# Patient Record
Sex: MALE | Race: WHITE | NOT HISPANIC OR LATINO | Employment: OTHER | URBAN - METROPOLITAN AREA
[De-identification: names, ages, dates, MRNs, and addresses within clinical notes are randomized per-mention and may not be internally consistent; named-entity substitution may affect disease eponyms.]

---

## 2017-02-19 ENCOUNTER — COMMUNICATION - HEALTHEAST (OUTPATIENT)
Dept: FAMILY MEDICINE | Facility: CLINIC | Age: 77
End: 2017-02-19

## 2017-02-19 DIAGNOSIS — I10 HYPERTENSION: ICD-10-CM

## 2017-03-30 ENCOUNTER — COMMUNICATION - HEALTHEAST (OUTPATIENT)
Dept: FAMILY MEDICINE | Facility: CLINIC | Age: 77
End: 2017-03-30

## 2017-03-30 ENCOUNTER — OFFICE VISIT - HEALTHEAST (OUTPATIENT)
Dept: FAMILY MEDICINE | Facility: CLINIC | Age: 77
End: 2017-03-30

## 2017-03-30 DIAGNOSIS — I48.0 PAROXYSMAL ATRIAL FIBRILLATION (H): ICD-10-CM

## 2017-03-30 DIAGNOSIS — I10 ESSENTIAL HYPERTENSION: ICD-10-CM

## 2017-03-30 DIAGNOSIS — E78.00 PURE HYPERCHOLESTEROLEMIA: ICD-10-CM

## 2017-03-30 DIAGNOSIS — E66.3 OVERWEIGHT (BMI 25.0-29.9): ICD-10-CM

## 2017-03-30 LAB
CHOLEST SERPL-MCNC: 237 MG/DL
FASTING STATUS PATIENT QL REPORTED: YES
HDLC SERPL-MCNC: 57 MG/DL
LDLC SERPL CALC-MCNC: 159 MG/DL
TRIGL SERPL-MCNC: 106 MG/DL

## 2018-01-18 ENCOUNTER — OFFICE VISIT - HEALTHEAST (OUTPATIENT)
Dept: FAMILY MEDICINE | Facility: CLINIC | Age: 78
End: 2018-01-18

## 2018-01-18 DIAGNOSIS — E66.3 OVERWEIGHT (BMI 25.0-29.9): ICD-10-CM

## 2018-01-18 DIAGNOSIS — D49.2 ATYPICAL SQUAMOPROLIFERATIVE SKIN LESION: ICD-10-CM

## 2018-01-18 DIAGNOSIS — L71.9 ACNE ROSACEA: ICD-10-CM

## 2018-01-18 DIAGNOSIS — I10 ESSENTIAL HYPERTENSION: ICD-10-CM

## 2018-01-18 DIAGNOSIS — I48.0 PAROXYSMAL ATRIAL FIBRILLATION (H): ICD-10-CM

## 2018-01-18 DIAGNOSIS — E78.00 PURE HYPERCHOLESTEROLEMIA: ICD-10-CM

## 2018-01-29 ENCOUNTER — RECORDS - HEALTHEAST (OUTPATIENT)
Dept: ADMINISTRATIVE | Facility: OTHER | Age: 78
End: 2018-01-29

## 2018-03-19 ENCOUNTER — COMMUNICATION - HEALTHEAST (OUTPATIENT)
Dept: FAMILY MEDICINE | Facility: CLINIC | Age: 78
End: 2018-03-19

## 2018-03-19 DIAGNOSIS — I10 ESSENTIAL HYPERTENSION: ICD-10-CM

## 2018-03-29 ENCOUNTER — OFFICE VISIT - HEALTHEAST (OUTPATIENT)
Dept: FAMILY MEDICINE | Facility: CLINIC | Age: 78
End: 2018-03-29

## 2018-03-29 DIAGNOSIS — L03.031 PARONYCHIA OF GREAT TOE, RIGHT: ICD-10-CM

## 2018-03-29 DIAGNOSIS — D49.2 ATYPICAL SQUAMOPROLIFERATIVE SKIN LESION: ICD-10-CM

## 2018-04-19 ENCOUNTER — OFFICE VISIT - HEALTHEAST (OUTPATIENT)
Dept: FAMILY MEDICINE | Facility: CLINIC | Age: 78
End: 2018-04-19

## 2018-04-19 DIAGNOSIS — I48.0 PAROXYSMAL ATRIAL FIBRILLATION (H): ICD-10-CM

## 2018-04-19 DIAGNOSIS — E66.3 OVERWEIGHT (BMI 25.0-29.9): ICD-10-CM

## 2018-04-19 DIAGNOSIS — I10 ESSENTIAL HYPERTENSION: ICD-10-CM

## 2018-04-19 DIAGNOSIS — E78.00 PURE HYPERCHOLESTEROLEMIA: ICD-10-CM

## 2018-04-19 DIAGNOSIS — Z00.01 ENCOUNTER FOR GENERAL ADULT MEDICAL EXAMINATION WITH ABNORMAL FINDINGS: ICD-10-CM

## 2018-04-19 LAB
ANION GAP SERPL CALCULATED.3IONS-SCNC: 10 MMOL/L (ref 5–18)
BUN SERPL-MCNC: 20 MG/DL (ref 8–28)
CALCIUM SERPL-MCNC: 9.6 MG/DL (ref 8.5–10.5)
CHLORIDE BLD-SCNC: 102 MMOL/L (ref 98–107)
CHOLEST SERPL-MCNC: 223 MG/DL
CO2 SERPL-SCNC: 29 MMOL/L (ref 22–31)
CREAT SERPL-MCNC: 1.12 MG/DL (ref 0.7–1.3)
FASTING STATUS PATIENT QL REPORTED: YES
GFR SERPL CREATININE-BSD FRML MDRD: >60 ML/MIN/1.73M2
GLUCOSE BLD-MCNC: 100 MG/DL (ref 70–125)
HDLC SERPL-MCNC: 56 MG/DL
LDLC SERPL CALC-MCNC: 146 MG/DL
POTASSIUM BLD-SCNC: 4.4 MMOL/L (ref 3.5–5)
SODIUM SERPL-SCNC: 141 MMOL/L (ref 136–145)
TRIGL SERPL-MCNC: 104 MG/DL

## 2018-04-19 ASSESSMENT — MIFFLIN-ST. JEOR: SCORE: 1772.93

## 2018-04-22 ENCOUNTER — COMMUNICATION - HEALTHEAST (OUTPATIENT)
Dept: FAMILY MEDICINE | Facility: CLINIC | Age: 78
End: 2018-04-22

## 2018-05-01 ENCOUNTER — OFFICE VISIT - HEALTHEAST (OUTPATIENT)
Dept: FAMILY MEDICINE | Facility: CLINIC | Age: 78
End: 2018-05-01

## 2018-05-01 DIAGNOSIS — R50.9 FEVER: ICD-10-CM

## 2018-05-01 DIAGNOSIS — R05.9 COUGH: ICD-10-CM

## 2018-05-01 DIAGNOSIS — R53.83 FATIGUE: ICD-10-CM

## 2018-05-01 DIAGNOSIS — J01.90 ACUTE SINUSITIS: ICD-10-CM

## 2018-05-01 LAB
ERYTHROCYTE [DISTWIDTH] IN BLOOD BY AUTOMATED COUNT: 11.5 % (ref 11–14.5)
FLUAV AG SPEC QL IA: NORMAL
FLUBV AG SPEC QL IA: NORMAL
HCT VFR BLD AUTO: 43.7 % (ref 40–54)
HGB BLD-MCNC: 14.9 G/DL (ref 14–18)
MCH RBC QN AUTO: 31.4 PG (ref 27–34)
MCHC RBC AUTO-ENTMCNC: 34.1 G/DL (ref 32–36)
MCV RBC AUTO: 92 FL (ref 80–100)
PLATELET # BLD AUTO: 214 THOU/UL (ref 140–440)
PMV BLD AUTO: 7.7 FL (ref 7–10)
RBC # BLD AUTO: 4.76 MILL/UL (ref 4.4–6.2)
WBC: 6.6 THOU/UL (ref 4–11)

## 2018-05-01 ASSESSMENT — MIFFLIN-ST. JEOR: SCORE: 1772.69

## 2018-10-25 ENCOUNTER — COMMUNICATION - HEALTHEAST (OUTPATIENT)
Dept: FAMILY MEDICINE | Facility: CLINIC | Age: 78
End: 2018-10-25

## 2018-10-25 ENCOUNTER — OFFICE VISIT - HEALTHEAST (OUTPATIENT)
Dept: FAMILY MEDICINE | Facility: CLINIC | Age: 78
End: 2018-10-25

## 2018-10-25 DIAGNOSIS — L71.9 ACNE ROSACEA: ICD-10-CM

## 2018-10-25 DIAGNOSIS — E78.00 PURE HYPERCHOLESTEROLEMIA: ICD-10-CM

## 2018-10-25 DIAGNOSIS — I48.0 PAROXYSMAL ATRIAL FIBRILLATION (H): ICD-10-CM

## 2018-10-25 DIAGNOSIS — I10 ESSENTIAL HYPERTENSION: ICD-10-CM

## 2018-10-25 DIAGNOSIS — E66.3 OVERWEIGHT (BMI 25.0-29.9): ICD-10-CM

## 2018-10-25 LAB
ANION GAP SERPL CALCULATED.3IONS-SCNC: 10 MMOL/L (ref 5–18)
BUN SERPL-MCNC: 19 MG/DL (ref 8–28)
CALCIUM SERPL-MCNC: 9.6 MG/DL (ref 8.5–10.5)
CHLORIDE BLD-SCNC: 105 MMOL/L (ref 98–107)
CO2 SERPL-SCNC: 28 MMOL/L (ref 22–31)
CREAT SERPL-MCNC: 1.18 MG/DL (ref 0.7–1.3)
GFR SERPL CREATININE-BSD FRML MDRD: 60 ML/MIN/1.73M2
GLUCOSE BLD-MCNC: 93 MG/DL (ref 70–125)
POTASSIUM BLD-SCNC: 4 MMOL/L (ref 3.5–5)
SODIUM SERPL-SCNC: 143 MMOL/L (ref 136–145)

## 2018-10-29 ENCOUNTER — COMMUNICATION - HEALTHEAST (OUTPATIENT)
Dept: FAMILY MEDICINE | Facility: CLINIC | Age: 78
End: 2018-10-29

## 2018-10-29 DIAGNOSIS — L71.9 ACNE ROSACEA: ICD-10-CM

## 2018-10-31 ENCOUNTER — OFFICE VISIT - HEALTHEAST (OUTPATIENT)
Dept: FAMILY MEDICINE | Facility: CLINIC | Age: 78
End: 2018-10-31

## 2018-10-31 DIAGNOSIS — R04.0 EPISTAXIS: ICD-10-CM

## 2018-10-31 DIAGNOSIS — L71.9 ACNE ROSACEA: ICD-10-CM

## 2018-10-31 DIAGNOSIS — E78.00 PURE HYPERCHOLESTEROLEMIA: ICD-10-CM

## 2018-10-31 DIAGNOSIS — I10 ESSENTIAL HYPERTENSION: ICD-10-CM

## 2018-12-10 ENCOUNTER — COMMUNICATION - HEALTHEAST (OUTPATIENT)
Dept: FAMILY MEDICINE | Facility: CLINIC | Age: 78
End: 2018-12-10

## 2018-12-10 DIAGNOSIS — I10 ESSENTIAL HYPERTENSION: ICD-10-CM

## 2019-01-07 ENCOUNTER — OFFICE VISIT - HEALTHEAST (OUTPATIENT)
Dept: FAMILY MEDICINE | Facility: CLINIC | Age: 79
End: 2019-01-07

## 2019-01-07 DIAGNOSIS — R04.0 EPISTAXIS: ICD-10-CM

## 2019-01-24 ENCOUNTER — OFFICE VISIT - HEALTHEAST (OUTPATIENT)
Dept: OTOLARYNGOLOGY | Facility: CLINIC | Age: 79
End: 2019-01-24

## 2019-01-24 DIAGNOSIS — R04.0 EPISTAXIS: ICD-10-CM

## 2019-02-06 ENCOUNTER — OFFICE VISIT - HEALTHEAST (OUTPATIENT)
Dept: OTOLARYNGOLOGY | Facility: CLINIC | Age: 79
End: 2019-02-06

## 2019-02-06 DIAGNOSIS — R04.0 EPISTAXIS: ICD-10-CM

## 2019-05-30 ENCOUNTER — OFFICE VISIT - HEALTHEAST (OUTPATIENT)
Dept: FAMILY MEDICINE | Facility: CLINIC | Age: 79
End: 2019-05-30

## 2019-05-30 DIAGNOSIS — T16.2XXA FOREIGN BODY OF LEFT EAR, INITIAL ENCOUNTER: ICD-10-CM

## 2019-10-29 ENCOUNTER — COMMUNICATION - HEALTHEAST (OUTPATIENT)
Dept: FAMILY MEDICINE | Facility: CLINIC | Age: 79
End: 2019-10-29

## 2019-12-10 ENCOUNTER — COMMUNICATION - HEALTHEAST (OUTPATIENT)
Dept: FAMILY MEDICINE | Facility: CLINIC | Age: 79
End: 2019-12-10

## 2019-12-10 ENCOUNTER — AMBULATORY - HEALTHEAST (OUTPATIENT)
Dept: FAMILY MEDICINE | Facility: CLINIC | Age: 79
End: 2019-12-10

## 2019-12-10 ENCOUNTER — OFFICE VISIT - HEALTHEAST (OUTPATIENT)
Dept: FAMILY MEDICINE | Facility: CLINIC | Age: 79
End: 2019-12-10

## 2019-12-10 DIAGNOSIS — N45.1 EPIDIDYMITIS: ICD-10-CM

## 2019-12-10 DIAGNOSIS — I10 ESSENTIAL HYPERTENSION: ICD-10-CM

## 2019-12-10 LAB
ALBUMIN UR-MCNC: NEGATIVE MG/DL
APPEARANCE UR: CLEAR
BILIRUB UR QL STRIP: NEGATIVE
COLOR UR AUTO: YELLOW
GLUCOSE UR STRIP-MCNC: NEGATIVE MG/DL
HGB UR QL STRIP: NEGATIVE
KETONES UR STRIP-MCNC: NEGATIVE MG/DL
LEUKOCYTE ESTERASE UR QL STRIP: NEGATIVE
NITRATE UR QL: NEGATIVE
PH UR STRIP: 5.5 [PH] (ref 5–8)
SP GR UR STRIP: 1.02 (ref 1–1.03)
UROBILINOGEN UR STRIP-ACNC: NORMAL

## 2019-12-10 ASSESSMENT — MIFFLIN-ST. JEOR: SCORE: 1772.24

## 2019-12-24 ENCOUNTER — OFFICE VISIT - HEALTHEAST (OUTPATIENT)
Dept: FAMILY MEDICINE | Facility: CLINIC | Age: 79
End: 2019-12-24

## 2019-12-24 DIAGNOSIS — R30.0 DYSURIA: ICD-10-CM

## 2019-12-24 DIAGNOSIS — N43.3 HYDROCELE IN ADULT: ICD-10-CM

## 2019-12-24 DIAGNOSIS — K40.90 HERNIA OF SCROTUM: ICD-10-CM

## 2019-12-24 DIAGNOSIS — K40.91 UNILATERAL RECURRENT INGUINAL HERNIA WITHOUT OBSTRUCTION OR GANGRENE: ICD-10-CM

## 2019-12-26 ENCOUNTER — OFFICE VISIT - HEALTHEAST (OUTPATIENT)
Dept: FAMILY MEDICINE | Facility: CLINIC | Age: 79
End: 2019-12-26

## 2019-12-26 DIAGNOSIS — I48.0 PAROXYSMAL ATRIAL FIBRILLATION (H): ICD-10-CM

## 2019-12-26 DIAGNOSIS — I10 ESSENTIAL HYPERTENSION: ICD-10-CM

## 2019-12-26 DIAGNOSIS — N43.3 BILATERAL HYDROCELE: ICD-10-CM

## 2019-12-26 DIAGNOSIS — N45.1 EPIDIDYMITIS: ICD-10-CM

## 2019-12-26 DIAGNOSIS — R30.0 DYSURIA: ICD-10-CM

## 2019-12-26 LAB
ANION GAP SERPL CALCULATED.3IONS-SCNC: 11 MMOL/L (ref 5–18)
BUN SERPL-MCNC: 22 MG/DL (ref 8–28)
CALCIUM SERPL-MCNC: 9.8 MG/DL (ref 8.5–10.5)
CHLORIDE BLD-SCNC: 103 MMOL/L (ref 98–107)
CO2 SERPL-SCNC: 27 MMOL/L (ref 22–31)
CREAT SERPL-MCNC: 1.15 MG/DL (ref 0.7–1.3)
ERYTHROCYTE [DISTWIDTH] IN BLOOD BY AUTOMATED COUNT: 11.8 % (ref 11–14.5)
GFR SERPL CREATININE-BSD FRML MDRD: >60 ML/MIN/1.73M2
GLUCOSE BLD-MCNC: 96 MG/DL (ref 70–125)
HCT VFR BLD AUTO: 43.8 % (ref 40–54)
HGB BLD-MCNC: 15.4 G/DL (ref 14–18)
MCH RBC QN AUTO: 32.2 PG (ref 27–34)
MCHC RBC AUTO-ENTMCNC: 35.2 G/DL (ref 32–36)
MCV RBC AUTO: 91 FL (ref 80–100)
PLATELET # BLD AUTO: 258 THOU/UL (ref 140–440)
PMV BLD AUTO: 7.3 FL (ref 7–10)
POTASSIUM BLD-SCNC: 4.2 MMOL/L (ref 3.5–5)
RBC # BLD AUTO: 4.79 MILL/UL (ref 4.4–6.2)
SODIUM SERPL-SCNC: 141 MMOL/L (ref 136–145)
WBC: 5.6 THOU/UL (ref 4–11)

## 2019-12-27 ENCOUNTER — COMMUNICATION - HEALTHEAST (OUTPATIENT)
Dept: FAMILY MEDICINE | Facility: CLINIC | Age: 79
End: 2019-12-27

## 2020-01-13 ENCOUNTER — RECORDS - HEALTHEAST (OUTPATIENT)
Dept: ADMINISTRATIVE | Facility: OTHER | Age: 80
End: 2020-01-13

## 2020-03-02 ENCOUNTER — COMMUNICATION - HEALTHEAST (OUTPATIENT)
Dept: FAMILY MEDICINE | Facility: CLINIC | Age: 80
End: 2020-03-02

## 2020-03-02 DIAGNOSIS — I10 ESSENTIAL HYPERTENSION: ICD-10-CM

## 2020-04-02 ENCOUNTER — RECORDS - HEALTHEAST (OUTPATIENT)
Dept: ADMINISTRATIVE | Facility: OTHER | Age: 80
End: 2020-04-02

## 2020-04-13 ENCOUNTER — RECORDS - HEALTHEAST (OUTPATIENT)
Dept: ADMINISTRATIVE | Facility: OTHER | Age: 80
End: 2020-04-13

## 2020-04-15 ENCOUNTER — OFFICE VISIT - HEALTHEAST (OUTPATIENT)
Dept: FAMILY MEDICINE | Facility: CLINIC | Age: 80
End: 2020-04-15

## 2020-04-15 DIAGNOSIS — J34.89 RHINORRHEA: ICD-10-CM

## 2020-04-15 DIAGNOSIS — R21 RASH: ICD-10-CM

## 2020-04-23 ENCOUNTER — OFFICE VISIT - HEALTHEAST (OUTPATIENT)
Dept: FAMILY MEDICINE | Facility: CLINIC | Age: 80
End: 2020-04-23

## 2020-04-23 DIAGNOSIS — J30.2 SEASONAL ALLERGIC RHINITIS, UNSPECIFIED TRIGGER: ICD-10-CM

## 2020-04-23 DIAGNOSIS — L27.0 ALLERGIC DRUG RASH: ICD-10-CM

## 2020-04-23 RX ORDER — CETIRIZINE HYDROCHLORIDE 10 MG/1
10 TABLET ORAL DAILY
Qty: 30 TABLET | Refills: 2 | Status: SHIPPED | OUTPATIENT
Start: 2020-04-23

## 2020-04-29 ENCOUNTER — OFFICE VISIT - HEALTHEAST (OUTPATIENT)
Dept: FAMILY MEDICINE | Facility: CLINIC | Age: 80
End: 2020-04-29

## 2020-04-29 DIAGNOSIS — L30.9 DERMATITIS: ICD-10-CM

## 2020-05-19 ENCOUNTER — RECORDS - HEALTHEAST (OUTPATIENT)
Dept: ADMINISTRATIVE | Facility: OTHER | Age: 80
End: 2020-05-19

## 2020-10-30 ENCOUNTER — MEDICAL CORRESPONDENCE (OUTPATIENT)
Dept: HEALTH INFORMATION MANAGEMENT | Facility: CLINIC | Age: 80
End: 2020-10-30

## 2020-12-08 ENCOUNTER — COMMUNICATION - HEALTHEAST (OUTPATIENT)
Dept: FAMILY MEDICINE | Facility: CLINIC | Age: 80
End: 2020-12-08

## 2020-12-08 ENCOUNTER — OFFICE VISIT - HEALTHEAST (OUTPATIENT)
Dept: FAMILY MEDICINE | Facility: CLINIC | Age: 80
End: 2020-12-08

## 2020-12-08 DIAGNOSIS — R97.20 ELEVATED PROSTATE SPECIFIC ANTIGEN (PSA): ICD-10-CM

## 2020-12-08 DIAGNOSIS — E78.00 PURE HYPERCHOLESTEROLEMIA: ICD-10-CM

## 2020-12-08 DIAGNOSIS — I48.0 PAROXYSMAL ATRIAL FIBRILLATION (H): ICD-10-CM

## 2020-12-08 DIAGNOSIS — R21 RASH: ICD-10-CM

## 2020-12-08 DIAGNOSIS — I10 ESSENTIAL HYPERTENSION: ICD-10-CM

## 2020-12-08 LAB
ANION GAP SERPL CALCULATED.3IONS-SCNC: 9 MMOL/L (ref 5–18)
BUN SERPL-MCNC: 25 MG/DL (ref 8–28)
CALCIUM SERPL-MCNC: 9.3 MG/DL (ref 8.5–10.5)
CHLORIDE BLD-SCNC: 104 MMOL/L (ref 98–107)
CHOLEST SERPL-MCNC: 228 MG/DL
CO2 SERPL-SCNC: 28 MMOL/L (ref 22–31)
CREAT SERPL-MCNC: 1.17 MG/DL (ref 0.7–1.3)
FASTING STATUS PATIENT QL REPORTED: YES
GFR SERPL CREATININE-BSD FRML MDRD: 60 ML/MIN/1.73M2
GLUCOSE BLD-MCNC: 106 MG/DL (ref 70–125)
HDLC SERPL-MCNC: 53 MG/DL
LDLC SERPL CALC-MCNC: 152 MG/DL
POTASSIUM BLD-SCNC: 4.4 MMOL/L (ref 3.5–5)
SODIUM SERPL-SCNC: 141 MMOL/L (ref 136–145)
TRIGL SERPL-MCNC: 114 MG/DL

## 2020-12-08 RX ORDER — BETAMETHASONE DIPROPIONATE 0.5 MG/G
OINTMENT, AUGMENTED TOPICAL
Qty: 45 G | Refills: 0 | Status: SHIPPED | OUTPATIENT
Start: 2020-12-08 | End: 2023-01-17

## 2020-12-08 RX ORDER — LISINOPRIL AND HYDROCHLOROTHIAZIDE 20; 25 MG/1; MG/1
1 TABLET ORAL DAILY
Qty: 90 TABLET | Refills: 3 | Status: SHIPPED | OUTPATIENT
Start: 2020-12-08 | End: 2021-12-08

## 2020-12-08 RX ORDER — METOPROLOL SUCCINATE 50 MG/1
50 TABLET, EXTENDED RELEASE ORAL DAILY
Qty: 90 TABLET | Refills: 3 | Status: SHIPPED | OUTPATIENT
Start: 2020-12-08 | End: 2021-12-08

## 2021-05-25 ENCOUNTER — AMBULATORY - HEALTHEAST (OUTPATIENT)
Dept: NURSING | Facility: CLINIC | Age: 81
End: 2021-05-25

## 2021-05-29 NOTE — PROGRESS NOTES
Chief Complaint   Patient presents with     other     part of hearing aid stuck in left ear       HPI    Patient is here for having a part of his hearing aid stuck in left ear today. No pain, bleeding.    ROS: Pertinent ROS noted in HPI.     No Known Allergies    Patient Active Problem List   Diagnosis     Hypercholesterolemia     Paroxysmal Atrial Fibrillation     Rosacea     Trigger Finger (Acquired)     Hypertension     Lichen Planus       No family history on file.    Social History     Socioeconomic History     Marital status:      Spouse name: Not on file     Number of children: Not on file     Years of education: Not on file     Highest education level: Not on file   Occupational History     Not on file   Social Needs     Financial resource strain: Not on file     Food insecurity:     Worry: Not on file     Inability: Not on file     Transportation needs:     Medical: Not on file     Non-medical: Not on file   Tobacco Use     Smoking status: Former Smoker     Smokeless tobacco: Never Used   Substance and Sexual Activity     Alcohol use: No     Drug use: No     Sexual activity: Not on file   Lifestyle     Physical activity:     Days per week: Not on file     Minutes per session: Not on file     Stress: Not on file   Relationships     Social connections:     Talks on phone: Not on file     Gets together: Not on file     Attends [employer] service: Not on file     Active member of club or organization: Not on file     Attends meetings of clubs or organizations: Not on file     Relationship status: Not on file     Intimate partner violence:     Fear of current or ex partner: Not on file     Emotionally abused: Not on file     Physically abused: Not on file     Forced sexual activity: Not on file   Other Topics Concern     Not on file   Social History Narrative     Not on file         Objective:      Vitals:    05/30/19 1243 05/30/19 1247   BP: 154/79 144/80   Patient Site: Right Arm Right Arm   Patient  Position: Sitting Sitting   Cuff Size: Adult Large Adult Large   Pulse: 75    Resp: 16    Temp: 98.3  F (36.8  C)    TempSrc: Oral    SpO2: 97%    Weight: 212 lb (96.2 kg)        Gen:NAD  Ears: R TM and canal normal. Exam showed a rubber foreign body stuck in left ear canal that was removed successfully with an alligator instrument. Post-procedure exam showed normal L TM and canal.    Assessment:    Foreign body in left ear - removed without event.    Plan:    F/u prn.

## 2021-05-30 VITALS — WEIGHT: 222 LBS | BODY MASS INDEX: 28.89 KG/M2

## 2021-05-31 VITALS — BODY MASS INDEX: 29.54 KG/M2 | WEIGHT: 227 LBS

## 2021-06-01 VITALS — WEIGHT: 222 LBS | BODY MASS INDEX: 29.42 KG/M2 | HEIGHT: 73 IN

## 2021-06-01 VITALS — BODY MASS INDEX: 27.81 KG/M2 | HEIGHT: 74 IN | WEIGHT: 216.7 LBS

## 2021-06-01 VITALS — BODY MASS INDEX: 29.54 KG/M2 | WEIGHT: 227 LBS

## 2021-06-02 VITALS — BODY MASS INDEX: 28.45 KG/M2 | WEIGHT: 221.6 LBS

## 2021-06-02 VITALS — WEIGHT: 220 LBS | BODY MASS INDEX: 28.25 KG/M2

## 2021-06-02 VITALS — WEIGHT: 223 LBS | BODY MASS INDEX: 28.63 KG/M2

## 2021-06-02 NOTE — TELEPHONE ENCOUNTER
"Who is calling:  SARA Vasquez from [facility], she is an NP and did a peripheal artery disease screening on this patient. The patient has \"moderate results\" he is not on a statin that she knows of. He's not having any symptoms either. \"I just want you to be aware.\" Blood pressure was 150/84 the patient said he takes it at home and is always between 1/30 and 140, and Christina said she had checked it twice and got same reading of 150/84.  Reason for Call:  See above   Date of last appointment with primary care: N/A  Okay to leave a detailed message: Yes       "

## 2021-06-03 VITALS — BODY MASS INDEX: 27.22 KG/M2 | WEIGHT: 212 LBS

## 2021-06-04 VITALS
BODY MASS INDEX: 27.48 KG/M2 | SYSTOLIC BLOOD PRESSURE: 130 MMHG | WEIGHT: 214 LBS | DIASTOLIC BLOOD PRESSURE: 80 MMHG | OXYGEN SATURATION: 98 % | HEART RATE: 78 BPM

## 2021-06-04 VITALS
RESPIRATION RATE: 16 BRPM | BODY MASS INDEX: 27.1 KG/M2 | SYSTOLIC BLOOD PRESSURE: 169 MMHG | DIASTOLIC BLOOD PRESSURE: 82 MMHG | TEMPERATURE: 98.2 F | OXYGEN SATURATION: 97 % | HEART RATE: 70 BPM | WEIGHT: 211.1 LBS

## 2021-06-04 VITALS
DIASTOLIC BLOOD PRESSURE: 80 MMHG | OXYGEN SATURATION: 96 % | RESPIRATION RATE: 16 BRPM | TEMPERATURE: 98.2 F | HEART RATE: 88 BPM | SYSTOLIC BLOOD PRESSURE: 148 MMHG

## 2021-06-04 VITALS
OXYGEN SATURATION: 98 % | BODY MASS INDEX: 27.8 KG/M2 | WEIGHT: 216.6 LBS | HEART RATE: 81 BPM | SYSTOLIC BLOOD PRESSURE: 140 MMHG | HEIGHT: 74 IN | DIASTOLIC BLOOD PRESSURE: 80 MMHG

## 2021-06-04 VITALS
DIASTOLIC BLOOD PRESSURE: 75 MMHG | HEART RATE: 80 BPM | BODY MASS INDEX: 27.45 KG/M2 | SYSTOLIC BLOOD PRESSURE: 157 MMHG | RESPIRATION RATE: 12 BRPM | OXYGEN SATURATION: 96 % | WEIGHT: 213.8 LBS | TEMPERATURE: 98.3 F

## 2021-06-04 NOTE — PROGRESS NOTES
"Assessment/Plan:    1. Epididymitis  Pain most suspicious of epididymitis based on history and exam today.  Will treat empirically with levofloxacin 500 mg once daily for 10 days.  We discussed obtaining a testicular ultrasound as well however patient prefers to start with antibiotics initially.  Will consider ultrasound with any persisting or worsening symptoms.  Will check urinalysis today to rule out UTI as well.  - Urinalysis-UC if Indicated  - levoFLOXacin (LEVAQUIN) 500 MG tablet; Take 1 tablet (500 mg total) by mouth daily for 10 days.  Dispense: 10 tablet; Refill: 0      Subjective:    Reddy Allen is a 79-year-old male seen today for edition of right testicular pain.  Patient developed symptoms about 3 weeks ago.  Initially, describes a sensation of twinge after voiding.  Did not have pain with voiding but right after.  Pain is localized to the right testicle.  Over the last couple of days pain has gotten worse.  He now has discomfort with sitting.  Less pain with walking or when in an upright position.  He denies any injury to the testicle that he is aware of.  Low chance for STD.  Has not felt any lumps or bumps.  He describes discomfort as a dull \"toothache\" in his testicle.  He has had some minor loose stools off and on but this is not new for him.  Otherwise normal bowel movements.  Denies any fevers, chills, myalgias or other systemic symptoms.  No urinary symptoms such as hematuria, dysuria, frequency or urgency. No history of symptoms similar to this.  Does recall being told that he has hernia several years ago.  Cannot recall the location however.  Review of systems is as stated in HPI, and the remainder of the 10 system review is otherwise unremarkable.    Past Medical History, Family History, and Social History reviewed.    History reviewed. No pertinent surgical history.     No family history on file.     History reviewed. No pertinent past medical history.     Social History     Tobacco Use " "    Smoking status: Former Smoker     Smokeless tobacco: Never Used   Substance Use Topics     Alcohol use: No     Drug use: No        Current Outpatient Medications   Medication Sig Dispense Refill     lisinopril-hydrochlorothiazide (PRINZIDE,ZESTORETIC) 20-25 mg per tablet Take 1 tablet by mouth daily. 90 tablet 3     metoprolol succinate (TOPROL-XL) 50 MG 24 hr tablet Take 1 tablet (50 mg total) by mouth daily. 90 tablet 3     MULTIVITS-MIN/HRB  (URINOZINC PROSTATE FORMULA ORAL) Take by mouth.       aspirin 81 MG EC tablet Take 1 tablet (81 mg total) by mouth daily. 150 tablet 2     levoFLOXacin (LEVAQUIN) 500 MG tablet Take 1 tablet (500 mg total) by mouth daily for 10 days. 10 tablet 0     No current facility-administered medications for this visit.           Objective:    Vitals:    12/10/19 1019   BP: 144/80   Patient Site: Right Arm   Patient Position: Sitting   Cuff Size: Adult Regular   Pulse: 81   SpO2: 98%   Weight: 216 lb 9.6 oz (98.2 kg)   Height: 6' 2\" (1.88 m)      Body mass index is 27.81 kg/m .      General Appearance:    Alert, cooperative, no distress, appears stated age.     Heart:    Regular rate and rhythm, S1 and S2 normal, no murmur, rub   or gallop   Abdomen:    Back:     Soft, non-tender, bowel sounds active all four quadrants,     no masses, no organomegaly.    No CVA tenderness   Genitalia:    Normal male without lesion, discharge. No tenderness on palpation.  No obvious inguinal hernia noted.     Skin:   Skin color, texture, turgor normal, no rashes or lesions         This note has been dictated using voice recognition software. Any grammatical or context distortions are unintentional and inherent to the use of this software.     "

## 2021-06-04 NOTE — TELEPHONE ENCOUNTER
Pt. Came in stated that his was afraid to take his antibiotic that he filled because the side effects worry him.  I asked if he wanted a different one filled and he was still hesitant to request that.  He asked if Melissa could call him quick to discuss what he should do.      Thanks.

## 2021-06-04 NOTE — PATIENT INSTRUCTIONS - HE
[facility] Office  [address] [place] [id number]      at 1:30 pm

## 2021-06-04 NOTE — TELEPHONE ENCOUNTER
----- Message from Melany Lange CMA sent at 12/10/2019 11:14 AM CST -----    ----- Message -----  From: Melissa Valente CNP  Sent: 12/10/2019  11:13 AM CST  To: Melissa Valente Care Team Pool    Your urine came back normal which is reassuring. I recommend taking antibiotics as prescribed and monitoring for any worsening symptoms.  If you continue to have discomfort, please let us know and I will place referral for scrotal ultrasound.

## 2021-06-04 NOTE — PROGRESS NOTES
"Assessment/Plan:    1. Dysuria  Dysuria noted.  Ongoing x5 weeks.  Urinalysis negative.  Recent completion of antibiotic Bactrim without change.  Referred to urology per patient request.  CBC obtained as well as base metabolic panel.  - Ambulatory referral to Urology  - [facility](CBC w/o Differential)    2. Bilateral hydrocele  Bilateral hydrocele noted.  Doubtful correlation with current dysuria, postvoid.  - Ambulatory referral to Urology    3. Epididymitis  Described history of epididymitis possibly however did complete antibiotic without benefit.  Patient to be evaluated through [place] [facility] for further recommendation.    4. Essential hypertension  Hypertension stable.  Continues lisinopril hydrochlorothiazide 20/25 daily plus metoprolol succinate 50 mg daily.  - Basic Metabolic Panel    5. Paroxysmal atrial fibrillation (H)  No evidence for recurrent atrial fibrillation.          Subjective:    Reddy Allen is seen today for follow-up evaluation.  Describes pain in right groin over past 5 weeks.  Not bad when he is up and active.  Notices it more when he is lying down.  Post void discomfort more like a \"ache\".  Was seen through this office December 10, 2019 with normal urinalysis.  Consideration for epididymitis.  Was initially prescribed levofloxacin however was worried about medication based on side effect potential therefore switched to Bactrim which he completed.  No improvement.  Seen through walk-in care in [place] December 24, 2019.  Ultrasound without evidence for significant pathology however does have bilateral hydroceles present.  Urinalysis was negative.  History of hypertension noted and continues lisinopril hydrochlorothiazide as well as metoprolol succinate.    Remarried \"[place]\" x 25 years   1 son -   1 daughter -   5 grandchildren  1 great-grandchild   Work: Retired x 9 years from [employer] ()   Surgeries: 1/17/12 ayla schrader at [facility] (Dr. Wolf) after admit with " pancreatitis, hepatitis, etc. (postop a. fib described without reoccurrence)  Hospitalizations: none   No smoke (quit 25 years ago after 2 ppd x 25 years)   No EtOH (no h/o problems)   Mom -  88 (CVA)   Dad -  77 (heart valve)   8 siblings - (3 ) - accidental broken neck after diving into a rock, lung CA (asbestos exposure), and cerebral hemorrhage   Pt is not interested in any vaccines-he had a reaction to the flu vaccine once and is not interested in any shots at all.-Griselda Segura, CMA 09   Going to [place], [place]  - Mar 2011   PATIENT NEEDS PNEUMOVAX VACCINATION    History reviewed. No pertinent surgical history.     No family history on file.     History reviewed. No pertinent past medical history.     Social History     Tobacco Use     Smoking status: Former Smoker     Smokeless tobacco: Never Used   Substance Use Topics     Alcohol use: No     Drug use: No        Current Outpatient Medications   Medication Sig Dispense Refill     lisinopril-hydrochlorothiazide (PRINZIDE,ZESTORETIC) 20-25 mg per tablet TAKE 1 TABLET BY MOUTH ONCE DAILY 90 tablet 0     metoprolol succinate (TOPROL-XL) 50 MG 24 hr tablet Take 1 tablet (50 mg total) by mouth daily. 90 tablet 0     MULTIVITS-MIN/HRB  (URINOZINC PROSTATE FORMULA ORAL) Take by mouth.       UNABLE TO FIND Med Name:temeric - twice a day - to help lower BS       No current facility-administered medications for this visit.           Objective:    Vitals:    19 1040   BP: 130/80   Pulse: 78   SpO2: 98%   Weight: 214 lb (97.1 kg)      Body mass index is 27.48 kg/m .    Alert.  No apparent distress.  Chest clear.  Cardiac exam regular.  Recent genitalia exam normal without significant asymmetry, inguinal hernia etc.      This note has been dictated using voice recognition software and as a result may contain minor grammatical errors and unintended word substitutions.

## 2021-06-04 NOTE — PROGRESS NOTES
Chief Complaint   Patient presents with     Testicle Pain     achy to sit and urinate       HPI:  Reddy Allen is a 79 y.o. male who presents today with pain in his right testicle.  Patient states urinating and sitting can make it worse.  Changing positions otherwise is not affected.  He does have a known right inguinal hernia by his primary care provider.  Denies any testicular or scrotal swelling, discharge or lesions.  The symptoms have been going on for about a month.  He was given antibiotics 2 weeks ago by his primary care but had no improvement of symptoms.    Problem List:  Hypercholesterolemia  Paroxysmal Atrial Fibrillation  Rosacea  Trigger Finger (Acquired)  Tonsillitis  Hypertension  Acute Sinusitis  Lichen Planus      No past medical history on file.    Current Outpatient Medications on File Prior to Visit   Medication Sig Dispense Refill     lisinopril-hydrochlorothiazide (PRINZIDE,ZESTORETIC) 20-25 mg per tablet TAKE 1 TABLET BY MOUTH ONCE DAILY 90 tablet 0     metoprolol succinate (TOPROL-XL) 50 MG 24 hr tablet Take 1 tablet (50 mg total) by mouth daily. 90 tablet 0     MULTIVITS-MIN/HRB  (URINOZINC PROSTATE FORMULA ORAL) Take by mouth.       No current facility-administered medications on file prior to visit.         Social History     Tobacco Use     Smoking status: Former Smoker     Smokeless tobacco: Never Used   Substance Use Topics     Alcohol use: No       ROS:  As stated in HPI    Vitals:    12/24/19 1108 12/24/19 1111   BP: 166/81 169/82   Patient Site: Right Arm Right Arm   Patient Position: Sitting Sitting   Cuff Size: Adult Large Adult Large   Pulse: 70    Resp: 16    Temp: 98.2  F (36.8  C)    TempSrc: Oral    SpO2: 97%    Weight: 211 lb 1.6 oz (95.8 kg)        Physical Exam:  General: Alert, No apparent distress, Cooperative  SKIN: dry, warm, no rash or lesions seen in areas of exposed skin  HENT: HEAD: ATNC,   EYES: Conjunctiva clear, EOMI  : no visible deformities, lesions  or swelling seen of external genitalia. R Testicle non tender, epidiymis nontender, prominent palpable spermatic cord but nontender, inguinal Hernia R.  NUERO: AOx3, normal mentation  PSYCH: normal mood and affect    Results for orders placed or performed in visit on 12/24/19   Urinalysis-UC if Indicated   Result Value Ref Range    Color, UA Yellow Colorless, Yellow, Straw, Light Yellow    Clarity, UA Clear Clear    Glucose, UA Negative Negative    Bilirubin, UA Negative Negative    Ketones, UA Negative Negative    Specific Gravity, UA 1.025 1.005 - 1.030    Blood, UA Negative Negative    pH, UA 5.5 5.0 - 8.0    Protein, UA Negative Negative mg/dL    Urobilinogen, UA 0.2 E.U./dL 0.2 E.U./dL, 1.0 E.U./dL    Nitrite, UA Negative Negative    Leukocytes, UA Negative Negative     EXAM DATE:         12/24/2019     EXAM: US ECHO SCROTUM, US SCROTUM DUPLEX DOPPLER  LOCATION[facility]  DATE/TIME: 12/24/2019 1:45 PM     INDICATION: EVALUATE FOR HERNIA. Scrotal pain.  COMPARISON: None.  TECHNIQUE: Ultrasound of scrotum with color flow and spectral Doppler with  waveform analysis performed.     FINDINGS:     RIGHT: Right testicle measures 3.8 x 2.7 x 2.7 cm. The testicle is homogeneous  with a few simple cysts measuring up to 0.2 cm. No suspicious testicular mass.  Normal vascular flow in the testicle. Normal epididymis. A partially calcified  0.6 cm scrotal shobha. A moderate hydrocele. A well-defined 1.1 x 0.9 x 0.7 cm  cyst along the inferior margin of the right hydrocele. No varicocele.     LEFT: Left testicle measures 3.1 x 2.4 x 2.5 cm. The testicle is normal. Dilated  rete testes with simple cysts in the testicle measuring up to 0.5 cm. No cystic  suspicious testicular mass. Normal vascular flow in the testicle. A simple 1.0  cm cyst in the epididymal head. A small hydrocele. A varicocele.     Other: No hernia in the scrotum with or without Valsalva.     IMPRESSION:  1.  No hernia within the  scrotum.  2.  Bilateral hydroceles, right greater than left.  3.  Dilated rete testes on the left.  4.  A 0.6 cm scrotal shobha on the right.  5.  Left varicocele.        Assessment and Plan:  1. Dysuria  Urinalysis-UC if Indicated   2. Hernia of scrotum  US Hernia Evaluation      Patient and I discussed his results above and he will follow-up with both a urologist and then a general surgeon.  Unclear whether or not the hydrocele over the hernia is causing his symptoms.  My suspicion is that is the hydrocele and so he should see the urologist first.  He may need one or both of these issues surgically treated.  We discussed signs and symptoms that would warrant immediate medical evaluation.  Patient can consider ibuprofen or Tylenol for the symptoms and pain.    Tej Guajardo PA-C

## 2021-06-04 NOTE — TELEPHONE ENCOUNTER
Refill Given    Refill given per Policy, patient informed they are overdue for Labs   OV 10/31/18    Yasmin Medina, [facility] Triage/Med Refill 12/11/2019    Requested Prescriptions   Pending Prescriptions Disp Refills     lisinopril-hydrochlorothiazide (PRINZIDE,ZESTORETIC) 20-25 mg per tablet [Pharmacy Med Name: LISINOPRIL/HCTZ 20-25MG TAB] 90 tablet 3     Sig: TAKE 1 TABLET BY MOUTH ONCE DAILY       Diuretics/Combination Diuretics Refill Protocol  Failed - 12/10/2019 12:24 PM        Failed - Serum Potassium in past 12 months      No results found for: LN-POTASSIUM          Failed - Serum Sodium in past 12 months      No results found for: LN-SODIUM          Failed - Serum Creatinine in past 12 months      Creatinine   Date Value Ref Range Status   10/25/2018 1.18 0.70 - 1.30 mg/dL Final             Passed - Visit with PCP or prescribing provider visit in past 12 months     Last office visit with prescriber/PCP: 10/31/2018 Héctor Nicholson MD OR same dept: 12/10/2019 Melissa Valente CNP OR same specialty: 12/10/2019 Melissa Valente CNP  Last physical: 4/19/2018 Last MTM visit: Visit date not found   Next visit within 3 mo: Visit date not found  Next physical within 3 mo: Visit date not found  Prescriber OR PCP: Héctor Nicholson MD  Last diagnosis associated with med order: 1. Essential hypertension  - lisinopril-hydrochlorothiazide (PRINZIDE,ZESTORETIC) 20-25 mg per tablet [Pharmacy Med Name: LISINOPRIL/HCTZ 20-25MG TAB]; TAKE 1 TABLET BY MOUTH ONCE DAILY  Dispense: 90 tablet; Refill: 3  - metoprolol succinate (TOPROL-XL) 50 MG 24 hr tablet [Pharmacy Med Name: METOPROLOL ER 50MG  TAB]; TAKE 1 TABLET BY MOUTH ONCE DAILY  Dispense: 90 tablet; Refill: 3    If protocol passes may refill for 12 months if within 3 months of last provider visit (or a total of 15 months).             Passed - Blood pressure on file in past 12 months     BP Readings from Last 1 Encounters:   12/10/19 140/80              metoprolol succinate (TOPROL-XL) 50 MG 24 hr tablet [Pharmacy Med Name: METOPROLOL ER 50MG  TAB] 90 tablet 3     Sig: TAKE 1 TABLET BY MOUTH ONCE DAILY       Beta-Blockers Refill Protocol Passed - 12/10/2019 12:24 PM        Passed - PCP or prescribing provider visit in past 12 months or next 3 months     Last office visit with prescriber/PCP: 10/31/2018 Héctor Nicholson MD OR same dept: 12/10/2019 Melissa Valente CNP OR same specialty: 12/10/2019 Melissa Valente CNP  Last physical: 4/19/2018 Last MTM visit: Visit date not found   Next visit within 3 mo: Visit date not found  Next physical within 3 mo: Visit date not found  Prescriber OR PCP: Héctor Nicholson MD  Last diagnosis associated with med order: 1. Essential hypertension  - lisinopril-hydrochlorothiazide (PRINZIDE,ZESTORETIC) 20-25 mg per tablet [Pharmacy Med Name: LISINOPRIL/HCTZ 20-25MG TAB]; TAKE 1 TABLET BY MOUTH ONCE DAILY  Dispense: 90 tablet; Refill: 3  - metoprolol succinate (TOPROL-XL) 50 MG 24 hr tablet [Pharmacy Med Name: METOPROLOL ER 50MG  TAB]; TAKE 1 TABLET BY MOUTH ONCE DAILY  Dispense: 90 tablet; Refill: 3    If protocol passes may refill for 12 months if within 3 months of last provider visit (or a total of 15 months).             Passed - Blood pressure filed in past 12 months     BP Readings from Last 1 Encounters:   12/10/19 140/80

## 2021-06-05 VITALS
WEIGHT: 226 LBS | BODY MASS INDEX: 29.02 KG/M2 | OXYGEN SATURATION: 94 % | DIASTOLIC BLOOD PRESSURE: 82 MMHG | TEMPERATURE: 98 F | HEART RATE: 83 BPM | SYSTOLIC BLOOD PRESSURE: 122 MMHG

## 2021-06-06 NOTE — TELEPHONE ENCOUNTER
Refill Approved    Rx renewed per Medication Renewal Policy. Medication was last renewed on 12/11/19.    Salome Carroll, [facility] Triage/Med Refill 3/3/2020     Requested Prescriptions   Pending Prescriptions Disp Refills     metoprolol succinate (TOPROL-XL) 50 MG 24 hr tablet [Pharmacy Med Name: Metoprolol Succinate ER 50 MG Oral Tablet Extended Release 24 Hour] 90 tablet 0     Sig: Take 1 tablet by mouth once daily       Beta-Blockers Refill Protocol Passed - 3/2/2020 12:57 PM        Passed - PCP or prescribing provider visit in past 12 months or next 3 months     Last office visit with prescriber/PCP: 12/26/2019 Héctor Porter MD OR same dept: 12/26/2019 Héctor Porter MD OR same specialty: 12/26/2019 Héctor Porter MD  Last physical: 4/19/2018 Last MTM visit: Visit date not found   Next visit within 3 mo: Visit date not found  Next physical within 3 mo: Visit date not found  Prescriber OR PCP: Héctor Porter MD  Last diagnosis associated with med order: 1. Essential hypertension  - metoprolol succinate (TOPROL-XL) 50 MG 24 hr tablet [Pharmacy Med Name: Metoprolol Succinate ER 50 MG Oral Tablet Extended Release 24 Hour]; Take 1 tablet by mouth once daily  Dispense: 90 tablet; Refill: 0  - lisinopriL-hydrochlorothiazide (PRINZIDE,ZESTORETIC) 20-25 mg per tablet [Pharmacy Med Name: Lisinopril-hydroCHLOROthiazide 20-25 MG Oral Tablet]; TAKE 1 TABLET BY MOUTH ONCE DAILY. **SCHEDULE A FASTING OFFICE VISIT WITH DR. PORTER BEFORE FURTHER REFILLS**  Dispense: 90 tablet; Refill: 0    If protocol passes may refill for 12 months if within 3 months of last provider visit (or a total of 15 months).             Passed - Blood pressure filed in past 12 months     BP Readings from Last 1 Encounters:   12/26/19 130/80             lisinopriL-hydrochlorothiazide (PRINZIDE,ZESTORETIC) 20-25 mg per tablet [Pharmacy Med Name: Lisinopril-hydroCHLOROthiazide 20-25 MG Oral Tablet] 90 tablet 0     Sig: TAKE 1 TABLET BY  MOUTH ONCE DAILY. **SCHEDULE A FASTING OFFICE VISIT WITH DR. PORTER BEFORE FURTHER REFILLS**       Diuretics/Combination Diuretics Refill Protocol  Passed - 3/2/2020 12:57 PM        Passed - Visit with PCP or prescribing provider visit in past 12 months     Last office visit with prescriber/PCP: 12/26/2019 Héctor Porter MD OR same dept: 12/26/2019 Héctor Porter MD OR same specialty: 12/26/2019 Héctor Porter MD  Last physical: 4/19/2018 Last MTM visit: Visit date not found   Next visit within 3 mo: Visit date not found  Next physical within 3 mo: Visit date not found  Prescriber OR PCP: Héctor Porter MD  Last diagnosis associated with med order: 1. Essential hypertension  - metoprolol succinate (TOPROL-XL) 50 MG 24 hr tablet [Pharmacy Med Name: Metoprolol Succinate ER 50 MG Oral Tablet Extended Release 24 Hour]; Take 1 tablet by mouth once daily  Dispense: 90 tablet; Refill: 0  - lisinopriL-hydrochlorothiazide (PRINZIDE,ZESTORETIC) 20-25 mg per tablet [Pharmacy Med Name: Lisinopril-hydroCHLOROthiazide 20-25 MG Oral Tablet]; TAKE 1 TABLET BY MOUTH ONCE DAILY. **SCHEDULE A FASTING OFFICE VISIT WITH DR. PORTER BEFORE FURTHER REFILLS**  Dispense: 90 tablet; Refill: 0    If protocol passes may refill for 12 months if within 3 months of last provider visit (or a total of 15 months).             Passed - Serum Potassium in past 12 months      Lab Results   Component Value Date    Potassium 4.2 12/26/2019             Passed - Serum Sodium in past 12 months      Lab Results   Component Value Date    Sodium 141 12/26/2019             Passed - Blood pressure on file in past 12 months     BP Readings from Last 1 Encounters:   12/26/19 130/80             Passed - Serum Creatinine in past 12 months      Creatinine   Date Value Ref Range Status   12/26/2019 1.15 0.70 - 1.30 mg/dL Final

## 2021-06-07 NOTE — PROGRESS NOTES
"Reddy Allen is a 79 y.o. male who is being evaluated via a billable telephone visit.      The patient has been notified of following:     \"This telephone visit will be conducted via a call between you and your physician/provider. We have found that certain health care needs can be provided without the need for a physical exam.  This service lets us provide the care you need with a short phone conversation.  If a prescription is necessary we can send it directly to your pharmacy.  If lab work is needed we can place an order for that and you can then stop by our lab to have the test done at a later time.    Telephone visits are billed at different rates depending on your insurance coverage. During this emergency period, for some insurers they may be billed the same as an in-person visit.  Please reach out to your insurance provider with any questions.    If during the course of the call the physician/provider feels a telephone visit is not appropriate, you will not be charged for this service.\"    Patient has given verbal consent to a Telephone visit? Yes    Patient would like to receive their AVS by AVS Preference: Mail a copy.    Assessment/Plan:    1. Rash  2. Rhinorrhea  Discussed unclear if rash is related to medication or viral illness.  Recommend discontinuing use of medication at this time.  Based on description, discussed patient could use hydrocortisone cream on the rash.  Discussed patient could also consider trying Allegra, Claritin or Benadryl; as well as Flonase nasal spray.  Patient states he will try these things and let us know if symptoms worsen or fail to respond.      Phone call duration:  5 minutes    Follow up: as needed    Khushi Cm MD  [facility]    Subjective:    Patient ID: Reddy Allen is a 79 y.o. male had telephone visit today for rash    Rash   -Patient reports over the past few days he has developed a runny nose and cold symptoms  -he reports taking coricidin " to help with the rhinorrhea and he thinks he had a reaction to this  -he reports developing a rash on his anterior chest, mildly itchy but not painful, approximately 4 inch patch, no drainage or bleeding, pink in color  -He reports never having a similar rash in the past, he reports using the medication many years ago and did not have a reaction at that time  -He has not tried any other medication  -Normal breathing and swallowing, no shortness of breath  -No fever  -No other medications tried      Exam:  General: Answering questions appropriately, linear thought process, does not sound short of breath, no cough heard    Patient Active Problem List   Diagnosis     Hypercholesterolemia     Paroxysmal Atrial Fibrillation     Rosacea     Trigger Finger (Acquired)     Hypertension     Lichen Planus     No past medical history on file.  No past surgical history on file.  Current Outpatient Medications on File Prior to Visit   Medication Sig Dispense Refill     levoFLOXacin (LEVAQUIN) 250 MG tablet Take 250 mg by mouth daily.       lisinopriL-hydrochlorothiazide (PRINZIDE,ZESTORETIC) 20-25 mg per tablet TAKE 1 TABLET BY MOUTH ONCE DAILY. **SCHEDULE A FASTING OFFICE VISIT WITH DR. PORTER BEFORE FURTHER REFILLS** 90 tablet 2     metoprolol succinate (TOPROL-XL) 50 MG 24 hr tablet Take 1 tablet by mouth once daily 90 tablet 2     MULTIVITS-MIN/HRB  (URINOZINC PROSTATE FORMULA ORAL) Take by mouth.       UNABLE TO FIND Med Name:temeric - twice a day - to help lower BS       No current facility-administered medications on file prior to visit.      No Known Allergies  Social History     Socioeconomic History     Marital status:      Spouse name: Not on file     Number of children: Not on file     Years of education: Not on file     Highest education level: Not on file   Occupational History     Not on file   Social Needs     Financial resource strain: Not on file     Food insecurity     Worry: Not on file      Inability: Not on file     Transportation needs     Medical: Not on file     Non-medical: Not on file   Tobacco Use     Smoking status: Former Smoker     Smokeless tobacco: Never Used   Substance and Sexual Activity     Alcohol use: No     Drug use: No     Sexual activity: Not on file   Lifestyle     Physical activity     Days per week: Not on file     Minutes per session: Not on file     Stress: Not on file   Relationships     Social connections     Talks on phone: Not on file     Gets together: Not on file     Attends Baptism service: Not on file     Active member of club or organization: Not on file     Attends meetings of clubs or organizations: Not on file     Relationship status: Not on file     Intimate partner violence     Fear of current or ex partner: Not on file     Emotionally abused: Not on file     Physically abused: Not on file     Forced sexual activity: Not on file   Other Topics Concern     Not on file   Social History Narrative     Not on file     No family history on file.  Review of systems is as stated in HPI, and the remainder of system review is otherwise negative.

## 2021-06-09 NOTE — PROGRESS NOTES
"Subjective:    Reddy Allen is seen today for follow-up evaluation.  Needs refill on blood pressure medication.  Continues lisinopril hydrochlorothiazide 20/25 1 tablet daily metoprolol succinate 50 mg daily with history of hypertension.  Prior paroxysmal atrial fibrillation described after surgery without recurrence.  No palpitations.  No chest pain.  Continues to work out on a treadmill regularly.  Quit smoking over 25 years ago.  Continued to run up until a year ago.  Feels as good as is felt in a long time.  States brother had aneurysm repair in abdomen.  Patient has not had AAA surveillance previously and declines at this time.  No abdominal complaints.  Declines all immunizations.  Declines colonoscopy.  States has completed advanced directives in the past.  Has high cholesterol elevation previously noted.  Has lost about 4 pounds apparently through improved dietary intake and increase exercise.    Remarried \"[place]\" x 25 years   1 son -   1 daughter -   5 grandchildren  1 great-grandchild   Work: Retired x 9 years from [employer] ()   Surgeries: 12 lap choly at [facility] (Dr. Wolf) after admit with pancreatitis, hepatitis, etc.   Hospitalizations: none   No smoke (quit 25 years ago after 2 ppd x 25 years)   No EtOH (no h/o problems)   Mom -  88 (CVA)   Dad -  77 (heart valve)   8 siblings - (3 ) - accidental broken neck after diving into a rock, lung CA (asbestos exposure), and cerebral hemorrhage   Pt is not interested in any vaccines-he had a reaction to the flu vaccine once and is not interested in any shots at all.-Griselda Segura, CMA 09   Going to [place], [place]  - Mar 2011   PATIENT NEEDS PNEUMOVAX VACCINATION     History reviewed. No pertinent surgical history.     History reviewed. No pertinent family history.     History reviewed. No pertinent past medical history.     Social History   Substance Use Topics     Smoking status: Former " Smoker     Smokeless tobacco: Never Used     Alcohol use No        Current Outpatient Prescriptions   Medication Sig Dispense Refill     lisinopril-hydrochlorothiazide (PRINZIDE,ZESTORETIC) 20-25 mg per tablet TAKE ONE TABLET BY MOUTH ONCE DAILY 90 tablet 3     metoprolol succinate (TOPROL-XL) 50 MG 24 hr tablet TAKE ONE TABLET (50 MG TOTAL) BY MOUTH ONCE DAILY. 90 tablet 3     MULTIVITS-MIN/HRB  (URINOZINC PROSTATE FORMULA ORAL) Take by mouth.       No current facility-administered medications for this visit.           Objective:    Vitals:    03/30/17 0850   BP: 126/80   Pulse: 80   SpO2: 96%   Weight: 222 lb (100.7 kg)      Body mass index is 28.89 kg/(m^2).    Alert.  No apparent distress.  Chest does appear clear currently without expiratory wheeze or inspiratory crackles.  Symmetric expansion.  Cardiac exam regular without significant cardiac murmur.  No palpitations.  Abdomen benign.  No palpable AAA however patient has prominent abdomen which makes palpation difficult.  Extremities warm and dry.  Demonstrates 1 small area of ecchymosis right lateral elbow from recent trauma otherwise no significant bruising or bleeding concerns identified.      Assessment:    1. Essential hypertension  Basic Metabolic Panel    lisinopril-hydrochlorothiazide (PRINZIDE,ZESTORETIC) 20-25 mg per tablet    metoprolol succinate (TOPROL-XL) 50 MG 24 hr tablet   2. Hypercholesterolemia  Lipid Cascade   3. Paroxysmal atrial fibrillation  Basic Metabolic Panel    Thyroid Stimulating Hormone (TSH)   4. Overweight (BMI 25.0-29.9)           Plan:    We will check basic metabolic panel today while fasting for both diabetes screen and medication monitoring.  Did provide refills on blood pressure medication as noted.  Repeat lipid cascade and recommend statin if  or greater however patient hesitant for onset new medication.  TSH with history of paroxysmal atrial fibrillation, resolved as well as suppressed TSH previously noted.   Patient declines abdominal ultrasound for AAA screening with 50-pack-year smoking history, hypertension, etc.  Weight goal less than 210 pounds initially, less than 200 pounds ideally.  Patient elects annual follow-up at this time.

## 2021-06-13 NOTE — PROGRESS NOTES
Assessment/Plan:    1. Essential hypertension  Hypertension improved control blood pressure 122/82 on recheck.  Refill on lisinopril hydrochlorothiazide 20/25 daily metoprolol succinate 50 mg daily.  Med monitoring completed with basic metabolic panel today while fasting.  - lisinopriL-hydrochlorothiazide (PRINZIDE,ZESTORETIC) 20-25 mg per tablet; Take 1 tablet by mouth daily.  Dispense: 90 tablet; Refill: 3  - metoprolol succinate (TOPROL-XL) 50 MG 24 hr tablet; Take 1 tablet (50 mg total) by mouth daily.  Dispense: 90 tablet; Refill: 3  - Basic Metabolic Panel    2. Rash  Rash associated with prior antibiotic use however likely coincidental.  Betamethasone 0.05% ointment sparingly topically to affected areas of extensor aspect of bilateral elbows on as-needed basis.  - betamethasone, augmented, (DIPROLENE) 0.05 % ointment; apply sparingly topically to affected areas twice daily as needed  Dispense: 45 g; Refill: 0    3. Paroxysmal atrial fibrillation (H)  History of paroxysmal atrial fibrillation without recurrent concerns.    4. Elevated prostate specific antigen (PSA)  History of PSA elevation felt secondary to infection and does follow-up with Dr. Pennington with [facility] in January 2021.  Prior PSA 18.16.    5. Hypercholesterolemia  Check lipid cascade today while fasting.  Declines statin therapy.  - Lipid [place]    Patient declines immunizations due to prior reaction with influenza vaccine historically.    The following high BMI interventions were performed this visit: encouragement to exercise, weight monitoring, weight loss from baseline weight and lifestyle education regarding diet .  Ensure ongoing efforts to achieve weight goal < 220 pounds initially, < 210 pounds ideally.         Subjective:    Reddymichelle Allen is seen today for follow-up assessment.  Underlying hypertension.  Last office visit December 26, 2019.  Hypertension.  Lisinopril hydrochlorothiazide 20/25 daily plus metoprolol succinate  "50 mg daily for hypertension management.  Tolerating well.  Needs refills.  History of paroxysmal atrial fibrillation previously postoperatively without recurrent concerns since episode in 2012.  Recent prostatitis described the PSA elevation 18.16 followed by Dr. Pennington and was treated with antibiotic which apparently caused a rash centrally located on his chest lasting a couple months then areas of involvement involving extensor aspect of elbows.  Has tried Zyrtec and Benadryl without benefit.  Area does tend to itch and has increased redness.  Comprehensive review of systems as above otherwise all negative.    Remarried \"[place]\" x 25 years   1 son -   1 daughter -   5 grandchildren  1 great-grandchild   Work: Retired x 9 years from [employer] ()   Surgeries: 12 lap choly at [facility] (Dr. Wolf) after admit with pancreatitis, hepatitis, etc. (postop a. fib described without reoccurrence)  Hospitalizations: none   No smoke (quit 25 years ago after 2 ppd x 25 years)   No EtOH (no h/o problems)   Mom -  88 (CVA)   Dad -  77 (heart valve)   8 siblings - (3 ) - accidental broken neck after diving into a rock, lung CA (asbestos exposure), and cerebral hemorrhage     History reviewed. No pertinent surgical history.     History reviewed. No pertinent family history.     History reviewed. No pertinent past medical history.     Social History     Tobacco Use     Smoking status: Former Smoker     Smokeless tobacco: Never Used   Substance Use Topics     Alcohol use: No     Drug use: No        Current Outpatient Medications   Medication Sig Dispense Refill     cetirizine (ZYRTEC) 10 MG tablet Take 1 tablet (10 mg total) by mouth daily. 30 tablet 2     fluticasone propionate (FLONASE ALLERGY RELIEF) 50 mcg/actuation nasal spray 1 spray into each nostril daily. 16 g 0     lisinopriL-hydrochlorothiazide (PRINZIDE,ZESTORETIC) 20-25 mg per tablet Take 1 tablet by mouth daily. " 90 tablet 3     metoprolol succinate (TOPROL-XL) 50 MG 24 hr tablet Take 1 tablet (50 mg total) by mouth daily. 90 tablet 3     MULTIVITS-MIN/HRB  (URINOZINC PROSTATE FORMULA ORAL) Take by mouth.       UNABLE TO FIND Med Name:temeric - twice a day - to help lower BS       betamethasone, augmented, (DIPROLENE) 0.05 % ointment apply sparingly topically to affected areas twice daily as needed 45 g 0     No current facility-administered medications for this visit.           Objective:    Vitals:    12/08/20 0740 12/08/20 0851   BP: 160/80 122/82   Pulse: 83    Temp: 98  F (36.7  C)    SpO2: 94%    Weight: (!) 226 lb (102.5 kg)       Body mass index is 29.02 kg/m .    Alert.  No apparent distress.  Cardiac exam regular.  Chest clear.  Mild erythematous appearance extensor aspect bilateral elbows.  No significant rash identified anterior trunk or chest.  Blood pressure recheck 122/82.      This note has been dictated using voice recognition software and as a result may contain minor grammatical errors and unintended word substitutions.

## 2021-06-15 ENCOUNTER — AMBULATORY - HEALTHEAST (OUTPATIENT)
Dept: NURSING | Facility: CLINIC | Age: 81
End: 2021-06-15

## 2021-06-15 NOTE — PROGRESS NOTES
"Assessment:    1. Atypical squamoproliferative skin lesion  Ambulatory referral to Dermatology    triamcinolone (KENALOG) 0.1 % cream   2. Acne rosacea     3. Essential hypertension     4. Paroxysmal atrial fibrillation     5. Hypercholesterolemia     6. Overweight (BMI 25.0-29.9)           Plan:    Discussed atypical squamous proliferative skin lesion involving right lateral nose and cheek.  Did provide triamcinolone 0.1% cream to be applied sparingly topically twice daily ×14 days as needed question component of seborrheic keratosis versus other.  Referred to dermatology with noted acne rosacea as well.  Continues hypertension and atrial fibrillation management.  No evidence for recurrence with appearance of normal sinus rhythm on exam demonstrated.  Weight goal remains less than 210 pounds initially, less than 200 pounds ideally.  Annual wellness visit within next 3 months and will complete fasting labs at that time with history of hypercholesterolemia etc.        Subjective:    Reddy Allen is seen today for skin lesions on right side of bridge of nose and right cheek.  Symptoms over past 6-8 weeks.  Come and go.  No ulceration.  Denies personal history of skin cancer or family history significant concerns.  Does have evidence for acne rosacea.  Underlying history of hypertension.  Paroxysmal atrial fibrillation however remains in apparent sinus rhythm and patient denies any knowledge of palpitations, presyncopal symptoms etc.  Has had mild cholesterol elevation, diet controlled.  Has gained 5 pounds since prior visit March 30, 2017 and have discussed weight goal previously of less than 210 pounds initially, less than 200 pounds ideally.    Remarried \"[place]\" x 25 years   1 son -   1 daughter -   5 grandchildren  1 great-grandchild   Work: Retired x 9 years from [employer] ()   Surgeries: 1/17/12 ayla schrader at [facility] (Dr. Wolf) after admit with pancreatitis, hepatitis, etc. "   Hospitalizations: none   No smoke (quit 25 years ago after 2 ppd x 25 years)   No EtOH (no h/o problems)   Mom -  88 (CVA)   Dad -  77 (heart valve)   8 siblings - (3 ) - accidental broken neck after diving into a rock, lung CA (asbestos exposure), and cerebral hemorrhage   Pt is not interested in any vaccines-he had a reaction to the flu vaccine once and is not interested in any shots at all.-Griselda Segura, CMA 09   Going to [place], [place]  - Mar 2011   PATIENT NEEDS PNEUMOVAX VACCINATION     No past surgical history on file.     No family history on file.     No past medical history on file.     Social History   Substance Use Topics     Smoking status: Former Smoker     Smokeless tobacco: Never Used     Alcohol use No        Current Outpatient Prescriptions   Medication Sig Dispense Refill     lisinopril-hydrochlorothiazide (PRINZIDE,ZESTORETIC) 20-25 mg per tablet TAKE ONE TABLET BY MOUTH ONCE DAILY 90 tablet 3     metoprolol succinate (TOPROL-XL) 50 MG 24 hr tablet TAKE ONE TABLET (50 MG TOTAL) BY MOUTH ONCE DAILY. 90 tablet 3     MULTIVITS-MIN/HRB  (URINOZINC PROSTATE FORMULA ORAL) Take by mouth.       triamcinolone (KENALOG) 0.1 % cream apply topically to affected areas twice daily as needed 30 g 0     No current facility-administered medications for this visit.           Objective:    Vitals:    18 1023 18 1110   BP: 140/70 134/74   Pulse: 80    Weight: (!) 227 lb (103 kg)       Body mass index is 29.54 kg/(m^2).    Alert.  Hard of hearing.  Cooperative otherwise.  Blood pressure 134/74 on recheck.  Skin exam showing evidence for seborrheic keratoses on bridge of nose and right cheek versus atypical squamous proliferative lesions.  No rhinophyma.  Telangiectasias involving cheek and bridge of nose also present consistent with rosacea.  Cardiac exam regular.  Chest clear.  Extremities warm and dry.

## 2021-06-17 NOTE — PROGRESS NOTES
"Assessment/Plan:    1. Acute sinusitis  Reviewed current concerns consistent with acute sinusitis with postnasal drip.  Z-Mark was prescribed.  CBC completed which was normal today.  - azithromycin (ZITHROMAX) 250 MG tablet; Take 2 tabs on day one, and then 1 tab on days 2-5.  Dispense: 6 tablet; Refill: 0    2. Fever  Fever, resolved.  Influenza testing negative.  CBC normal.  - Influenza A/B Rapid Test  - HM2(CBC w/o Differential)    3. Fatigue  Fatigue likely secondary to recent illness.  Anticipate self-limited.  Notify persistent concerns or worsening with described 6 pound weight lysis associated with recent illness.  - HM2(CBC w/o Differential)    4. Cough  Cough described as productive secondary to postnasal drainage otherwise does not feel that it is into his chest.  No shortness of breath.  No hemoptysis.  Notify persistent concerns.            Subjective:    Reddy Allen is seen today for recent illness.  Symptoms were past 8-9 days.  Started with sore throat.  Phlegm production.  Cough and sneezing.  Has lost 6 pounds with decreased appetite.  No abdominal distention.  Some loose stools at times otherwise no constipation.  No fever.  No recent travel.  Has had pneumonia in the past and does not feel that this is pneumonia.  Denies shortness of breath.  No chest discomfort.  Remainder of home medications continue including lisinopril hydrochlorothiazide / using 1 tab daily metoprolol succinate 50 mg daily for hypertension management and underlying history of paroxysmal atrial fibrillation.    Remarried \"[place]\" x 25 years   1 son -   1 daughter -   5 grandchildren  1 great-grandchild   Work: Retired x 9 years from [employer] ()   Surgeries: 12 ayla schrader at [facility] (Dr. Wolf) after admit with pancreatitis, hepatitis, etc.   Hospitalizations: none   No smoke (quit 25 years ago after 2 ppd x 25 years)   No EtOH (no h/o problems)   Mom -  88 (CVA)   Dad -  77 " "(heart valve)   8 siblings - (3 ) - accidental broken neck after diving into a rock, lung CA (asbestos exposure), and cerebral hemorrhage   Pt is not interested in any vaccines-he had a reaction to the flu vaccine once and is not interested in any shots at all.-Griselda Segura, CMA 09   Going to [place], [place]  - Mar 2011   PATIENT NEEDS PNEUMOVAX VACCINATION     History reviewed. No pertinent surgical history.     History reviewed. No pertinent family history.     History reviewed. No pertinent past medical history.     Social History   Substance Use Topics     Smoking status: Former Smoker     Smokeless tobacco: Never Used     Alcohol use No        Current Outpatient Prescriptions   Medication Sig Dispense Refill     aspirin 81 MG EC tablet Take 1 tablet (81 mg total) by mouth daily. 150 tablet 2     lisinopril-hydrochlorothiazide (PRINZIDE,ZESTORETIC) 20-25 mg per tablet TAKE ONE TABLET BY MOUTH ONCE DAILY 90 tablet 2     metoprolol succinate (TOPROL-XL) 50 MG 24 hr tablet TAKE ONE TABLET BY MOUTH ONCE DAILY 90 tablet 2     MULTIVITS-MIN/HRB  (URINOZINC PROSTATE FORMULA ORAL) Take by mouth.       triamcinolone (KENALOG) 0.1 % cream apply topically to affected areas twice daily as needed 30 g 0     azithromycin (ZITHROMAX) 250 MG tablet Take 2 tabs on day one, and then 1 tab on days 2-5. 6 tablet 0     No current facility-administered medications for this visit.           Objective:    Vitals:    18 1059   BP: 124/62   Patient Site: Left Arm   Patient Position: Sitting   Cuff Size: Adult Regular   Pulse: 80   Temp: 98.9  F (37.2  C)   Weight: 216 lb 11.2 oz (98.3 kg)   Height: 6' 2\" (1.88 m)      Body mass index is 27.82 kg/(m^2).    Alert.  No apparent distress.  Nontoxic.  No significant cough.  No respiratory distress.  Pulse rate is regular without cardiac ectopy or significant murmur.  Chest appears relatively clear throughout the lung fields without inspiratory crackle or " expiratory wheeze.  HEENT exam with nasal congestion noted scant postnasal drainage and oropharyngeal exam otherwise moist mucous membranes.  Neck supple.      This note has been dictated using voice recognition software and as a result may contain minor grammatical errors and unintended word substitutions.

## 2021-06-17 NOTE — PROGRESS NOTES
"Assessment/Plan:    1. Paronychia of great toe, right  Paronychia right great toe.  Triamcinolone 0.1% cream twice daily over next 10-14 days at the proximal nail margin.  Nail somewhat dystrophic in nature and would consider avulsion procedure if persistent concerns.  - triamcinolone (KENALOG) 0.1 % cream; apply topically to affected areas twice daily as needed  Dispense: 30 g; Refill: 0    2. Atypical squamoproliferative skin lesion  Patient had been prescribed triamcinolone cream for atypical squamous proliferative skin lesion 2018 and will reassess at scheduled follow-up 2018.          Subjective:    Reddy Allen is seen today for right great toe redness.  More proximal area.  Some skin irritation.  No drainage.  No trauma.  Does bother him when he is walking on a treadmill.  Has not had nail avulsion procedure in the past however does have a very curved nail in general.    Remarried \"[place]\" x 25 years   1 son -   1 daughter -   5 grandchildren  1 great-grandchild   Work: Retired x 9 years from [email or web address] ()   Surgeries: 12 ayla schrader at [facility] (Dr. Wolf) after admit with pancreatitis, hepatitis, etc.   Hospitalizations: none   No smoke (quit 25 years ago after 2 ppd x 25 years)   No EtOH (no h/o problems)   Mom -  88 (CVA)   Dad -  77 (heart valve)   8 siblings - (3 ) - accidental broken neck after diving into a rock, lung CA (asbestos exposure), and cerebral hemorrhage   Pt is not interested in any vaccines-he had a reaction to the flu vaccine once and is not interested in any shots at all.-Griselda Segura, CMA 09   Going to [place], [place]  - Mar 2011   PATIENT NEEDS PNEUMOVAX VACCINATION     History reviewed. No pertinent surgical history.     History reviewed. No pertinent family history.     History reviewed. No pertinent past medical history.     Social History   Substance Use Topics     Smoking status: Former Smoker     " Smokeless tobacco: Never Used     Alcohol use No        Current Outpatient Prescriptions   Medication Sig Dispense Refill     lisinopril-hydrochlorothiazide (PRINZIDE,ZESTORETIC) 20-25 mg per tablet TAKE ONE TABLET BY MOUTH ONCE DAILY 90 tablet 2     metoprolol succinate (TOPROL-XL) 50 MG 24 hr tablet TAKE ONE TABLET BY MOUTH ONCE DAILY 90 tablet 2     MULTIVITS-MIN/HRB  (URINOZINC PROSTATE FORMULA ORAL) Take by mouth.       triamcinolone (KENALOG) 0.1 % cream apply topically to affected areas twice daily as needed 30 g 0     No current facility-administered medications for this visit.           Objective:    Vitals:    03/29/18 1054 03/29/18 1121   BP: 144/80 146/78   Pulse: 80    Weight: (!) 227 lb (103 kg)       Body mass index is 29.54 kg/(m^2).    Alert.  No apparent distress.  Right great toe with mild paronychia proximally without drainage.  No purulence.  Dystrophic nail changes with significant narrowing of nail distally.      This note has been dictated using voice recognition software and as a result may contain minor grammatical errors and unintended word substitutions.

## 2021-06-17 NOTE — PROGRESS NOTES
Assessment and Plan:       1. Encounter for general adult medical examination with abnormal findings  Annual wellness visit completed.  Patient declines immunizations, colonoscopy etc.  Annual wellness visits to continue.  Risks associated with suboptimal diet.  Declines completion of advance directives.  Utilizes bilateral hearing aids secondary to hearing loss.  Fasting glucose for diabetes screen.    2. Overweight (BMI 25.0-29.9)  Therapeutic lifestyle changes reviewed for weight goal less than 210 pounds initially, less than 200 pounds ideally.    3. Essential hypertension  Hypertension at goal.  Continue lisinopril hydrochlorothiazide 20/25 using 1 tab daily metoprolol succinate 50 mg daily.  Basic metabolic panel for med monitoring.  - Basic Metabolic Panel    4. Hypercholesterolemia  Repeat lipid cascade regarding history of hypercholesterolemia, diet-controlled.  Lifestyle modifications as noted above for weight goal less than 210 pounds initially, less than 200 pounds ideally.  - Lipid Cascade    5. Paroxysmal atrial fibrillation  History of paroxysmal atrial fibrillation status post cholecystectomy surgery apparently.  No recurrence.  No palpitations.  Did instruct patient to use aspirin 81 mg daily which he will initiate.  - aspirin 81 MG EC tablet; Take 1 tablet (81 mg total) by mouth daily.  Dispense: 150 tablet; Refill: 2    6. Bilateral hearing loss  Bilateral eustachian tube dysfunction likely contributing to current concerns.  Can try fluticasone nasal spray 2 sprays per nostril daily ×30 days to determine if eustachian tube dysfunction improvement.  Continue bilateral hearing aid utilization and follow-up for audiology recheck otherwise no evidence for ceruminosis, etc.    7. Right inguinal hernia, reducible        The patient's current medical problems were reviewed.    I have had an Advance Directives discussion with the patient.  The following high BMI interventions were performed this visit:  "encouragement to exercise, weight monitoring, weight loss from baseline weight and lifestyle education regarding diet.  Ensure ongoing efforts to achieve weight goal < 210 pounds initially, < 200 pounds ideally.     The following health maintenance schedule was reviewed with the patient and provided in printed form in the after visit summary:   Health Maintenance   Topic Date Due     INFLUENZA VACCINE RULE BASED (1) 08/01/2017     FALL RISK ASSESSMENT  03/29/2019     ADVANCE DIRECTIVES DISCUSSED WITH PATIENT  03/30/2022     TD 18+ HE  03/30/2027     COLONOSCOPY  03/30/2027     PNEUMOCOCCAL POLYSACCHARIDE VACCINE AGE 65 AND OVER  Addressed     PNEUMOCOCCAL CONJUGATE VACCINE FOR ADULTS (PCV13 OR PREVNAR)  Addressed     ZOSTER VACCINE  Addressed        Subjective:     Chief Complaint: Reddy Allen is an 77 y.o. male here for an Annual Wellness visit.     HPI: Patient seen today for annual wellness visit.  In general doing well.  States that he feels better than he has in years.  Exercising both cardiovascular and resistance training.  Continues lisinopril hydrochlorothiazide 20/25 1 tablet daily plus metoprolol succinate 50 mg daily for hypertension.  Has had cholesterol elevation which is diet controlled.  Bilateral hearing loss using hearing aids.  Seems to be worse over past 4-5 months better with jaw movement.  Wondering about cerumen impaction.  Long-standing right inguinal hernia without current symptoms and declines intervention.  Paroxysmal atrial fibrillation apparently following prior cholecystectomy surgery without recurrence.  Patient resistant to immunizations, colonoscopy, advanced directive completion etc.    Review of Systems:  Please see above.  The rest of the review of systems are negative for all systems.    Remarried \"[place]\" x 25 years   1 son -   1 daughter -   5 grandchildren  1 great-grandchild   Work: Retired x 9 years from  ()   Surgeries: 1/17/12 ayla schrader at [facility] (Dr. Wolf) after admit with pancreatitis, hepatitis, etc.   Hospitalizations: none   No smoke (quit 25 years ago after 2 ppd x 25 years)   No EtOH (no h/o problems)   Mom -  88 (CVA)   Dad -  77 (heart valve)   8 siblings - (3 ) - accidental broken neck after diving into a rock, lung CA (asbestos exposure), and cerebral hemorrhage   Pt is not interested in any vaccines-he had a reaction to the flu vaccine once and is not interested in any shots at all.-Griselda Segura, CMA 09   Going to [place], [place]  - Mar 2011   PATIENT NEEDS PNEUMOVAX VACCINATION       Patient Care Team:  Héctor Nicholson MD as PCP - General     Patient Active Problem List   Diagnosis     Hypercholesterolemia     Paroxysmal Atrial Fibrillation     Rosacea     Trigger Finger (Acquired)     Hypertension     Lichen Planus     History reviewed. No pertinent past medical history.   History reviewed. No pertinent surgical history.   History reviewed. No pertinent family history.   Social History     Social History     Marital status:      Spouse name: N/A     Number of children: N/A     Years of education: N/A     Occupational History     Not on file.     Social History Main Topics     Smoking status: Former Smoker     Smokeless tobacco: Never Used     Alcohol use No     Drug use: No     Sexual activity: Not on file     Other Topics Concern     Not on file     Social History Narrative      Current Outpatient Prescriptions   Medication Sig Dispense Refill     lisinopril-hydrochlorothiazide (PRINZIDE,ZESTORETIC) 20-25 mg per tablet TAKE ONE TABLET BY MOUTH ONCE DAILY 90 tablet 2     metoprolol succinate (TOPROL-XL) 50 MG 24 hr tablet TAKE ONE TABLET BY MOUTH ONCE DAILY 90 tablet 2     MULTIVITS-MIN/HRB  (URINOZINC PROSTATE FORMULA ORAL) Take by mouth.       triamcinolone (KENALOG) 0.1 % cream apply topically to affected areas twice daily as needed 30 g 0     aspirin 81 MG EC tablet Take 1 tablet  "(81 mg total) by mouth daily. 150 tablet 2     No current facility-administered medications for this visit.       Objective:   Vital Signs:   Visit Vitals     /78     Pulse 79     Ht 6' 0.5\" (1.842 m)     Wt 222 lb (100.7 kg)     SpO2 95%     BMI 29.69 kg/m2        VisionScreening:  No exam data present     PHYSICAL EXAM    General Appearance:    Alert, cooperative, no distress, appears stated age.  Overweight.   Head:    Normocephalic, without obvious abnormality, atraumatic   Eyes:    PERRL, conjunctiva/corneas clear, EOM's intact, fundi     benign, both eyes.  Glasses.        Ears:    Normal TM's and external ear canals, both ears.  Bilateral hearing aids utilized.   Nose:   Nares normal, septum midline, mucosa normal, no drainage    or sinus tenderness   Throat:   Lips, mucosa, and tongue normal; teeth and gums normal   Neck:   Supple, symmetrical, trachea midline, no adenopathy;        thyroid:  No enlargement/tenderness/nodules; no carotid    bruit or JVD   Back:     Symmetric, no curvature, ROM normal, no CVA tenderness   Lungs:     Clear to auscultation bilaterally, respirations unlabored   Chest wall:    No tenderness or deformity   Heart:    Regular rate and rhythm, S1 and S2 normal, no murmur, rub   or gallop   Abdomen:     Soft, non-tender, bowel sounds active all four quadrants,     no masses, no organomegaly.     Genitalia:    Normal male without lesion, discharge or tenderness.  Right inguinal hernia noted, moderate, reducible.     Rectal:    patient declines due to recent \"diarrhea\"   Extremities:   Extremities normal, atraumatic, no cyanosis or edema   Pulses:   2+ and symmetric all extremities   Skin:   Skin color, texture, turgor normal, no rashes or lesions   Lymph nodes:   Cervical, supraclavicular, and axillary nodes normal   Neurologic:   CNII-XII intact. Normal strength, sensation and reflexes       throughout        Assessment Results 4/19/2018   Activities of Daily Living No help " needed   Instrumental Activities of Daily Living No help needed   Get Up and Go Score Less than 12 seconds   Mini Cog Total Score 4   Some recent data might be hidden     A Mini-Cog score of 0-2 suggests the possibility of dementia, score of 3-5 suggests no dementia    Identified Health Risks:     The patient was counseled and encouraged to consider modifying their diet and eating habits. He was provided with information on recommended healthy diet options.  The patient was provided with written information regarding signs of hearing loss.  Information regarding advance directives (living [place]), including where he can download the appropriate form, was provided to the patient via the AVS.

## 2021-06-18 NOTE — PATIENT INSTRUCTIONS - HE
Patient Instructions by Ravinder Napier PA-C at 4/23/2020 12:00 PM     Author: Ravinder Napier PA-C Service: -- Author Type: Physician Assistant    Filed: 4/23/2020 12:34 PM Encounter Date: 4/23/2020 Status: Addendum    : Ravinder Napier PA-C (Physician Assistant)    Related Notes: Original Note by Ravinder Napier PA-C (Physician Assistant) filed at 4/23/2020 12:33 PM       Discontinue use of the Levaquin.  You have contacted your urologist office and they did not recommend an alternative therapy for your original treatment.  Discontinue use of the Benadryl and the Coricidin as you are most likely getting too much antihistamine.  Instead, you will use Zyrtec (cetirizine) 10 mg once daily for the antihistamine effect.  Discontinue if you get dry mouth dizziness fatigue or urinary retention as these all may be bad signs or complications of taking the Zyrtec.  Use of the nasal steroid to help with runny nose and congestion.  Use the crossover technique with using the right hand to the left nostril and left and 2 right nostril to avoid bloody noses.  You may use your mask but be aware that temperature and pressure may make your itching and rash worse.  Avoid extremes of temperature hot or cold air or vigorous exercise.  Follow-up if you are not getting good resolution of the rash or if new symptoms or concerns arise.      Patient Education     Reaction to Medicine (Other Type)  You are having a reaction to a medicine you have taken. This may not be the same as an allergic reaction. It is an undesired, unfavorable reaction, or a side effect of a medicine. This can cause a variety of symptoms, including:    Dizziness or headache    Rash    Flushing or hot sensation    Nausea, vomiting, or stomach pain    Diarrhea or constipation    Trouble breathing    High or low blood pressure  A reaction can be an upset stomach from something like aspirin or ibuprofen, feeling faint after taking a blood pressure medicine, feeling  anxious, and many other things. Symptoms of a medicine reaction can range from very mild to very severe.  In most cases, the reaction goes away within 1 to 12 hours. But it will probably occur again if you take this same medicine. Your healthcare provider will advise you whether to change how much, when, or how often you take this medicine. He or she may also advise you to discontinue this medicine or switch to another one.   Home care    Another medicine may be recommended to reduce your symptoms until the medicines effect wears off. Follow your healthcare providers advice.    When the medicines effect has worn off, there should be no further problem as long as you don't take the same medicine again.     Ask your healthcare provider if you should also avoid similar medicines. Write down the information so you will remember it.    Make certain the medicine reaction is documented in your medical record.  Follow-up care  Follow up with your healthcare provider, or as advised if your symptoms are not better within 24 hours.  When to seek medical advice  Call your healthcare provider right away if any of these occur.    New symptoms that concern you    Worsening of your current symptoms, including rash or facial swelling    Symptoms that are not relieved by the treatment advised    Fever of 100.4 F (38 C) or higher, or as advised by your healthcare provider  Call 911  Call 911 if any of these occur:    Trouble breathing or swallowing, or wheezing    Hoarse voice or trouble speaking    Confusion    Extreme drowsiness or trouble awakening    Fainting or loss of consciousness    Rapid heart rate or slow heart rate    Very low or very high blood pressure    Vomiting blood, or large amounts of blood in stool    Seizure  Date Last Reviewed: 4/1/2017 2000-2017 [employer]. [address] [place] [address], [place] [address]. All rights reserved. This information is not intended as a substitute for professional medical  care. Always follow your healthcare professional's instructions.           Patient Education     Medicine Reaction: Allergic  You are having an allergic reaction to a medicine you have taken. This causes an itchy rash and sometimes swelling of various parts of the body. It may also cause trouble swallowing or breathing. The rash may take a few hours or up to 2 weeks to go away. In the future, remember to tell your healthcare provider about your allergy to this medicine so that medicines of this type won't be used again.  Any medicine can cause an allergic reaction. But the most allergic reactions are caused by:    Penicillin and related medicines    Aspirin    Ibuprofen    Seizure medicines  Vaccines may also trigger allergies. People whose parents or siblings have allergies are at a higher risk of developing a medicine allergy. Allergy testing may sometimes be needed to figure out the cause.  Symptoms may occur within minutes, hours, or even weeks after exposure to the medicine. It can be a mild or severe reaction, or potentially life threatening. Most of us think of allergic reactions when we have a rash or itchy skin. Symptoms can include:    Rash, hives, redness, welts, blisters    Itching, burning, stinging, pain    Dry, flaky, cracking, scaly skin    Belly (abdominal) cramps or nausea or stomach pain    Fever.  Sometimes fever is the only symptom of a drug reaction. In older adults, the risk of fever increases with the number of medicines the person takes.  More severe symptoms include:    Swelling of the face or lips, or drooling    Trouble swallowing, feeling like your throat is closing    Trouble breathing, wheezing    Hoarse voice or trouble speaking    Severe nausea or vomiting or diarrhea      Feeling faint or lightheaded, rapid heart rate  Home care    The goal of treatment is to help relieve the symptoms, and get you feeling better. Mild to medium medicine reactions usually respond quickly to  antihistamines and steroids. The rash will usually fade over several days. But it can sometimes last a couple of weeks. Over the next couple of days, there may be times when it is gets a little worse, and then better again. Here are some things to do:    Throw the medicine away and dont take it again. The next reaction may be much worse.    Add this medicine reaction to your electronic medical record.    When getting a new medicine, always tell the healthcare provider that you are allergic to this medicine. Make certain the provider writes it down in your medical record.    Avoid tight clothing and anything that heats up your skin (hot showers or baths, direct sunlight). Heat will make itching worse.    An ice pack will relieve local areas of intense itching and redness. To make an ice pack, put ice cubes in a plastic bag that seals at the top. Wrap the bag in a clean, thin towel or cloth. Dont put ice directly on the skin.    To help prevent an infection, don't scratch the affected area. Scratching may worsen the reaction. It can damage your skin and lead to an infection. Always check the affected site for signs of an infection.    Your provider may give you a prescription antihistamine.    If you are not given a prescription antihistamine, oral diphenhydramine is an over-the-counter antihistamine available at pharmacies and grocery stores. This may be used to reduce itching if large areas of the skin are involved. This antihistamine may make you sleepy, so be careful using it in the daytime or when going to school, working, or driving. Note: Dont use diphenhydramine if you have glaucoma or if you are a man with trouble urinating due to an enlarged prostate. There are other antihistamines that cause less drowsiness and are a good choice for daytime use. Ask your pharmacist for suggestions.    Don't use diphenhydramine cream on your skin. It can cause a further skin reaction for some people.    Contact your  healthcare provider and ask what can be used on the affected area to help decrease the itching.  Follow-up care  Follow up with your healthcare provider, or as advised if your symptoms do not continue to improve or they get worse.  Call 911  Call 911 if any of these occur:    Shortness of breath    Cool, moist, pale skin    Swelling in the face, eyelids, mouth, tongue, or lips    Drooling    Trouble breathing or swallowing, wheezing    New or worsening swelling in the mouth, throat, or tongue    Hoarse voice or trouble speaking     Fainting or loss of consciousness    Rapid heart rate    Feeling of dizziness or weakness or a sudden drop in blood pressure    Feeling of doom    Feeling lightheaded    Severe nausea, vomiting, or diarrhea  When to seek medical advice  Call your healthcare provider right away if any of these occur:    Continuing or recurring symptoms    Nausea, abdominal cramps or stomach pain    Spreading areas of itching, redness or swelling    Signs of infection:  ? Spreading redness  ? Increased pain or swelling  ? Fever of 100.4 F (38 C) or above lasting for 24 to 48 hours, or as directed by your provider  ? Fluid or colored drainage from the affected area  Date Last Reviewed: 3/1/2017    5597-8193 [employer]. [address] [place] [address]. All rights reserved. This information is not intended as a substitute for professional medical care. Always follow your healthcare professional's instructions.           Patient Education     Allergy Medicines: Over-the-Counter    You can buy many allergy medicines over the counter (OTC) without a prescription. It's important to remember:    You may use the OTC medicine alone or with prescription medicine.    Always use all medicines exactly as instructed.    If you take prescription medicines, check with your pharmacist about possible medicine interactions before taking OTC medicines.    If you have any questions about your allergy  medicine, ask your healthcare provider or your pharmacist.  There are many OTC allergy medicines including antihistamines, decongestants, and steroid nasal sprays. And there are combination products. For example, a pill with both an antihistamine and a decongestant. You may also be advised to take more than one medicine. For example, a steroid nasal spray and an antihistamine pill.  Antihistamines  Antihistamines block the response to histamine. Histamine is the substance released by your body that causes many allergy symptoms. Antihistamines help reduce sneezing, itching, and runny noses.   These medicines:    Are available over the counter as pills or liquids    May cause you to be sleepy    Should be taken as directed    Don't work well at easing nasal congestion  Decongestants  Decongestants reduce swelling of the nasal passages. They ease pressure and pain in your sinuses.  Things to know about these medicines:    Decongestants are available as pills, liquids, and nasal sprays.    Decongestant nasal spray should not be used for more than a few days. Overuse can actually make your symptoms worse by causing a rebound effect. This can make some people feel that they have a cold that just won't go away.    Decongestant pills are now kept behind the counter. You don't need a prescription for these medicines. But you must ask the pharmacist for them.    Decongestant pills should not be used by people with high blood pressure. If you take high blood pressure medicines, ask your healthcare provider how to treat congestion.  Steroid nasal sprays  Steroid nasal sprays are used for nasal allergy symptoms. They help to reduce nasal congestion and swelling, runny noses, and sneezing.  Steroid nasal sprays:    Shouldn't be used without your healthcare provider's advice    Can cause side effects, such as nasal bleeding    Should be sprayed into the nose correctly    May take up to 2 weeks to be fully effective. Daily use of  these medicines often leads to the best results. Follow the instructions for how long to use the medicine.  Always read and follow the instructions on the label. If you have any questions about these medicines, ask your healthcare provider or pharmacist.  Date Last Reviewed: 9/1/2016 2000-2019 [employer]. [address] [place] [address]. All rights reserved. This information is not intended as a substitute for professional medical care. Always follow your healthcare professional's instructions.

## 2021-06-20 NOTE — LETTER
Letter by Héctor Nicholson MD at      Author: Héctor Nicholson MD Service: -- Author Type: --    Filed:  Encounter Date: 12/27/2019 Status: Signed         Reddy Allen  [address] [place] [address]             December 27, 2019         Dear Mr. Allen,    Below are the results from your recent visit:    Resulted Orders   Basic Metabolic Panel   Result Value Ref Range    Sodium 141 136 - 145 mmol/L    Potassium 4.2 3.5 - 5.0 mmol/L    Chloride 103 98 - 107 mmol/L    CO2 27 22 - 31 mmol/L    Anion Gap, Calculation 11 5 - 18 mmol/L    Glucose 96 70 - 125 mg/dL    Calcium 9.8 8.5 - 10.5 mg/dL    BUN 22 8 - 28 mg/dL    Creatinine 1.15 0.70 - 1.30 mg/dL    GFR MDRD Af Amer >60 >60 mL/min/1.73m2    GFR MDRD Non Af Amer >60 >60 mL/min/1.73m2    Narrative    Fasting Glucose reference range is 70-99 mg/dL per  American Diabetes Association (ADA) guidelines.   HM2(CBC w/o Differential)   Result Value Ref Range    WBC 5.6 4.0 - 11.0 thou/uL    RBC 4.79 4.40 - 6.20 mill/uL    Hemoglobin 15.4 14.0 - 18.0 g/dL    Hematocrit 43.8 40.0 - 54.0 %    MCV 91 80 - 100 fL    MCH 32.2 27.0 - 34.0 pg    MCHC 35.2 32.0 - 36.0 g/dL    RDW 11.8 11.0 - 14.5 %    Platelets 258 140 - 440 thou/uL    MPV 7.3 7.0 - 10.0 fL       Your kidney function tests (i.e. Basic Metabolic Panel) were normal.     Your complete blood count results were normal.  No evidence for anemia, etc.     Please call with questions or contact us using [employer].    Sincerely,        Electronically signed by Héctor Nicholson MD

## 2021-06-21 NOTE — PROGRESS NOTES
Assessment/Plan:    1. Essential hypertension  Hypertension, stable.  Blood pressure 126/72 on recheck.  Continues lisinopril hydrochlorothiazide 20/25 using 1 tab daily metoprolol succinate 50 mg daily.  Repeat basic metabolic panel for medication monitoring.  Nonfasting.  - Basic Metabolic Panel    2. Hypercholesterolemia  Has had mild cholesterol elevation historically.  Dietary and exercise modifications reviewed for weight goal less than 210 pounds ideally.    3. Paroxysmal atrial fibrillation (H)  History of postoperative paroxysmal atrial fibrillation January 2012 without recurrence.  Continues aspirin 81 mg daily.    4. Overweight (BMI 25.0-29.9)  Overweight status as noted above.  Weight goal less than 210 pounds ideally.    5. Acne rosacea  MetroGel 1% topically twice daily to affected areas.  Reassess at annual wellness visit 6 months, sooner with concerns.  Has been seen by dermatologist in the past however patient declines follow-up at this time.  - metroNIDAZOLE (METROGEL) 1 % gel; apply topically to affected areas twice daily  Dispense: 60 g; Refill: 2    The following high BMI interventions were performed this visit: encouragement to exercise, weight monitoring, weight loss from baseline weight and lifestyle education regarding diet.  Ensure ongoing efforts to achieve weight goal < 210 pounds ideally.         Subjective:    Reddy Allen is seen today for follow-up evaluation.  Underlying hypertension.  Not checking blood pressures on a consistent basis.  Nonfasting today.  Has had mild cholesterol elevation in the past however describes it as improved.  Postoperative atrial fibrillation x1 back in January 2012 without recurrence.  No presyncopal symptoms.  No palpitations.  No chest pain.  Tolerating lisinopril hydrochlorothiazide 20/25 using 1 tablet daily and metoprolol succinate 50 mg daily without dizziness with position change etc.  Scaliness on bridge of nose.  Has tried triamcinolone cream  "without benefit.  Has seen dermatologist in the past with known history of acne rosacea however patient states \"this is gone\".  Comprehensive review of systems as above otherwise all negative.    Remarried \"[place]\" x 25 years   1 son -   1 daughter -   5 grandchildren  1 great-grandchild   Work: Retired x 9 years from [employer] ()   Surgeries: 12 lap macrina at [facility] (Dr. Wolf) after admit with pancreatitis, hepatitis, etc.   Hospitalizations: none   No smoke (quit 25 years ago after 2 ppd x 25 years)   No EtOH (no h/o problems)   Mom -  88 (CVA)   Dad -  77 (heart valve)   8 siblings - (3 ) - accidental broken neck after diving into a rock, lung CA (asbestos exposure), and cerebral hemorrhage   Pt is not interested in any vaccines-he had a reaction to the flu vaccine once and is not interested in any shots at all.-Griselda Segura, CMA 09   Going to [place], [place]  - Mar 2011   PATIENT NEEDS PNEUMOVAX VACCINATION     History reviewed. No pertinent surgical history.     History reviewed. No pertinent family history.     History reviewed. No pertinent past medical history.     Social History   Substance Use Topics     Smoking status: Former Smoker     Smokeless tobacco: Never Used     Alcohol use No        Current Outpatient Prescriptions   Medication Sig Dispense Refill     aspirin 81 MG EC tablet Take 1 tablet (81 mg total) by mouth daily. 150 tablet 2     lisinopril-hydrochlorothiazide (PRINZIDE,ZESTORETIC) 20-25 mg per tablet TAKE ONE TABLET BY MOUTH ONCE DAILY 90 tablet 2     metoprolol succinate (TOPROL-XL) 50 MG 24 hr tablet TAKE ONE TABLET BY MOUTH ONCE DAILY 90 tablet 2     MULTIVITS-MIN/HRB  (URINOZINC PROSTATE FORMULA ORAL) Take by mouth.       triamcinolone (KENALOG) 0.1 % cream apply topically to affected areas twice daily as needed 30 g 0     metroNIDAZOLE (METROGEL) 1 % gel apply topically to affected areas twice daily 60 g 2     No " current facility-administered medications for this visit.           Objective:    Vitals:    10/25/18 0752 10/25/18 0819   BP: 138/80 126/72   Pulse: 86    SpO2: 93%    Weight: (!) 223 lb (101.2 kg)       Body mass index is 28.63 kg/(m^2).    Alert.  No apparent distress.  Chest clear.  Cardiac exam regular without cardiac ectopy or murmur.  Extremities warm and dry.  Acne rosacea present with telangiectasias involving cheeks and nose as well as some mild rhinophyma findings.      This note has been dictated using voice recognition software and as a result may contain minor grammatical errors and unintended word substitutions.

## 2021-06-21 NOTE — PROGRESS NOTES
"Assessment/Plan:    1. Epistaxis  Epistaxis associated with left nasal septal anterior source.  Silver nitrate cautery completed, tolerated well.  Patient continues aspirin 81 mg daily.  Will consider holding off on medicine times 2 weeks if recurrent epistaxis noted.  Humidifier, petroleum jelly, nasal saline sprays etc. reviewed.    2. Essential hypertension  Hypertension fair control blood pressure 142/68 on recheck and continues lisinopril hydrochlorothiazide 20/25 using 1 tab daily and metoprolol succinate 50 mg daily.    3. Acne rosacea  Medication including MetroGel 0.75% over $110 for a month supply.  Patient will try hydrocortisone 1% twice daily sparingly to affected area of right nasal bridge for question of seborrheic dermatitis.  Underlying acne rosacea mild.    4. Hypercholesterolemia  Diet-controlled cholesterol elevation, mild.        Subjective:    Reddy Allen is seen today for evaluation of left nasal bleeding.  2 episodes in the last week.  Sunday as well as Tuesday lasting a couple minutes then getting better with pressure.  No bleeding this morning.  No right-sided concerns.  No trauma.  Does have hypertension.  Typically blood pressures under 140 systolic.  Metoprolol succinate 50 mg daily and lisinopril hydrochlorothiazide 20/25 using 1 tab daily.  Did have episode of paroxysmal atrial fibrillation January 2012 without recurrence.  Continues aspirin 81 mg daily.  Cholesterol elevation, mild.  Declines flu shot etc.  No lightheadedness.  No fatigue.  Otherwise feels well.  Comprehensive review of systems as above otherwise all negative.    Remarried \"[place]\" x 25 years   1 son -   1 daughter -   5 grandchildren  1 great-grandchild   Work: Retired x 9 years from [employer] ()   Surgeries: 1/17/12 ayla schrader at [facility] (Dr. Wolf) after admit with pancreatitis, hepatitis, etc. (postop a. fib described without reoccurrence)  Hospitalizations: none   No smoke (quit 25 years ago " after 2 ppd x 25 years)   No EtOH (no h/o problems)   Mom -  88 (CVA)   Dad -  77 (heart valve)   8 siblings - (3 ) - accidental broken neck after diving into a rock, lung CA (asbestos exposure), and cerebral hemorrhage   Pt is not interested in any vaccines-he had a reaction to the flu vaccine once and is not interested in any shots at all.-Griselda Segura, CMA 09   Going to [place], [place]  - Mar 2011   PATIENT NEEDS PNEUMOVAX VACCINATION    History reviewed. No pertinent surgical history.     History reviewed. No pertinent family history.     History reviewed. No pertinent past medical history.     Social History   Substance Use Topics     Smoking status: Former Smoker     Smokeless tobacco: Never Used     Alcohol use No        Current Outpatient Prescriptions   Medication Sig Dispense Refill     aspirin 81 MG EC tablet Take 1 tablet (81 mg total) by mouth daily. 150 tablet 2     lisinopril-hydrochlorothiazide (PRINZIDE,ZESTORETIC) 20-25 mg per tablet TAKE ONE TABLET BY MOUTH ONCE DAILY 90 tablet 2     metoprolol succinate (TOPROL-XL) 50 MG 24 hr tablet TAKE ONE TABLET BY MOUTH ONCE DAILY 90 tablet 2     MULTIVITS-MIN/HRB  (URINOZINC PROSTATE FORMULA ORAL) Take by mouth.       metroNIDAZOLE (METROGEL) 0.75 % gel apply topically to affected areas twice daily 45 g 5     triamcinolone (KENALOG) 0.1 % cream apply topically to affected areas twice daily as needed 30 g 0     No current facility-administered medications for this visit.           Objective:    Vitals:    10/31/18 1047   BP: 150/78   Pulse: 94   SpO2: 97%   Weight: 220 lb (99.8 kg)      Body mass index is 28.25 kg/(m^2).    Alert.  No apparent distress.  Left nasopharynx with noted nasal septal irritation at site of recent bleeding.  Silver nitrate chemical cautery performed tolerated well otherwise no further left or right sided findings that are atypical.  Oropharynx moist mucous membranes without postnasal  drainage.  Cardiac exam regular without cardiac ectopy or murmur.      This note has been dictated using voice recognition software and as a result may contain minor grammatical errors and unintended word substitutions.

## 2021-06-21 NOTE — LETTER
Letter by Héctor Nicholson MD at      Author: Héctor Nicholson MD Service: -- Author Type: --    Filed:  Encounter Date: 12/8/2020 Status: (Other)         Reddy Allen  [address] [place] [address]             December 8, 2020         Dear Mr. Allen,    Below are the results from your recent visit:    Resulted Orders   Lipid Cascade   Result Value Ref Range    Cholesterol 228 (H) <=199 mg/dL    Triglycerides 114 <=149 mg/dL    HDL Cholesterol 53 >=40 mg/dL    LDL Calculated 152 (H) <=129 mg/dL    Patient Fasting > 8hrs? Yes    Basic Metabolic Panel   Result Value Ref Range    Sodium 141 136 - 145 mmol/L    Potassium 4.4 3.5 - 5.0 mmol/L    Chloride 104 98 - 107 mmol/L    CO2 28 22 - 31 mmol/L    Anion Gap, Calculation 9 5 - 18 mmol/L    Glucose 106 70 - 125 mg/dL    Calcium 9.3 8.5 - 10.5 mg/dL    BUN 25 8 - 28 mg/dL    Creatinine 1.17 0.70 - 1.30 mg/dL    GFR MDRD Af Amer >60 >60 mL/min/1.73m2    GFR MDRD Non Af Amer 60 (L) >60 mL/min/1.73m2    Narrative    Fasting Glucose reference range is 70-99 mg/dL per  American Diabetes Association (ADA) guidelines.       Your cholesterol results remain elevated.  Ensure regular exercise, healthy diet, and weight loss modifications in order to further improve.  Weight goal < 220 pounds initially, < 210 pounds ideally.  We will plan to recheck your labs while fasting in the next 6-12 months to ensure desired improvement.      Your kidney function remains mildly reduced.  It appears stable.  Ensure adequate hydration.  We will continue to follow closely.      Please call with questions or contact us using [employer]    Sincerely,        Electronically signed by Héctor Nicholson MD

## 2021-06-22 NOTE — PROGRESS NOTES
Assessment:     1. Epistaxis  Ambulatory referral to ENT       Plan:     1. Epistaxis  No bleeding is noticed here at the time of examination; we did have a discussion about how to stop nosebleeds in the home setting but based on the examination and chronicity and recurrence of his left septal lesions I feel he needs probable definitive cauterization with appropriate topical anesthesia and visualization patient agrees  - Ambulatory referral to ENT      Subjective:   Patient has had a nosebleed for the last day or 2 which is intermittent since he made the appointment as is not uncommon nosebleed is stopped we have examined him here blood pressure is normal there is a scab over the left mid septum which I am reluctant to disturb with silver nitrate at this point in time.  We did discuss methodology of stopping nosebleeds in the home setting but based on chronicity recurrence of these nosebleeds I feel he may need definitive cauterization under magnification and appropriate anesthesia.  Patient was told to try to avoid sneezing straining but we realize this is difficult.  All questions that were asked were [id number] minute visit    Review of Systems: A complete 14 point review of systems was obtained and is negative or as stated in the history of present illness.    No past medical history on file.  No family history on file.  No past surgical history on file.  Social History     Tobacco Use     Smoking status: Former Smoker     Smokeless tobacco: Never Used   Substance Use Topics     Alcohol use: No     Drug use: No         Objective:   /70   Pulse 96   Wt 221 lb 9.6 oz (100.5 kg)   BMI 28.45 kg/m      General Appearance:  Normal  Head:  Normal  Ears: Normal  Eyes:  Normal  Nose: Using the external nares is a port I was able to visualize the septum which shows a scab on the midportion with no bleeding at present; patient has rather dry thin nasal mucosa; patient seems to have on and off chronic nosebleeds  could use definitive cauterization with local anesthetic to prevent recurrence of this problem.  Patient was advised how to stop nosebleeds with old-fashioned) liver type and gauze for 15-minute interval while sitting.  If this fails patient then needs to be seen.  Medications were reviewed today referral to ear nose and throat was placed today  Throat:  Normal  Neck:  Normal  Back:  Normal  Chest/Breast:Normal  Lungs:  Normal  Heart:  Normal  Abdomen:  Normal  Musculoskeletal:  Normal  Lymphatic:  Normal  Skin/Hair/Nails:  Normal  Neurologic:  Normal  Extremities:  Normal  Genitourinary: Normal  Pulses:  Normal           This note has been dictated using voice recognition software. Any grammatical or context distortions are unintentional and inherent to the the software.

## 2021-06-23 NOTE — PROGRESS NOTES
HPI: This patient is a 79yo M who presents to clinic for evaluation of epistaxis. States he has had a few bleeds from the left side over the last few weeks. They start and stop spontaneously, and none of them have required an ER visit or packing. Not currently using any nasal saline or other nasal preparations. Is putting vaseline up there and has been cauterized in the past.    Past medical history, surgical history, social history, family history, medications, and allergies have been reviewed with the patient and are documented above.    Review of Systems: a 10-system review was performed. Pertinent positives are noted in the HPI and on a separate scanned document in the chart.    PHYSICAL EXAMINATION:  GEN: no acute distress, normocephalic  EYES: extraocular movements are intact, pupils are equal and round. Sclera clear.   NOSE: anterior nares are patent. There are no masses or lesions. The septum is non-obstructing, but with a spur to the left with a dry exposed vessel on the left that was cauterized today.  NEURO: CN VII and XII symmetric. alert and oriented. No spontaneous nystagmus. Gait is normal.  PULM: breathing comfortably on room air, normal chest expansion with respiration  CARDS: no cyanosis or clubbing, normal carotid pulses    MEDICAL DECISION-MAKING: This patient is a 79yo M with epistaxis from the left anterior septum. The area in question was cauterized today in clinic without difficulty. Advised on nasal saline use. RTC PRN.

## 2021-06-23 NOTE — PROGRESS NOTES
HPI: This patient is a 77yo M who presents back to clinic for evaluation of epistaxis. States he has had a few bleeds from the left side over the last few weeks. They start and stop spontaneously, and none of them have required an ER visit or packing. He is using nasal saline since his last visit. He has bled 3 times since his last visit.     Past medical history, surgical history, social history, family history, medications, and allergies have been reviewed with the patient and are documented above.     Review of Systems: a 10-system review was performed. Pertinent positives are noted in the HPI and on a separate scanned document in the chart.     PHYSICAL EXAMINATION:  GEN: no acute distress, normocephalic  EYES: extraocular movements are intact, pupils are equal and round. Sclera clear.   NOSE: anterior nares are patent. There are no masses or lesions. The septum is non-obstructing, but with a spur to the left with a dry exposed vessel on the left, smaller than at previous visit, that was cauterized today.  NEURO: CN VII and XII symmetric. alert and oriented. No spontaneous nystagmus. Gait is normal.  PULM: breathing comfortably on room air, normal chest expansion with respiration  CARDS: no cyanosis or clubbing, normal carotid pulses     MEDICAL DECISION-MAKING: This patient is a 77yo M with continued epistaxis from the left anterior septum. The area in question was cauterized today in clinic without difficulty. Advised on continued nasal saline use. RTC PRN.

## 2021-06-29 NOTE — PROGRESS NOTES
"Progress Notes by Ravinder Napier PA-C at 4/23/2020 12:00 PM     Author: Ravinder Napier PA-C Service: -- Author Type: Physician Assistant    Filed: 4/23/2020  2:38 PM Encounter Date: 4/23/2020 Status: Signed    : Ravinder Napier PA-C (Physician Assistant)       Subjective:      Patient ID: Reddy Allen is a 79 y.o. male.    Chief Complaint:    HPI     Reddy Allen is a 79 y.o. male who presents today complaining of a rash which may have been caused to the medication that he was taking.  Patient was under the care of his urologist and was placed on a 30-day supply of Levaquin for his prostate.  She completed about half of that course and then developed a rash.  He contacted the urology office and they told him to discontinue the medication and take Benadryl.  Patient has discontinued medication has been off of it roughly 1 week, with last dose on April 16.  Patient has been using Benadryl, 1 tablet (25 mg) 3 times per day.  He has not suffered any anticholinergic effects and has not complained of urinary hesitancy decreased stream or urinary frequency with urinary retention.    However, patient has complained of a pruritic stinging red rash on the face and the neck and on the proximal portion of the anterior chest wall.  He states that this is made worse with change in temperature taking a shower.  He has been using a over-the-counter hydrocortisone \"spray\" with some relief of his itching burning and red rash.     Patient has denied any difficulty with deglutition and is handling his own secretions or difficulty with breathing no difficulty with eating.    Patient is also been using over-the-counter Coricidin for a runny nose.    No past medical history on file.    No past surgical history on file.    No family history on file.    Social History     Tobacco Use   ? Smoking status: Former Smoker   ? Smokeless tobacco: Never Used   Substance Use Topics   ? Alcohol use: No   ? Drug use: No       Review of " Systems  As above in HPI, otherwise balance of Review of Systems are negative.    Objective:     /75   Pulse 80   Temp 98.3  F (36.8  C) (Oral)   Resp 12   Wt 213 lb 12.8 oz (97 kg)   SpO2 96%   BMI 27.45 kg/m      Physical Exam  General: Patient is resting comfortably no acute distress is afebrile  HEENT: Head is normocephalic atraumatic   eyes are PERRL EOMI sclera anicteric   TMs are clear bilaterally  Throat is clear, oral pharyngeal airway is patent.  No swelling.  No cervical lymphadenopathy present  LUNGS: Clear to auscultation bilaterally  HEART: Regular rate and rhythm  Skin: With rash on the anterior chest wall and on the neck.  The face is not to be involved with the exception of where the facemask that he is wearing around the strings on the posterior aspect of the neck and on the cheeks as well as underneath the nose.     The visit lasted a total of 25 minutes that I spent face to face with the patient out of a 35 minute office visit. Over 50% of the time was spent counseling, coordinating care, reviewing pathophysiology and treatment options and educating the patient about the management plan.      Assessment:     Procedures    The primary encounter diagnosis was Allergic drug rash. A diagnosis of Seasonal allergic rhinitis, unspecified trigger was also pertinent to this visit.    Plan:     1. Allergic drug rash  cetirizine (ZYRTEC) 10 MG tablet   2. Seasonal allergic rhinitis, unspecified trigger  fluticasone propionate (FLONASE ALLERGY RELIEF) 50 mcg/actuation nasal spray       Had a conversation with patient stating that he should discontinue the Benadryl and the Coricidin as this is most likely going to cause him too much trouble with too much antihistamine and anticholinergic effects.  Patient does not have problems with urinary retention at this time.  The urology did not recommend he have an alternative antibiotic for his prostate concern.  I did redirect him back to the urology  office to have recommendation if he should have a new antibiotic.  Otherwise, discontinuation of the Levaquin as he already has done.  Note of his allergy was made in the chart.  We will supplant the Benadryl and the Coricidin with Zyrtec.  Risks and benefits of the medication were gone over.  Patient will use of fluticasone nasal spray to help with the itchy watery eyes runny nose and congestion.  Advised the patient that the allergic rash should be clearing each day that goes by.  He should avoid extremes of temperature with both air and water as well as vigorous exercise to decrease the reactivity of the rash also avoidance of pressure.  Indication for return was gone over questions were answered to patient's satisfaction before discharge.    Patient Instructions   Discontinue use of the Levaquin.  You have contacted your urologist office and they did not recommend an alternative therapy for your original treatment.  Discontinue use of the Benadryl and the Coricidin as you are most likely getting too much antihistamine.  Instead, you will use Zyrtec (cetirizine) 10 mg once daily for the antihistamine effect.  Discontinue if you get dry mouth dizziness fatigue or urinary retention as these all may be bad signs or complications of taking the Zyrtec.  Use of the nasal steroid to help with runny nose and congestion.  Use the crossover technique with using the right hand to the left nostril and left and 2 right nostril to avoid bloody noses.  You may use your mask but be aware that temperature and pressure may make your itching and rash worse.  Avoid extremes of temperature hot or cold air or vigorous exercise.  Follow-up if you are not getting good resolution of the rash or if new symptoms or concerns arise.      Patient Education     Reaction to Medicine (Other Type)  You are having a reaction to a medicine you have taken. This may not be the same as an allergic reaction. It is an undesired, unfavorable reaction, or  a side effect of a medicine. This can cause a variety of symptoms, including:    Dizziness or headache    Rash    Flushing or hot sensation    Nausea, vomiting, or stomach pain    Diarrhea or constipation    Trouble breathing    High or low blood pressure  A reaction can be an upset stomach from something like aspirin or ibuprofen, feeling faint after taking a blood pressure medicine, feeling anxious, and many other things. Symptoms of a medicine reaction can range from very mild to very severe.  In most cases, the reaction goes away within 1 to 12 hours. But it will probably occur again if you take this same medicine. Your healthcare provider will advise you whether to change how much, when, or how often you take this medicine. He or she may also advise you to discontinue this medicine or switch to another one.   Home care    Another medicine may be recommended to reduce your symptoms until the medicines effect wears off. Follow your healthcare providers advice.    When the medicines effect has worn off, there should be no further problem as long as you don't take the same medicine again.     Ask your healthcare provider if you should also avoid similar medicines. Write down the information so you will remember it.    Make certain the medicine reaction is documented in your medical record.  Follow-up care  Follow up with your healthcare provider, or as advised if your symptoms are not better within 24 hours.  When to seek medical advice  Call your healthcare provider right away if any of these occur.    New symptoms that concern you    Worsening of your current symptoms, including rash or facial swelling    Symptoms that are not relieved by the treatment advised    Fever of 100.4 F (38 C) or higher, or as advised by your healthcare provider  Call 911  Call 911 if any of these occur:    Trouble breathing or swallowing, or wheezing    Hoarse voice or trouble speaking    Confusion    Extreme drowsiness or trouble  awakening    Fainting or loss of consciousness    Rapid heart rate or slow heart rate    Very low or very high blood pressure    Vomiting blood, or large amounts of blood in stool    Seizure  Date Last Reviewed: 4/1/2017 2000-2017 [employer]. [address] [place] [address], [place] [address]. All rights reserved. This information is not intended as a substitute for professional medical care. Always follow your healthcare professional's instructions.           Patient Education     Medicine Reaction: Allergic  You are having an allergic reaction to a medicine you have taken. This causes an itchy rash and sometimes swelling of various parts of the body. It may also cause trouble swallowing or breathing. The rash may take a few hours or up to 2 weeks to go away. In the future, remember to tell your healthcare provider about your allergy to this medicine so that medicines of this type won't be used again.  Any medicine can cause an allergic reaction. But the most allergic reactions are caused by:    Penicillin and related medicines    Aspirin    Ibuprofen    Seizure medicines  Vaccines may also trigger allergies. People whose parents or siblings have allergies are at a higher risk of developing a medicine allergy. Allergy testing may sometimes be needed to figure out the cause.  Symptoms may occur within minutes, hours, or even weeks after exposure to the medicine. It can be a mild or severe reaction, or potentially life threatening. Most of us think of allergic reactions when we have a rash or itchy skin. Symptoms can include:    Rash, hives, redness, welts, blisters    Itching, burning, stinging, pain    Dry, flaky, cracking, scaly skin    Belly (abdominal) cramps or nausea or stomach pain    Fever.  Sometimes fever is the only symptom of a drug reaction. In older adults, the risk of fever increases with the number of medicines the person takes.  More severe symptoms include:    Swelling of the face or lips,  or drooling    Trouble swallowing, feeling like your throat is closing    Trouble breathing, wheezing    Hoarse voice or trouble speaking    Severe nausea or vomiting or diarrhea      Feeling faint or lightheaded, rapid heart rate  Home care    The goal of treatment is to help relieve the symptoms, and get you feeling better. Mild to medium medicine reactions usually respond quickly to antihistamines and steroids. The rash will usually fade over several days. But it can sometimes last a couple of weeks. Over the next couple of days, there may be times when it is gets a little worse, and then better again. Here are some things to do:    Throw the medicine away and dont take it again. The next reaction may be much worse.    Add this medicine reaction to your electronic medical record.    When getting a new medicine, always tell the healthcare provider that you are allergic to this medicine. Make certain the provider writes it down in your medical record.    Avoid tight clothing and anything that heats up your skin (hot showers or baths, direct sunlight). Heat will make itching worse.    An ice pack will relieve local areas of intense itching and redness. To make an ice pack, put ice cubes in a plastic bag that seals at the top. Wrap the bag in a clean, thin towel or cloth. Dont put ice directly on the skin.    To help prevent an infection, don't scratch the affected area. Scratching may worsen the reaction. It can damage your skin and lead to an infection. Always check the affected site for signs of an infection.    Your provider may give you a prescription antihistamine.    If you are not given a prescription antihistamine, oral diphenhydramine is an over-the-counter antihistamine available at pharmacies and grocery stores. This may be used to reduce itching if large areas of the skin are involved. This antihistamine may make you sleepy, so be careful using it in the daytime or when going to school, working, or  driving. Note: Dont use diphenhydramine if you have glaucoma or if you are a man with trouble urinating due to an enlarged prostate. There are other antihistamines that cause less drowsiness and are a good choice for daytime use. Ask your pharmacist for suggestions.    Don't use diphenhydramine cream on your skin. It can cause a further skin reaction for some people.    Contact your healthcare provider and ask what can be used on the affected area to help decrease the itching.  Follow-up care  Follow up with your healthcare provider, or as advised if your symptoms do not continue to improve or they get worse.  Call 911  Call 911 if any of these occur:    Shortness of breath    Cool, moist, pale skin    Swelling in the face, eyelids, mouth, tongue, or lips    Drooling    Trouble breathing or swallowing, wheezing    New or worsening swelling in the mouth, throat, or tongue    Hoarse voice or trouble speaking     Fainting or loss of consciousness    Rapid heart rate    Feeling of dizziness or weakness or a sudden drop in blood pressure    Feeling of doom    Feeling lightheaded    Severe nausea, vomiting, or diarrhea  When to seek medical advice  Call your healthcare provider right away if any of these occur:    Continuing or recurring symptoms    Nausea, abdominal cramps or stomach pain    Spreading areas of itching, redness or swelling    Signs of infection:  ? Spreading redness  ? Increased pain or swelling  ? Fever of 100.4 F (38 C) or above lasting for 24 to 48 hours, or as directed by your provider  ? Fluid or colored drainage from the affected area  Date Last Reviewed: 3/1/2017    8757-7278 [employer]. [address] [place], [place] [id number]. All rights reserved. This information is not intended as a substitute for professional medical care. Always follow your healthcare professional's instructions.           Patient Education     Allergy Medicines: Over-the-Counter    You can buy many allergy  medicines over the counter (OTC) without a prescription. It's important to remember:    You may use the OTC medicine alone or with prescription medicine.    Always use all medicines exactly as instructed.    If you take prescription medicines, check with your pharmacist about possible medicine interactions before taking OTC medicines.    If you have any questions about your allergy medicine, ask your healthcare provider or your pharmacist.  There are many OTC allergy medicines including antihistamines, decongestants, and steroid nasal sprays. And there are combination products. For example, a pill with both an antihistamine and a decongestant. You may also be advised to take more than one medicine. For example, a steroid nasal spray and an antihistamine pill.  Antihistamines  Antihistamines block the response to histamine. Histamine is the substance released by your body that causes many allergy symptoms. Antihistamines help reduce sneezing, itching, and runny noses.   These medicines:    Are available over the counter as pills or liquids    May cause you to be sleepy    Should be taken as directed    Don't work well at easing nasal congestion  Decongestants  Decongestants reduce swelling of the nasal passages. They ease pressure and pain in your sinuses.  Things to know about these medicines:    Decongestants are available as pills, liquids, and nasal sprays.    Decongestant nasal spray should not be used for more than a few days. Overuse can actually make your symptoms worse by causing a rebound effect. This can make some people feel that they have a cold that just won't go away.    Decongestant pills are now kept behind the counter. You don't need a prescription for these medicines. But you must ask the pharmacist for them.    Decongestant pills should not be used by people with high blood pressure. If you take high blood pressure medicines, ask your healthcare provider how to treat congestion.  Steroid nasal  sprays  Steroid nasal sprays are used for nasal allergy symptoms. They help to reduce nasal congestion and swelling, runny noses, and sneezing.  Steroid nasal sprays:    Shouldn't be used without your healthcare provider's advice    Can cause side effects, such as nasal bleeding    Should be sprayed into the nose correctly    May take up to 2 weeks to be fully effective. Daily use of these medicines often leads to the best results. Follow the instructions for how long to use the medicine.  Always read and follow the instructions on the label. If you have any questions about these medicines, ask your healthcare provider or pharmacist.  Date Last Reviewed: 9/1/2016 2000-2019 [employer]. [address] [place] [address], [place] [address]. All rights reserved. This information is not intended as a substitute for professional medical care. Always follow your healthcare professional's instructions.

## 2021-06-29 NOTE — PROGRESS NOTES
Progress Notes by Miroslava Perrin CNP at 4/29/2020 10:00 AM     Author: Miroslava Perrin CNP Service: -- Author Type: Nurse Practitioner    Filed: 4/29/2020 10:19 AM Encounter Date: 4/29/2020 Status: Signed    : Miroslava Perrin CNP (Nurse Practitioner)       Chief Complaint   Patient presents with   ? Rash     x2wks, spreading to arms and wrist, allergic reaction to antibiotic        ASSESSMENT & PLAN:   Diagnoses and all orders for this visit:    Dermatitis        MDM:  Patient describing intermittent flat, erythematous rash on flexural areas that comes and goes.  Denies any history of seasonal allergies or any other allergy symptoms.  Does sound like possible allergic dermatitis.  Current treatments seem to be helping.    Patient advised of this and that he should continue current treatments and report any changes in rash to include rash that does not go away, rash that is peeling or blistering, severely painful rash, or any change in the appearance of his rash.     Splane that if this is related to outdoor allergies/allergic dermatitis that it may persist off and on for the whole season.  If this goes on longer than spring, he should consider seeing a dermatologist as he has never had it before.      Supportive care discussed.  See discharge instructions below for specific recommendations given.    At the end of the encounter, I discussed results, diagnosis, medications. Discussed red flags for immediate return to clinic/ER, as well as indications for follow up if no improvement. Patient and/or caregiver understood and agreed to plan. Patient was stable for discharge.    SUBJECTIVE    HPI:  HPI  Reddy Allen presents to the walk-in clinic with   Chief Complaint   Patient presents with   ? Rash     x2wks, spreading to arms and wrist, allergic reaction to antibiotic      See previous visits related to this rash.    Patient mostly concerned about intermittent flat rash that he describes as fiery red and  intermittent on both elbows.  Has a tingling, burning feeling on his neck that comes and goes.    At the time of my evaluation he is asymptomatic.  Is currently using Zyrtec, moisturizer, spray hydrocortisone as needed.    Denies any rash that has includes skin peeling, blistering, or papules.    Says getting in the shower will make it burn more.    Symptoms started: Little over 2 weeks ago associated with taking Levaquin.  Levaquin was stopped at the time.    See ROS for additional symptoms and/or pertinent negatives.       History obtained from the patient.    No past medical history on file.    Active Ambulatory (Non-Hospital) Problems    Diagnosis   ? Hypercholesterolemia   ? Paroxysmal Atrial Fibrillation   ? Rosacea   ? Trigger Finger (Acquired)   ? Hypertension   ? Lichen Planus       No family history on file.    Social History     Tobacco Use   ? Smoking status: Former Smoker   ? Smokeless tobacco: Never Used   Substance Use Topics   ? Alcohol use: No       Review of Systems   HENT: Negative for congestion, postnasal drip and rhinorrhea.    Respiratory: Negative for cough.    Skin: Positive for rash. Negative for wound.   All other systems reviewed and are negative.      OBJECTIVE    Vitals:    04/29/20 1000   BP: 148/80   Patient Site: Right Arm   Patient Position: Sitting   Cuff Size: Adult Regular   Pulse: 88   Resp: 16   Temp: 98.2  F (36.8  C)   TempSrc: Oral   SpO2: 96%       Physical Exam  Constitutional:       General: He is not in acute distress.     Appearance: He is well-developed.   HENT:      Right Ear: External ear normal.      Left Ear: External ear normal.   Eyes:      General:         Right eye: No discharge.         Left eye: No discharge.      Conjunctiva/sclera: Conjunctivae normal.   Pulmonary:      Effort: Pulmonary effort is normal.   Musculoskeletal: Normal range of motion.   Skin:     General: Skin is warm and dry.      Capillary Refill: Capillary refill takes less than 2 seconds.       Comments: No areas of obvious abnormal skin rash noted in indicated areas where rash was present previously.   Neurological:      Mental Status: He is alert and oriented to person, place, and time.   Psychiatric:         Mood and Affect: Mood normal.         Behavior: Behavior normal.         Thought Content: Thought content normal.         Judgment: Judgment normal.         Chest described as very itchy-no rash seen.    Labs:  No results found for this or any previous visit (from the past 240 hour(s)).      Radiology:    No results found.    PATIENT INSTRUCTIONS:   There are no Patient Instructions on file for this visit.

## 2021-08-22 ENCOUNTER — HEALTH MAINTENANCE LETTER (OUTPATIENT)
Age: 81
End: 2021-08-22

## 2021-10-17 ENCOUNTER — HEALTH MAINTENANCE LETTER (OUTPATIENT)
Age: 81
End: 2021-10-17

## 2021-12-08 ENCOUNTER — OFFICE VISIT (OUTPATIENT)
Dept: FAMILY MEDICINE | Facility: CLINIC | Age: 81
End: 2021-12-08
Payer: COMMERCIAL

## 2021-12-08 VITALS
WEIGHT: 211 LBS | BODY MASS INDEX: 27.09 KG/M2 | SYSTOLIC BLOOD PRESSURE: 132 MMHG | OXYGEN SATURATION: 94 % | HEART RATE: 87 BPM | DIASTOLIC BLOOD PRESSURE: 72 MMHG

## 2021-12-08 DIAGNOSIS — I10 ESSENTIAL HYPERTENSION: Primary | ICD-10-CM

## 2021-12-08 DIAGNOSIS — L30.9 DERMATITIS: ICD-10-CM

## 2021-12-08 DIAGNOSIS — J30.2 SEASONAL ALLERGIC RHINITIS, UNSPECIFIED TRIGGER: ICD-10-CM

## 2021-12-08 LAB
ANION GAP SERPL CALCULATED.3IONS-SCNC: 13 MMOL/L (ref 5–18)
BUN SERPL-MCNC: 22 MG/DL (ref 8–28)
CALCIUM SERPL-MCNC: 9.7 MG/DL (ref 8.5–10.5)
CHLORIDE BLD-SCNC: 103 MMOL/L (ref 98–107)
CO2 SERPL-SCNC: 24 MMOL/L (ref 22–31)
CREAT SERPL-MCNC: 1.17 MG/DL (ref 0.7–1.3)
GFR SERPL CREATININE-BSD FRML MDRD: 59 ML/MIN/1.73M2
GLUCOSE BLD-MCNC: 93 MG/DL (ref 70–125)
POTASSIUM BLD-SCNC: 4.4 MMOL/L (ref 3.5–5)
SODIUM SERPL-SCNC: 140 MMOL/L (ref 136–145)

## 2021-12-08 PROCEDURE — 80048 BASIC METABOLIC PNL TOTAL CA: CPT | Performed by: FAMILY MEDICINE

## 2021-12-08 PROCEDURE — 36415 COLL VENOUS BLD VENIPUNCTURE: CPT | Performed by: FAMILY MEDICINE

## 2021-12-08 PROCEDURE — 99214 OFFICE O/P EST MOD 30 MIN: CPT | Performed by: FAMILY MEDICINE

## 2021-12-08 RX ORDER — MONTELUKAST SODIUM 10 MG/1
10 TABLET ORAL AT BEDTIME
Qty: 30 TABLET | Refills: 3 | Status: SHIPPED | OUTPATIENT
Start: 2021-12-08

## 2021-12-08 RX ORDER — METOPROLOL SUCCINATE 50 MG/1
50 TABLET, EXTENDED RELEASE ORAL DAILY
Qty: 90 TABLET | Refills: 1 | Status: SHIPPED | OUTPATIENT
Start: 2021-12-08 | End: 2022-05-26

## 2021-12-08 RX ORDER — TRIAMCINOLONE ACETONIDE 1 MG/ML
LOTION TOPICAL 3 TIMES DAILY
Qty: 60 ML | Refills: 1 | Status: SHIPPED | OUTPATIENT
Start: 2021-12-08

## 2021-12-08 RX ORDER — LISINOPRIL AND HYDROCHLOROTHIAZIDE 20; 25 MG/1; MG/1
1 TABLET ORAL DAILY
Qty: 90 TABLET | Refills: 1 | Status: SHIPPED | OUTPATIENT
Start: 2021-12-08 | End: 2022-05-26

## 2021-12-08 NOTE — PROGRESS NOTES
Assessment/Plan:    Essential hypertension  Hypertension well-controlled blood pressure 132/72 on recheck and will continue lisinopril hydrochlorothiazide 20/25 using 1 tablet daily plus metoprolol succinate 50 mg daily.  Refills provided to local pharmacy.  Basic metabolic panel for medication monitoring.  Anticipate reassessment at annual wellness visit in 6 months.  - Basic metabolic panel  - lisinopril-hydrochlorothiazide (ZESTORETIC) 20-25 MG tablet  Dispense: 90 tablet; Refill: 1  - metoprolol succinate ER (TOPROL-XL) 50 MG 24 hr tablet  Dispense: 90 tablet; Refill: 1    Dermatitis  Dermatitis noted with component of xerosis likely.  Triamcinolone 0.1% external lotion topically twice daily to affected areas sparingly.  - triamcinolone (KENALOG) 0.1 % external lotion  Dispense: 60 mL; Refill: 1    Seasonal allergic rhinitis, unspecified trigger  Trial of Singulair 10 mg every afternoon.  Adult allergy referral was placed per patient request as well having failed treatments including fluticasone nasal spray, Zyrtec 10 mg daily, etc.  Describes typical grass pollen allergy however persistent symptoms despite snow-covered ground.  - Adult Allergy/Asthma Referral  - montelukast (SINGULAIR) 10 MG tablet  Dispense: 30 tablet; Refill: 3          Subjective:    Reddy ANDREINA Allen is seen today for follow-up assessment.  Hypertension.  Lisinopril hydrochlorothiazide 20/25 daily for hypertension management as well as metoprolol succinate 50 mg daily.  No side effects.  Flonase and Zyrtec use for allergies including watery eyes, runny nose, itchy skin involving extensor aspect of knees and elbows as well as anterior chest described.  History of acne rosacea and lichen planus noted.  Patient would like to see allergist.  Denies recent illness.  Had completed primary series of Covid-19 Pfizer on Darlin 15, 2021 and will receive third shot booster after December 15, 2021.  Comprehensive review of systems as above otherwise all  "negative.    Remarried \"[place]\" x 25 years   1 son -   1 daughter -   5 grandchildren   1 great-grandchild   Work: Retired x 9 years from  ()   Surgeries: 12 ayla borregoy at [facility] (Dr. Wolf) after admit with pancreatitis, hepatitis, etc. (postop a. fib described without reoccurrence)   Hospitalizations: none   No smoke (quit 25 years ago after 2 ppd x 25 years)   No EtOH (no h/o problems)   Mom -  88 (CVA)   Dad -  77 (heart valve)   8 siblings - (3 ) - accidental broken neck after diving into a rock, lung CA (asbestos exposure), and cerebral hemorrhage    No past surgical history on file.     No family history on file.     No past medical history on file.     Social History     Tobacco Use     Smoking status: Former Smoker     Smokeless tobacco: Never Used   Substance Use Topics     Alcohol use: No     Drug use: No        Current Outpatient Medications   Medication Sig Dispense Refill     betamethasone, augmented, (DIPROLENE) 0.05 % ointment [BETAMETHASONE, AUGMENTED, (DIPROLENE) 0.05 % OINTMENT] apply sparingly topically to affected areas twice daily as needed 45 g 0     cetirizine (ZYRTEC) 10 MG tablet [CETIRIZINE (ZYRTEC) 10 MG TABLET] Take 1 tablet (10 mg total) by mouth daily. 30 tablet 2     fluticasone propionate (FLONASE ALLERGY RELIEF) 50 mcg/actuation nasal spray [FLUTICASONE PROPIONATE (FLONASE ALLERGY RELIEF) 50 MCG/ACTUATION NASAL SPRAY] 1 spray into each nostril daily. 16 g 0     lisinopril-hydrochlorothiazide (ZESTORETIC) 20-25 MG tablet Take 1 tablet by mouth daily 90 tablet 1     metoprolol succinate ER (TOPROL-XL) 50 MG 24 hr tablet Take 1 tablet (50 mg) by mouth daily 90 tablet 1     montelukast (SINGULAIR) 10 MG tablet Take 1 tablet (10 mg) by mouth At Bedtime 30 tablet 3     MULTIVITS-MIN/HRB  (URINOZINC PROSTATE FORMULA ORAL) [MULTIVITS-MIN/HRB  (URINOZINC PROSTATE FORMULA ORAL)] Take by mouth.       triamcinolone (KENALOG) " 0.1 % external lotion Apply topically 3 times daily Apply topically to affected areas twice daily as needed 60 mL 1     UNABLE TO FIND [UNABLE TO FIND] Med Name:temeric - twice a day - to help lower BS            Objective:    Vitals:    12/08/21 1302 12/08/21 1335   BP: (!) 140/70 132/72   Pulse: 87    SpO2: 94%    Weight: 95.7 kg (211 lb)       Body mass index is 27.09 kg/m .    Alert.  No apparent distress.  Chest clear.  Cardiac exam regular.  Slight conjunctival injection only.  TMs normal.  Nasopharynx without significant rhinorrhea currently.  Oropharynx without postnasal drainage.  Neck supple.  Mild erythema and evidence for xerosis anterior chest without significant hyperkeratotic lesions involving extensor aspect of knees or elbows.  No psoriatic plaques etc. identified.      This note has been dictated using voice recognition software and as a result may contain minor grammatical errors and unintended word substitutions.

## 2021-12-12 ENCOUNTER — HEALTH MAINTENANCE LETTER (OUTPATIENT)
Age: 81
End: 2021-12-12

## 2022-05-13 ENCOUNTER — OFFICE VISIT (OUTPATIENT)
Dept: FAMILY MEDICINE | Facility: CLINIC | Age: 82
End: 2022-05-13
Payer: COMMERCIAL

## 2022-05-13 VITALS
HEART RATE: 79 BPM | SYSTOLIC BLOOD PRESSURE: 152 MMHG | WEIGHT: 205 LBS | OXYGEN SATURATION: 97 % | BODY MASS INDEX: 26.32 KG/M2 | DIASTOLIC BLOOD PRESSURE: 78 MMHG

## 2022-05-13 DIAGNOSIS — R42 ORTHOSTATIC DIZZINESS: ICD-10-CM

## 2022-05-13 DIAGNOSIS — N18.31 CHRONIC KIDNEY DISEASE, STAGE 3A (H): ICD-10-CM

## 2022-05-13 DIAGNOSIS — I10 ESSENTIAL HYPERTENSION: Primary | ICD-10-CM

## 2022-05-13 LAB
ANION GAP SERPL CALCULATED.3IONS-SCNC: 13 MMOL/L (ref 5–18)
BUN SERPL-MCNC: 18 MG/DL (ref 8–28)
CALCIUM SERPL-MCNC: 9.7 MG/DL (ref 8.5–10.5)
CHLORIDE BLD-SCNC: 104 MMOL/L (ref 98–107)
CO2 SERPL-SCNC: 26 MMOL/L (ref 22–31)
CREAT SERPL-MCNC: 1.09 MG/DL (ref 0.7–1.3)
ERYTHROCYTE [DISTWIDTH] IN BLOOD BY AUTOMATED COUNT: 12.4 % (ref 10–15)
GFR SERPL CREATININE-BSD FRML MDRD: 69 ML/MIN/1.73M2
GLUCOSE BLD-MCNC: 102 MG/DL (ref 70–125)
HCT VFR BLD AUTO: 45 % (ref 40–53)
HGB BLD-MCNC: 15.3 G/DL (ref 13.3–17.7)
MCH RBC QN AUTO: 30.8 PG (ref 26.5–33)
MCHC RBC AUTO-ENTMCNC: 34 G/DL (ref 31.5–36.5)
MCV RBC AUTO: 91 FL (ref 78–100)
PLATELET # BLD AUTO: 246 10E3/UL (ref 150–450)
POTASSIUM BLD-SCNC: 4.1 MMOL/L (ref 3.5–5)
RBC # BLD AUTO: 4.97 10E6/UL (ref 4.4–5.9)
SODIUM SERPL-SCNC: 143 MMOL/L (ref 136–145)
WBC # BLD AUTO: 6.2 10E3/UL (ref 4–11)

## 2022-05-13 PROCEDURE — 80048 BASIC METABOLIC PNL TOTAL CA: CPT | Performed by: FAMILY MEDICINE

## 2022-05-13 PROCEDURE — 85027 COMPLETE CBC AUTOMATED: CPT | Performed by: FAMILY MEDICINE

## 2022-05-13 PROCEDURE — 36415 COLL VENOUS BLD VENIPUNCTURE: CPT | Performed by: FAMILY MEDICINE

## 2022-05-13 PROCEDURE — 99214 OFFICE O/P EST MOD 30 MIN: CPT | Performed by: FAMILY MEDICINE

## 2022-05-13 NOTE — PROGRESS NOTES
"Assessment/Plan:    Essential hypertension  Essential hypertension with noted blood pressure 152/78 on recheck.  Continues lisinopril hydrochlorothiazide 20/25 using 1 tab daily plus metoprolol succinate 50 mg daily.  Will monitor currently for goal less than 130/80 ideally.  Med monitoring completed today.  - Basic metabolic panel  - Basic metabolic panel    Orthostatic dizziness  Orthostatic dizziness described and will monitor closely while continuing lisinopril hydrochlorothiazide 20/25 daily metoprolol succinate 50 mg daily.  Pulse rate 79 without bradycardia on exam.  Blood pressure 152/78 on recheck.  Slow position change with anticipated benefit as well as ensuring adequate hydration.  Ensure no evidence for associated anemia.  - Basic metabolic panel  - CBC with platelets  - Basic metabolic panel  - CBC with platelets    Chronic kidney disease, stage 3a (H)  History of CKD stage IIIa.  Prior creatinine 1.17 GFR 59 will ensure stable renal function.  - Basic metabolic panel  - Basic metabolic panel        Subjective:    Reddy Allen is seen today for evaluation.  Underlying hypertension.  Described episode of dizziness after standing up from a squatting position.  Is on lisinopril hydrochlorothiazide 20/25 daily plus metoprolol succinate 50 mg daily.  Also notices occasional off-balance sensation after getting up and starting to walk but this is short-lived according to patient.  CKD stage IIIa historically.  Denies history of anemia.  In general is feeling well without other focal neurologic concerns.  No headache etc.  Denies recent illness.  Comprehensive review of systems as above otherwise all negative.    Remarried \"[place]\" x 25 years   1 son -   1 daughter -   5 grandchildren   1 great-grandchild   Work: Retired x 9 years from  ()   Surgeries: 1/17/12 ayla schrader at [facility] (Dr. Wolf) after admit with pancreatitis, hepatitis, etc. (postop a. fib described without " reoccurrence)   Hospitalizations: none   No smoke (quit 25 years ago after 2 ppd x 25 years)   No EtOH (no h/o problems)   Mom -  88 (CVA)   Dad -  77 (heart valve)   8 siblings - (3 ) - accidental broken neck after diving into a rock, lung CA (asbestos exposure), and cerebral hemorrhage    No past surgical history on file.     No family history on file.     No past medical history on file.     Social History     Tobacco Use     Smoking status: Former Smoker     Smokeless tobacco: Never Used   Substance Use Topics     Alcohol use: No     Drug use: No        Current Outpatient Medications   Medication Sig Dispense Refill     betamethasone, augmented, (DIPROLENE) 0.05 % ointment [BETAMETHASONE, AUGMENTED, (DIPROLENE) 0.05 % OINTMENT] apply sparingly topically to affected areas twice daily as needed 45 g 0     cetirizine (ZYRTEC) 10 MG tablet [CETIRIZINE (ZYRTEC) 10 MG TABLET] Take 1 tablet (10 mg total) by mouth daily. 30 tablet 2     fluticasone propionate (FLONASE ALLERGY RELIEF) 50 mcg/actuation nasal spray [FLUTICASONE PROPIONATE (FLONASE ALLERGY RELIEF) 50 MCG/ACTUATION NASAL SPRAY] 1 spray into each nostril daily. 16 g 0     lisinopril-hydrochlorothiazide (ZESTORETIC) 20-25 MG tablet Take 1 tablet by mouth daily 90 tablet 1     metoprolol succinate ER (TOPROL-XL) 50 MG 24 hr tablet Take 1 tablet (50 mg) by mouth daily 90 tablet 1     montelukast (SINGULAIR) 10 MG tablet Take 1 tablet (10 mg) by mouth At Bedtime 30 tablet 3     MULTIVITS-MIN/HRB  (URINOZINC PROSTATE FORMULA ORAL) [MULTIVITS-MIN/HRB  (URINOZINC PROSTATE FORMULA ORAL)] Take by mouth.       triamcinolone (KENALOG) 0.1 % external lotion Apply topically 3 times daily Apply topically to affected areas twice daily as needed 60 mL 1     UNABLE TO FIND [UNABLE TO FIND] Med Name:temeric - twice a day - to help lower BS            Objective:    Vitals:    22 1259 22 1353   BP: (!) 156/80 (!) 152/78   Pulse: 79     SpO2: 97%    Weight: 93 kg (205 lb)       Body mass index is 26.32 kg/m .    Alert.  No apparent distress.  Blood pressure recheck 152/78.  Chest clear.  Cardiac exam regular.  Patient's gait currently appears to be without significant ataxia currently.  Nonfocal neurologic exam.      This note has been dictated using voice recognition software and as a result may contain minor grammatical errors and unintended word substitutions.

## 2022-10-01 ENCOUNTER — HEALTH MAINTENANCE LETTER (OUTPATIENT)
Age: 82
End: 2022-10-01

## 2023-01-17 ENCOUNTER — OFFICE VISIT (OUTPATIENT)
Dept: FAMILY MEDICINE | Facility: CLINIC | Age: 83
End: 2023-01-17
Payer: COMMERCIAL

## 2023-01-17 VITALS
WEIGHT: 198 LBS | SYSTOLIC BLOOD PRESSURE: 158 MMHG | DIASTOLIC BLOOD PRESSURE: 80 MMHG | OXYGEN SATURATION: 98 % | RESPIRATION RATE: 19 BRPM | BODY MASS INDEX: 25.42 KG/M2 | HEART RATE: 85 BPM

## 2023-01-17 DIAGNOSIS — I10 ESSENTIAL HYPERTENSION: Primary | ICD-10-CM

## 2023-01-17 DIAGNOSIS — N18.31 CHRONIC KIDNEY DISEASE, STAGE 3A (H): ICD-10-CM

## 2023-01-17 DIAGNOSIS — R73.01 IMPAIRED FASTING GLUCOSE: ICD-10-CM

## 2023-01-17 DIAGNOSIS — R35.0 URINARY FREQUENCY: ICD-10-CM

## 2023-01-17 DIAGNOSIS — K40.90 RIGHT INGUINAL HERNIA: ICD-10-CM

## 2023-01-17 DIAGNOSIS — L29.9 PRURITUS: ICD-10-CM

## 2023-01-17 DIAGNOSIS — E78.00 PURE HYPERCHOLESTEROLEMIA: ICD-10-CM

## 2023-01-17 DIAGNOSIS — I48.0 PAROXYSMAL ATRIAL FIBRILLATION (H): ICD-10-CM

## 2023-01-17 DIAGNOSIS — R53.83 FATIGUE, UNSPECIFIED TYPE: ICD-10-CM

## 2023-01-17 DIAGNOSIS — R42 ORTHOSTATIC DIZZINESS: ICD-10-CM

## 2023-01-17 LAB
ALBUMIN SERPL BCG-MCNC: 4.2 G/DL (ref 3.5–5.2)
ALBUMIN UR-MCNC: NEGATIVE MG/DL
ALP SERPL-CCNC: 58 U/L (ref 40–129)
ALT SERPL W P-5'-P-CCNC: 16 U/L (ref 10–50)
ANION GAP SERPL CALCULATED.3IONS-SCNC: 16 MMOL/L (ref 7–15)
APPEARANCE UR: CLEAR
AST SERPL W P-5'-P-CCNC: 23 U/L (ref 10–50)
BILIRUB SERPL-MCNC: 0.8 MG/DL
BILIRUB UR QL STRIP: NEGATIVE
BUN SERPL-MCNC: 23.2 MG/DL (ref 8–23)
CALCIUM SERPL-MCNC: 9.7 MG/DL (ref 8.8–10.2)
CHLORIDE SERPL-SCNC: 103 MMOL/L (ref 98–107)
CHOLEST SERPL-MCNC: 197 MG/DL
COLOR UR AUTO: YELLOW
CREAT SERPL-MCNC: 1.19 MG/DL (ref 0.67–1.17)
DEPRECATED HCO3 PLAS-SCNC: 22 MMOL/L (ref 22–29)
ERYTHROCYTE [DISTWIDTH] IN BLOOD BY AUTOMATED COUNT: 13.1 % (ref 10–15)
GFR SERPL CREATININE-BSD FRML MDRD: 62 ML/MIN/1.73M2
GLUCOSE SERPL-MCNC: 110 MG/DL (ref 70–99)
GLUCOSE UR STRIP-MCNC: NEGATIVE MG/DL
HBA1C MFR BLD: 5.4 % (ref 0–5.6)
HCT VFR BLD AUTO: 44.2 % (ref 40–53)
HDLC SERPL-MCNC: 60 MG/DL
HGB BLD-MCNC: 14.9 G/DL (ref 13.3–17.7)
HGB UR QL STRIP: NEGATIVE
KETONES UR STRIP-MCNC: NEGATIVE MG/DL
LDLC SERPL CALC-MCNC: 122 MG/DL
LEUKOCYTE ESTERASE UR QL STRIP: NEGATIVE
MCH RBC QN AUTO: 30.8 PG (ref 26.5–33)
MCHC RBC AUTO-ENTMCNC: 33.7 G/DL (ref 31.5–36.5)
MCV RBC AUTO: 92 FL (ref 78–100)
NITRATE UR QL: NEGATIVE
NONHDLC SERPL-MCNC: 137 MG/DL
PH UR STRIP: 5.5 [PH] (ref 5–8)
PLATELET # BLD AUTO: 254 10E3/UL (ref 150–450)
POTASSIUM SERPL-SCNC: 4.2 MMOL/L (ref 3.4–5.3)
PROT SERPL-MCNC: 7.3 G/DL (ref 6.4–8.3)
RBC # BLD AUTO: 4.83 10E6/UL (ref 4.4–5.9)
SODIUM SERPL-SCNC: 141 MMOL/L (ref 136–145)
SP GR UR STRIP: 1.02 (ref 1–1.03)
TRIGL SERPL-MCNC: 73 MG/DL
TSH SERPL DL<=0.005 MIU/L-ACNC: 0.73 UIU/ML (ref 0.3–4.2)
UROBILINOGEN UR STRIP-ACNC: 0.2 E.U./DL
WBC # BLD AUTO: 5.7 10E3/UL (ref 4–11)

## 2023-01-17 PROCEDURE — 80061 LIPID PANEL: CPT | Performed by: FAMILY MEDICINE

## 2023-01-17 PROCEDURE — 36415 COLL VENOUS BLD VENIPUNCTURE: CPT | Performed by: FAMILY MEDICINE

## 2023-01-17 PROCEDURE — 80053 COMPREHEN METABOLIC PANEL: CPT | Performed by: FAMILY MEDICINE

## 2023-01-17 PROCEDURE — 84443 ASSAY THYROID STIM HORMONE: CPT | Performed by: FAMILY MEDICINE

## 2023-01-17 PROCEDURE — 83036 HEMOGLOBIN GLYCOSYLATED A1C: CPT | Performed by: FAMILY MEDICINE

## 2023-01-17 PROCEDURE — 99214 OFFICE O/P EST MOD 30 MIN: CPT | Performed by: FAMILY MEDICINE

## 2023-01-17 PROCEDURE — 81003 URINALYSIS AUTO W/O SCOPE: CPT | Performed by: FAMILY MEDICINE

## 2023-01-17 PROCEDURE — 85027 COMPLETE CBC AUTOMATED: CPT | Performed by: FAMILY MEDICINE

## 2023-01-17 RX ORDER — TRIAMCINOLONE ACETONIDE 1 MG/ML
LOTION TOPICAL 2 TIMES DAILY
Qty: 60 ML | Refills: 1 | Status: SHIPPED | OUTPATIENT
Start: 2023-01-17

## 2023-01-17 ASSESSMENT — PAIN SCALES - GENERAL: PAINLEVEL: NO PAIN (0)

## 2023-01-17 NOTE — PROGRESS NOTES
Assessment/Plan:    Essential hypertension  Hypertension appears fair control blood pressure 142/72 on recheck and continues lisinopril hydrochlorothiazide 20/25 daily metoprolol succinate 50 mg daily.  Anticipate reassessment at annual wellness visit in just over 3 months.  - Comprehensive metabolic panel    Orthostatic dizziness  Prior concerns for orthostatic dizziness described as improved.  Continuing use of lisinopril hydrochlorothiazide 20/25 daily metoprolol succinate 50 mg daily with slower position change with benefit.    Chronic kidney disease, stage 3a (H)  Prior CKD stage IIIa.  Creatinine 1.17 and GFR 59 did improved to 1.09 and GFR 69.  Will ensure stable renal function with insurance of adequate hydration.  - Comprehensive metabolic panel    Fatigue, unspecified type  Fatigue likely multifactorial and did complete CBC, TSH and comprehensive metabolic panels today.  - TSH  - CBC with platelets    Urinary frequency  Urinary frequency concerns longstanding.  No dysuria or significant urgency.  Wants to ensure no glucosuria findings etc.  - UA reflex to Microscopic and Culture    Hypercholesterolemia  Hypercholesterolemia historically diet controlled currently.  - Lipid panel reflex to direct LDL Non-fasting    Pruritus  Pruritus described worse this winter.  Triamcinolone 0.1% lotion topically twice daily sparingly as needed.  - triamcinolone (KENALOG) 0.1 % external lotion  Dispense: 60 mL; Refill: 1    Impaired fasting glucose  Check A1c.  Had small amount of cream in his coffee this morning.  - Hemoglobin A1c    Paroxysmal atrial fibrillation (H)  Previously described paroxysmal atrial fibrillation without recurrence.    Right inguinal hernia  Small to moderate right inguinal hernia, reducible.        Subjective:    Reddy Allen is seen today for follow-up assessment.  Underlying hypertension.  Lisinopril hydrochlorothiazide 20/25 daily metoprolol succinate 50 mg daily.  Patient states that he has  "2 brothers and 1 sister with diabetes.  Patient's has had fatigue, balance is somewhat off, body itchiness and frequent urination and wants to ensure no evidence for diabetes.  Has had CKD stage IIIa historically..  Fasting glucose noted previously.  Paroxysmal atrial fibrillation noted during hospitalization postoperatively without recurrence.  Wondering about possible hernia on the right side as well after noticing while showering after coughing.  Does not bother him or limit him from lifting etc.  Comprehensive review of systems as above otherwise all negative.  Reaction to flu shot in the past and declines all other immunizations.    Remarried \"[place]\" x 25 years   1 son -   1 daughter -   5 grandchildren   1 great-grandchild   Work: Retired x 9 years from "[employer] ()   Surgeries: 12 lap macrina at [facility] (Dr. Wolf) after admit with pancreatitis, hepatitis, etc. (postop a. fib described without reoccurrence)   Hospitalizations: none   No smoke (quit 25 years ago after 2 ppd x 25 years)   No EtOH (no h/o problems)   Mom -  88 (CVA)   Dad -  77 (heart valve)   8 siblings - (3 ) - accidental broken neck after diving into a rock, lung CA (asbestos exposure), and cerebral hemorrhage    No past surgical history on file.     No family history on file.     No past medical history on file.     Social History     Tobacco Use     Smoking status: Former     Smokeless tobacco: Never   Substance Use Topics     Alcohol use: No     Drug use: No        Current Outpatient Medications   Medication Sig Dispense Refill     cetirizine (ZYRTEC) 10 MG tablet [CETIRIZINE (ZYRTEC) 10 MG TABLET] Take 1 tablet (10 mg total) by mouth daily. 30 tablet 2     fluticasone propionate (FLONASE ALLERGY RELIEF) 50 mcg/actuation nasal spray [FLUTICASONE PROPIONATE (FLONASE ALLERGY RELIEF) 50 MCG/ACTUATION NASAL SPRAY] 1 spray into each nostril daily. 16 g 0     lisinopril-hydrochlorothiazide " (ZESTORETIC) 20-25 MG tablet Take 1 tablet by mouth once daily 90 tablet 3     metoprolol succinate ER (TOPROL XL) 50 MG 24 hr tablet Take 1 tablet by mouth once daily 90 tablet 3     montelukast (SINGULAIR) 10 MG tablet Take 1 tablet (10 mg) by mouth At Bedtime 30 tablet 3     MULTIVITS-MIN/HRB  (URINOZINC PROSTATE FORMULA ORAL) [MULTIVITS-MIN/HRB  (URINOZINC PROSTATE FORMULA ORAL)] Take by mouth.       triamcinolone (KENALOG) 0.1 % external lotion Apply topically 2 times daily 60 mL 1     triamcinolone (KENALOG) 0.1 % external lotion Apply topically 3 times daily Apply topically to affected areas twice daily as needed 60 mL 1     UNABLE TO FIND [UNABLE TO FIND] Med Name:temeric - twice a day - to help lower BS       betamethasone, augmented, (DIPROLENE) 0.05 % ointment [BETAMETHASONE, AUGMENTED, (DIPROLENE) 0.05 % OINTMENT] apply sparingly topically to affected areas twice daily as needed (Patient not taking: Reported on 1/17/2023) 45 g 0          Objective:    Vitals:    01/17/23 0859   BP: (!) 158/80   Pulse: 85   Resp: 19   SpO2: 98%   Weight: 89.8 kg (198 lb)      Body mass index is 25.42 kg/m .    Alert.  Small to moderate right inguinal hernia, reducible.  Blood pressure 142/72 on recheck.  Cardiac exam regular without cardiac ectopy or murmur.  Extremities warm and dry.      This note has been dictated using voice recognition software and as a result may contain minor grammatical errors and unintended word substitutions.       Answers for HPI/ROS submitted by the patient on 1/17/2023  How many servings of fruits and vegetables do you eat daily?: 2-3  On average, how many sweetened beverages do you drink each day (Examples: soda, juice, sweet tea, etc.  Do NOT count diet or artificially sweetened beverages)?: 0  How many minutes a day do you exercise enough to make your heart beat faster?: 20 to 29  How many days a week do you exercise enough to make your heart beat faster?: 5  How many days per  week do you miss taking your medication?: 0  What is the reason for your visit today?: discuss symptoms  When did your symptoms begin?: More than a month  How would you describe these symptoms?: Mild  Are your symptoms:: Staying the same  Have you had these symptoms before?: No

## 2023-02-19 DIAGNOSIS — I10 ESSENTIAL HYPERTENSION: ICD-10-CM

## 2023-02-19 RX ORDER — LISINOPRIL AND HYDROCHLOROTHIAZIDE 20; 25 MG/1; MG/1
TABLET ORAL
Qty: 90 TABLET | Refills: 0 | OUTPATIENT
Start: 2023-02-19

## 2023-02-19 RX ORDER — METOPROLOL SUCCINATE 50 MG/1
TABLET, EXTENDED RELEASE ORAL
Qty: 90 TABLET | Refills: 0 | OUTPATIENT
Start: 2023-02-19

## 2023-05-12 DIAGNOSIS — I10 ESSENTIAL HYPERTENSION: ICD-10-CM

## 2023-05-12 NOTE — TELEPHONE ENCOUNTER
"Routing refill request to provider for review/approval because:  Labs out of range:  Cr, bp    Last Written Prescription Date:  5/26/22  Last Fill Quantity: 90,  # refills: 3   Last office visit provider:  1/17/23     Requested Prescriptions   Pending Prescriptions Disp Refills     lisinopril-hydrochlorothiazide (ZESTORETIC) 20-25 MG tablet [Pharmacy Med Name: Lisinopril-hydroCHLOROthiazide 20-25 MG Oral Tablet] 90 tablet 0     Sig: Take 1 tablet by mouth once daily       Diuretics (Including Combos) Protocol Failed - 5/12/2023  9:07 AM        Failed - Blood pressure under 140/90 in past 12 months     BP Readings from Last 3 Encounters:   01/17/23 (!) 158/80   05/13/22 (!) 152/78   12/08/21 132/72                 Failed - Normal serum creatinine on file in past 12 months     Recent Labs   Lab Test 01/17/23  0937   CR 1.19*              Passed - Recent (12 mo) or future (30 days) visit within the authorizing provider's specialty     Patient has had an office visit with the authorizing provider or a provider within the authorizing providers department within the previous 12 mos or has a future within next 30 days. See \"Patient Info\" tab in inbasket, or \"Choose Columns\" in Meds & Orders section of the refill encounter.              Passed - Medication is active on med list        Passed - Patient is age 18 or older        Passed - Normal serum potassium on file in past 12 months     Recent Labs   Lab Test 01/17/23  0937   POTASSIUM 4.2                    Passed - Normal serum sodium on file in past 12 months     Recent Labs   Lab Test 01/17/23  0937                ACE Inhibitors (Including Combos) Protocol Failed - 5/12/2023  9:07 AM        Failed - Blood pressure under 140/90 in past 12 months     BP Readings from Last 3 Encounters:   01/17/23 (!) 158/80   05/13/22 (!) 152/78   12/08/21 132/72                 Failed - Normal serum creatinine on file in past 12 months     Recent Labs   Lab Test 01/17/23  0937 " "  CR 1.19*       Ok to refill medication if creatinine is low          Passed - Recent (12 mo) or future (30 days) visit within the authorizing provider's specialty     Patient has had an office visit with the authorizing provider or a provider within the authorizing providers department within the previous 12 mos or has a future within next 30 days. See \"Patient Info\" tab in inbasket, or \"Choose Columns\" in Meds & Orders section of the refill encounter.              Passed - Medication is active on med list        Passed - Patient is age 18 or older        Passed - Normal serum potassium on file in past 12 months     Recent Labs   Lab Test 01/17/23  0937   POTASSIUM 4.2                metoprolol succinate ER (TOPROL XL) 50 MG 24 hr tablet [Pharmacy Med Name: Metoprolol Succinate ER 50 MG Oral Tablet Extended Release 24 Hour] 90 tablet 0     Sig: Take 1 tablet by mouth once daily       Beta-Blockers Protocol Failed - 5/12/2023  9:07 AM        Failed - Blood pressure under 140/90 in past 12 months     BP Readings from Last 3 Encounters:   01/17/23 (!) 158/80   05/13/22 (!) 152/78   12/08/21 132/72                 Passed - Patient is age 6 or older        Passed - Recent (12 mo) or future (30 days) visit within the authorizing provider's specialty     Patient has had an office visit with the authorizing provider or a provider within the authorizing providers department within the previous 12 mos or has a future within next 30 days. See \"Patient Info\" tab in inbasket, or \"Choose Columns\" in Meds & Orders section of the refill encounter.              Passed - Medication is active on med list             Salome Carroll RN 05/12/23 4:44 PM  " ambulatory

## 2023-05-13 RX ORDER — METOPROLOL SUCCINATE 50 MG/1
TABLET, EXTENDED RELEASE ORAL
Qty: 90 TABLET | Refills: 0 | Status: SHIPPED | OUTPATIENT
Start: 2023-05-13 | End: 2023-08-20

## 2023-05-13 RX ORDER — LISINOPRIL AND HYDROCHLOROTHIAZIDE 20; 25 MG/1; MG/1
TABLET ORAL
Qty: 90 TABLET | Refills: 0 | Status: SHIPPED | OUTPATIENT
Start: 2023-05-13 | End: 2023-08-20

## 2023-08-19 DIAGNOSIS — I10 ESSENTIAL HYPERTENSION: ICD-10-CM

## 2023-08-19 NOTE — TELEPHONE ENCOUNTER
"Routing refill request to provider for review/approval because:  Labs out of range:  Cr, BP    Last Written Prescription Date:  5/13/23  Last Fill Quantity: 90,  # refills: 0   Last office visit provider:  1/17/23     Requested Prescriptions   Pending Prescriptions Disp Refills    lisinopril-hydrochlorothiazide (ZESTORETIC) 20-25 MG tablet [Pharmacy Med Name: Lisinopril-hydroCHLOROthiazide 20-25 MG Oral Tablet] 90 tablet 0     Sig: Take 1 tablet by mouth once daily       Diuretics (Including Combos) Protocol Failed - 8/19/2023  9:52 AM        Failed - Blood pressure under 140/90 in past 12 months     BP Readings from Last 3 Encounters:   01/17/23 (!) 158/80   05/13/22 (!) 152/78   12/08/21 132/72                 Failed - Normal serum creatinine on file in past 12 months     Recent Labs   Lab Test 01/17/23  0937   CR 1.19*              Passed - Recent (12 mo) or future (30 days) visit within the authorizing provider's specialty     Patient has had an office visit with the authorizing provider or a provider within the authorizing providers department within the previous 12 mos or has a future within next 30 days. See \"Patient Info\" tab in inbasket, or \"Choose Columns\" in Meds & Orders section of the refill encounter.              Passed - Medication is active on med list        Passed - Patient is age 18 or older        Passed - Normal serum potassium on file in past 12 months     Recent Labs   Lab Test 01/17/23  0937   POTASSIUM 4.2                    Passed - Normal serum sodium on file in past 12 months     Recent Labs   Lab Test 01/17/23  0937                ACE Inhibitors (Including Combos) Protocol Failed - 8/19/2023  9:52 AM        Failed - Blood pressure under 140/90 in past 12 months     BP Readings from Last 3 Encounters:   01/17/23 (!) 158/80   05/13/22 (!) 152/78   12/08/21 132/72                 Failed - Normal serum creatinine on file in past 12 months     Recent Labs   Lab Test 01/17/23  0937   CR " "1.19*       Ok to refill medication if creatinine is low          Passed - Recent (12 mo) or future (30 days) visit within the authorizing provider's specialty     Patient has had an office visit with the authorizing provider or a provider within the authorizing providers department within the previous 12 mos or has a future within next 30 days. See \"Patient Info\" tab in inbasket, or \"Choose Columns\" in Meds & Orders section of the refill encounter.              Passed - Medication is active on med list        Passed - Patient is age 18 or older        Passed - Normal serum potassium on file in past 12 months     Recent Labs   Lab Test 01/17/23  0937   POTASSIUM 4.2               metoprolol succinate ER (TOPROL XL) 50 MG 24 hr tablet [Pharmacy Med Name: Metoprolol Succinate ER 50 MG Oral Tablet Extended Release 24 Hour] 90 tablet 0     Sig: Take 1 tablet by mouth once daily       Beta-Blockers Protocol Failed - 8/19/2023  9:52 AM        Failed - Blood pressure under 140/90 in past 12 months     BP Readings from Last 3 Encounters:   01/17/23 (!) 158/80   05/13/22 (!) 152/78   12/08/21 132/72                 Passed - Patient is age 6 or older        Passed - Recent (12 mo) or future (30 days) visit within the authorizing provider's specialty     Patient has had an office visit with the authorizing provider or a provider within the authorizing providers department within the previous 12 mos or has a future within next 30 days. See \"Patient Info\" tab in inbasket, or \"Choose Columns\" in Meds & Orders section of the refill encounter.              Passed - Medication is active on med list             Milagro Bullard 08/19/23 5:48 PM  "

## 2023-08-20 RX ORDER — METOPROLOL SUCCINATE 50 MG/1
TABLET, EXTENDED RELEASE ORAL
Qty: 90 TABLET | Refills: 1 | Status: SHIPPED | OUTPATIENT
Start: 2023-08-20 | End: 2024-02-26

## 2023-08-20 RX ORDER — LISINOPRIL AND HYDROCHLOROTHIAZIDE 20; 25 MG/1; MG/1
TABLET ORAL
Qty: 90 TABLET | Refills: 1 | Status: SHIPPED | OUTPATIENT
Start: 2023-08-20 | End: 2024-02-22

## 2023-10-21 ENCOUNTER — HEALTH MAINTENANCE LETTER (OUTPATIENT)
Age: 83
End: 2023-10-21

## 2024-02-22 ENCOUNTER — OFFICE VISIT (OUTPATIENT)
Dept: FAMILY MEDICINE | Facility: CLINIC | Age: 84
End: 2024-02-22
Payer: COMMERCIAL

## 2024-02-22 VITALS
SYSTOLIC BLOOD PRESSURE: 126 MMHG | BODY MASS INDEX: 27.39 KG/M2 | TEMPERATURE: 98 F | OXYGEN SATURATION: 96 % | RESPIRATION RATE: 16 BRPM | WEIGHT: 202.2 LBS | HEART RATE: 70 BPM | HEIGHT: 72 IN | DIASTOLIC BLOOD PRESSURE: 70 MMHG

## 2024-02-22 DIAGNOSIS — Z29.11 NEED FOR VACCINATION AGAINST RESPIRATORY SYNCYTIAL VIRUS: ICD-10-CM

## 2024-02-22 DIAGNOSIS — Z23 NEED FOR SHINGLES VACCINE: ICD-10-CM

## 2024-02-22 DIAGNOSIS — K59.00 CONSTIPATION, UNSPECIFIED CONSTIPATION TYPE: ICD-10-CM

## 2024-02-22 DIAGNOSIS — E78.00 PURE HYPERCHOLESTEROLEMIA: ICD-10-CM

## 2024-02-22 DIAGNOSIS — I10 ESSENTIAL HYPERTENSION: ICD-10-CM

## 2024-02-22 DIAGNOSIS — D49.2 ATYPICAL SQUAMOPROLIFERATIVE SKIN LESION: Primary | ICD-10-CM

## 2024-02-22 LAB
ANION GAP SERPL CALCULATED.3IONS-SCNC: 14 MMOL/L (ref 7–15)
BUN SERPL-MCNC: 30.1 MG/DL (ref 8–23)
CALCIUM SERPL-MCNC: 9.7 MG/DL (ref 8.8–10.2)
CHLORIDE SERPL-SCNC: 102 MMOL/L (ref 98–107)
CREAT SERPL-MCNC: 3.09 MG/DL (ref 0.67–1.17)
DEPRECATED HCO3 PLAS-SCNC: 26 MMOL/L (ref 22–29)
EGFRCR SERPLBLD CKD-EPI 2021: 20 ML/MIN/1.73M2
GLUCOSE SERPL-MCNC: 99 MG/DL (ref 70–99)
POTASSIUM SERPL-SCNC: 4.4 MMOL/L (ref 3.4–5.3)
SODIUM SERPL-SCNC: 142 MMOL/L (ref 135–145)

## 2024-02-22 PROCEDURE — 99214 OFFICE O/P EST MOD 30 MIN: CPT | Performed by: FAMILY MEDICINE

## 2024-02-22 PROCEDURE — 80048 BASIC METABOLIC PNL TOTAL CA: CPT | Performed by: FAMILY MEDICINE

## 2024-02-22 PROCEDURE — 36415 COLL VENOUS BLD VENIPUNCTURE: CPT | Performed by: FAMILY MEDICINE

## 2024-02-22 RX ORDER — LISINOPRIL AND HYDROCHLOROTHIAZIDE 20; 25 MG/1; MG/1
1 TABLET ORAL DAILY
Qty: 90 TABLET | Refills: 1 | Status: SHIPPED | OUTPATIENT
Start: 2024-02-22 | End: 2024-08-08

## 2024-02-22 RX ORDER — RESPIRATORY SYNCYTIAL VIRUS VACCINE 120MCG/0.5
0.5 KIT INTRAMUSCULAR ONCE
Qty: 1 EACH | Refills: 0 | Status: CANCELLED | OUTPATIENT
Start: 2024-02-22 | End: 2024-02-22

## 2024-02-22 ASSESSMENT — PAIN SCALES - GENERAL: PAINLEVEL: NO PAIN (0)

## 2024-02-22 NOTE — PROGRESS NOTES
Assessment/Plan:    Atypical squamoproliferative skin lesion  Facial skin lesions question seborrheic keratosis etiology, raised, asymptomatic otherwise.  Patient declines dermatology referral at this time.  Will reassess at annual wellness visit in 3 to 4 months.    Essential hypertension  Hypertension well-controlled with lisinopril hydrochlorothiazide 20/25 daily metoprolol succinate 50 mg daily.  Medication monitoring completed today with reassessment at annual wellness visit in 3 to 4 months.  - lisinopril-hydrochlorothiazide (ZESTORETIC) 20-25 MG tablet  Dispense: 90 tablet; Refill: 1  - Basic metabolic panel  - Basic metabolic panel    Constipation, unspecified constipation type  Constipation concerns intermittent.  Describes using suppository with bowel movement typically a few hours later.  Fine for a few days then loose stool followed by constipation.  This has been happening over past 3 months.  Will utilize MiraLAX 17 g daily.  Ensure dietary fiber and appropriate fluid intake.    Pure hypercholesterolemia  History of hypercholesterolemia diet controlled currently.  Dietary and exercise modifications ongoing with anticipated fasting labs at annual wellness visit in 3 to 4 months.      The following high BMI interventions were performed this visit: encouragement to exercise, weight monitoring, weight loss from baseline weight, and lifestyle education regarding diet  Ensure ongoing efforts to achieve weight goal < 195 pounds initially, < 190 pounds ideally.         Subjective:    Reddy Allen is seen today for skin lesions on face.  Dentist noticed it.  Told him that he should have them looked at.  About 3 lesions on right cheek.  Has been there for at least 6 months.  They do not bother him.  No change otherwise.  Patient with underlying hypertension and needs refill on lisinopril hydrochlorothiazide 20/25 daily with addition of metoprolol succinate 50 mg daily ongoing.  Has had constipation issues  "described as loose stool followed by constipation.  Needs to take a pill which likely recognized as suppository then better for a few days before recurrent issues.  Remainder of home medications unchanged.  Comprehensive review of systems as above otherwise all negative.      Remarried \"[place]\" x 25 years  1 son -  1 daughter -  5 grandchildren  1 great-grandchild  Work: Retired x 9 years from  ()  Surgeries: 12 lap choly at [facility] (Dr. Wolf) after admit with pancreatitis, hepatitis, etc. (postop a. fib described without reoccurrence)  Hospitalizations: none  No smoke (quit 25 years ago after 2 ppd x 25 years)  No EtOH (no h/o problems)  Mom -  88 (CVA)  Dad -  77 (heart valve)  8 siblings - (3 ) - accidental broken neck after diving into a rock, lung CA (asbestos exposure), and cerebral hemorrhage         No past surgical history on file.     No family history on file.     No past medical history on file.     Social History     Tobacco Use    Smoking status: Former    Smokeless tobacco: Never   Substance Use Topics    Alcohol use: No    Drug use: No        Current Outpatient Medications   Medication Sig Dispense Refill    fluticasone propionate (FLONASE ALLERGY RELIEF) 50 mcg/actuation nasal spray [FLUTICASONE PROPIONATE (FLONASE ALLERGY RELIEF) 50 MCG/ACTUATION NASAL SPRAY] 1 spray into each nostril daily. 16 g 0    lisinopril-hydrochlorothiazide (ZESTORETIC) 20-25 MG tablet Take 1 tablet by mouth daily 90 tablet 1    metoprolol succinate ER (TOPROL XL) 50 MG 24 hr tablet Take 1 tablet by mouth once daily 90 tablet 1    MULTIVITS-MIN/HRB  (URINOZINC PROSTATE FORMULA ORAL) [MULTIVITS-MIN/HRB  (URINOZINC PROSTATE FORMULA ORAL)] Take by mouth.      cetirizine (ZYRTEC) 10 MG tablet [CETIRIZINE (ZYRTEC) 10 MG TABLET] Take 1 tablet (10 mg total) by mouth daily. (Patient not taking: Reported on 2024) 30 tablet 2    montelukast (SINGULAIR) 10 MG " "tablet Take 1 tablet (10 mg) by mouth At Bedtime (Patient not taking: Reported on 2/22/2024) 30 tablet 3    triamcinolone (KENALOG) 0.1 % external lotion Apply topically 2 times daily (Patient not taking: Reported on 2/22/2024) 60 mL 1    triamcinolone (KENALOG) 0.1 % external lotion Apply topically 3 times daily Apply topically to affected areas twice daily as needed (Patient not taking: Reported on 2/22/2024) 60 mL 1    UNABLE TO FIND [UNABLE TO FIND] Med Name:temeric - twice a day - to help lower BS            Objective:    Vitals:    02/22/24 1411   BP: 126/70   BP Location: Right arm   Patient Position: Sitting   Cuff Size: Adult Regular   Pulse: 70   Resp: 16   Temp: 98  F (36.7  C)   TempSrc: Temporal   SpO2: 96%   Weight: 91.7 kg (202 lb 3.2 oz)   Height: 1.835 m (6' 0.24\")      Body mass index is 27.24 kg/m .    Alert.  No apparent distress.  Does have at least 3 raised lesions on right cheek slightly tan in coloration question seborrheic keratosis etiology without ulceration, drainage etc.  No other skin concerns identified.  Chest clear.  Cardiac exam regular.  Extremities warm and dry.      This note has been dictated using voice recognition software and as a result may contain minor grammatical errors and unintended word substitutions.   "

## 2024-02-23 ENCOUNTER — LAB (OUTPATIENT)
Dept: LAB | Facility: CLINIC | Age: 84
End: 2024-02-23
Payer: COMMERCIAL

## 2024-02-23 DIAGNOSIS — N17.9 ACUTE RENAL FAILURE SUPERIMPOSED ON STAGE 3 CHRONIC KIDNEY DISEASE, UNSPECIFIED ACUTE RENAL FAILURE TYPE, UNSPECIFIED WHETHER STAGE 3A OR 3B CKD (H): ICD-10-CM

## 2024-02-23 DIAGNOSIS — N18.30 ACUTE RENAL FAILURE SUPERIMPOSED ON STAGE 3 CHRONIC KIDNEY DISEASE, UNSPECIFIED ACUTE RENAL FAILURE TYPE, UNSPECIFIED WHETHER STAGE 3A OR 3B CKD (H): Primary | ICD-10-CM

## 2024-02-23 DIAGNOSIS — N17.9 ACUTE RENAL FAILURE SUPERIMPOSED ON STAGE 3 CHRONIC KIDNEY DISEASE, UNSPECIFIED ACUTE RENAL FAILURE TYPE, UNSPECIFIED WHETHER STAGE 3A OR 3B CKD (H): Primary | ICD-10-CM

## 2024-02-23 DIAGNOSIS — N18.30 ACUTE RENAL FAILURE SUPERIMPOSED ON STAGE 3 CHRONIC KIDNEY DISEASE, UNSPECIFIED ACUTE RENAL FAILURE TYPE, UNSPECIFIED WHETHER STAGE 3A OR 3B CKD (H): ICD-10-CM

## 2024-02-23 LAB
ANION GAP SERPL CALCULATED.3IONS-SCNC: 11 MMOL/L (ref 7–15)
BUN SERPL-MCNC: 24.3 MG/DL (ref 8–23)
CALCIUM SERPL-MCNC: 9.1 MG/DL (ref 8.8–10.2)
CHLORIDE SERPL-SCNC: 101 MMOL/L (ref 98–107)
CREAT SERPL-MCNC: 1.28 MG/DL (ref 0.67–1.17)
DEPRECATED HCO3 PLAS-SCNC: 27 MMOL/L (ref 22–29)
EGFRCR SERPLBLD CKD-EPI 2021: 56 ML/MIN/1.73M2
GLUCOSE SERPL-MCNC: 101 MG/DL (ref 70–99)
POTASSIUM SERPL-SCNC: 3.7 MMOL/L (ref 3.4–5.3)
SODIUM SERPL-SCNC: 139 MMOL/L (ref 135–145)

## 2024-02-23 PROCEDURE — 36415 COLL VENOUS BLD VENIPUNCTURE: CPT

## 2024-02-23 PROCEDURE — 80048 BASIC METABOLIC PNL TOTAL CA: CPT

## 2024-02-24 DIAGNOSIS — I10 ESSENTIAL HYPERTENSION: ICD-10-CM

## 2024-02-26 RX ORDER — METOPROLOL SUCCINATE 50 MG/1
TABLET, EXTENDED RELEASE ORAL
Qty: 90 TABLET | Refills: 2 | Status: SHIPPED | OUTPATIENT
Start: 2024-02-26

## 2024-08-08 DIAGNOSIS — I10 ESSENTIAL HYPERTENSION: ICD-10-CM

## 2024-08-08 RX ORDER — LISINOPRIL AND HYDROCHLOROTHIAZIDE 20; 25 MG/1; MG/1
1 TABLET ORAL DAILY
Qty: 90 TABLET | Refills: 0 | Status: SHIPPED | OUTPATIENT
Start: 2024-08-08

## 2024-10-27 ENCOUNTER — APPOINTMENT (OUTPATIENT)
Dept: RADIOLOGY | Facility: HOSPITAL | Age: 84
DRG: 481 | End: 2024-10-27
Attending: EMERGENCY MEDICINE
Payer: COMMERCIAL

## 2024-10-27 ENCOUNTER — APPOINTMENT (OUTPATIENT)
Dept: CT IMAGING | Facility: HOSPITAL | Age: 84
DRG: 481 | End: 2024-10-27
Attending: EMERGENCY MEDICINE
Payer: COMMERCIAL

## 2024-10-27 ENCOUNTER — ANESTHESIA EVENT (OUTPATIENT)
Dept: SURGERY | Facility: HOSPITAL | Age: 84
DRG: 481 | End: 2024-10-27
Payer: COMMERCIAL

## 2024-10-27 ENCOUNTER — ANESTHESIA (OUTPATIENT)
Dept: SURGERY | Facility: HOSPITAL | Age: 84
DRG: 481 | End: 2024-10-27
Payer: COMMERCIAL

## 2024-10-27 ENCOUNTER — APPOINTMENT (OUTPATIENT)
Dept: RADIOLOGY | Facility: HOSPITAL | Age: 84
DRG: 481 | End: 2024-10-27
Attending: ORTHOPAEDIC SURGERY
Payer: COMMERCIAL

## 2024-10-27 ENCOUNTER — HOSPITAL ENCOUNTER (INPATIENT)
Facility: HOSPITAL | Age: 84
LOS: 3 days | Discharge: NURSING, REHAB OR LONG-TERM CARE | DRG: 481 | End: 2024-10-30
Attending: EMERGENCY MEDICINE | Admitting: INTERNAL MEDICINE
Payer: COMMERCIAL

## 2024-10-27 DIAGNOSIS — S72.002A CLOSED FRACTURE OF LEFT HIP, INITIAL ENCOUNTER (H): ICD-10-CM

## 2024-10-27 DIAGNOSIS — S72.142A CLOSED DISPLACED INTERTROCHANTERIC FRACTURE OF LEFT FEMUR, INITIAL ENCOUNTER (H): Primary | ICD-10-CM

## 2024-10-27 LAB
ANION GAP SERPL CALCULATED.3IONS-SCNC: 13 MMOL/L (ref 7–15)
BUN SERPL-MCNC: 21.3 MG/DL (ref 8–23)
CALCIUM SERPL-MCNC: 8.9 MG/DL (ref 8.8–10.4)
CHLORIDE SERPL-SCNC: 105 MMOL/L (ref 98–107)
CREAT SERPL-MCNC: 1.24 MG/DL (ref 0.67–1.17)
EGFRCR SERPLBLD CKD-EPI 2021: 58 ML/MIN/1.73M2
ERYTHROCYTE [DISTWIDTH] IN BLOOD BY AUTOMATED COUNT: 12.5 % (ref 10–15)
EST. AVERAGE GLUCOSE BLD GHB EST-MCNC: 120 MG/DL
GLUCOSE SERPL-MCNC: 151 MG/DL (ref 70–99)
HBA1C MFR BLD: 5.8 %
HCO3 SERPL-SCNC: 25 MMOL/L (ref 22–29)
HCT VFR BLD AUTO: 43.9 % (ref 40–53)
HGB BLD-MCNC: 14.9 G/DL (ref 13.3–17.7)
HOLD SPECIMEN: NORMAL
MAGNESIUM SERPL-MCNC: 1.6 MG/DL (ref 1.7–2.3)
MCH RBC QN AUTO: 30.5 PG (ref 26.5–33)
MCHC RBC AUTO-ENTMCNC: 33.9 G/DL (ref 31.5–36.5)
MCV RBC AUTO: 90 FL (ref 78–100)
PLATELET # BLD AUTO: 250 10E3/UL (ref 150–450)
POTASSIUM SERPL-SCNC: 3.6 MMOL/L (ref 3.4–5.3)
RBC # BLD AUTO: 4.88 10E6/UL (ref 4.4–5.9)
SODIUM SERPL-SCNC: 143 MMOL/L (ref 135–145)
WBC # BLD AUTO: 8.3 10E3/UL (ref 4–11)

## 2024-10-27 PROCEDURE — 250N000011 HC RX IP 250 OP 636

## 2024-10-27 PROCEDURE — 370N000017 HC ANESTHESIA TECHNICAL FEE, PER MIN: Performed by: ORTHOPAEDIC SURGERY

## 2024-10-27 PROCEDURE — 99140 ANES COMP EMERGENCY COND: CPT

## 2024-10-27 PROCEDURE — 0QS736Z REPOSITION LEFT UPPER FEMUR WITH INTRAMEDULLARY INTERNAL FIXATION DEVICE, PERCUTANEOUS APPROACH: ICD-10-PCS | Performed by: ORTHOPAEDIC SURGERY

## 2024-10-27 PROCEDURE — 72125 CT NECK SPINE W/O DYE: CPT

## 2024-10-27 PROCEDURE — 258N000003 HC RX IP 258 OP 636: Performed by: INTERNAL MEDICINE

## 2024-10-27 PROCEDURE — 272N000001 HC OR GENERAL SUPPLY STERILE: Performed by: ORTHOPAEDIC SURGERY

## 2024-10-27 PROCEDURE — 93005 ELECTROCARDIOGRAM TRACING: CPT | Performed by: EMERGENCY MEDICINE

## 2024-10-27 PROCEDURE — 710N000009 HC RECOVERY PHASE 1, LEVEL 1, PER MIN: Performed by: ORTHOPAEDIC SURGERY

## 2024-10-27 PROCEDURE — 250N000013 HC RX MED GY IP 250 OP 250 PS 637: Performed by: PHYSICIAN ASSISTANT

## 2024-10-27 PROCEDURE — 73560 X-RAY EXAM OF KNEE 1 OR 2: CPT | Mod: LT

## 2024-10-27 PROCEDURE — P9045 ALBUMIN (HUMAN), 5%, 250 ML: HCPCS | Performed by: ANESTHESIOLOGY

## 2024-10-27 PROCEDURE — 999N000180 XR SURGERY CARM FLUORO LESS THAN 5 MIN: Mod: TC

## 2024-10-27 PROCEDURE — 27244 TREAT THIGH FRACTURE: CPT | Performed by: ANESTHESIOLOGY

## 2024-10-27 PROCEDURE — P9045 ALBUMIN (HUMAN), 5%, 250 ML: HCPCS | Performed by: INTERNAL MEDICINE

## 2024-10-27 PROCEDURE — 250N000011 HC RX IP 250 OP 636: Performed by: ANESTHESIOLOGY

## 2024-10-27 PROCEDURE — 36415 COLL VENOUS BLD VENIPUNCTURE: CPT | Performed by: EMERGENCY MEDICINE

## 2024-10-27 PROCEDURE — 258N000003 HC RX IP 258 OP 636: Performed by: PHYSICIAN ASSISTANT

## 2024-10-27 PROCEDURE — 99285 EMERGENCY DEPT VISIT HI MDM: CPT | Mod: 25

## 2024-10-27 PROCEDURE — 120N000001 HC R&B MED SURG/OB

## 2024-10-27 PROCEDURE — 85027 COMPLETE CBC AUTOMATED: CPT | Performed by: INTERNAL MEDICINE

## 2024-10-27 PROCEDURE — 250N000011 HC RX IP 250 OP 636: Performed by: EMERGENCY MEDICINE

## 2024-10-27 PROCEDURE — 360N000084 HC SURGERY LEVEL 4 W/ FLUORO, PER MIN: Performed by: ORTHOPAEDIC SURGERY

## 2024-10-27 PROCEDURE — 96374 THER/PROPH/DIAG INJ IV PUSH: CPT

## 2024-10-27 PROCEDURE — 258N000003 HC RX IP 258 OP 636

## 2024-10-27 PROCEDURE — 83036 HEMOGLOBIN GLYCOSYLATED A1C: CPT | Performed by: INTERNAL MEDICINE

## 2024-10-27 PROCEDURE — 70450 CT HEAD/BRAIN W/O DYE: CPT

## 2024-10-27 PROCEDURE — 999N000141 HC STATISTIC PRE-PROCEDURE NURSING ASSESSMENT: Performed by: ORTHOPAEDIC SURGERY

## 2024-10-27 PROCEDURE — 250N000011 HC RX IP 250 OP 636: Mod: JW | Performed by: ANESTHESIOLOGY

## 2024-10-27 PROCEDURE — 99100 ANES PT EXTEME AGE<1 YR&>70: CPT

## 2024-10-27 PROCEDURE — 83735 ASSAY OF MAGNESIUM: CPT | Performed by: INTERNAL MEDICINE

## 2024-10-27 PROCEDURE — C1713 ANCHOR/SCREW BN/BN,TIS/BN: HCPCS | Performed by: ORTHOPAEDIC SURGERY

## 2024-10-27 PROCEDURE — 80048 BASIC METABOLIC PNL TOTAL CA: CPT | Performed by: INTERNAL MEDICINE

## 2024-10-27 PROCEDURE — C1769 GUIDE WIRE: HCPCS | Performed by: ORTHOPAEDIC SURGERY

## 2024-10-27 PROCEDURE — 99223 1ST HOSP IP/OBS HIGH 75: CPT | Performed by: INTERNAL MEDICINE

## 2024-10-27 PROCEDURE — 73502 X-RAY EXAM HIP UNI 2-3 VIEWS: CPT

## 2024-10-27 PROCEDURE — 27244 TREAT THIGH FRACTURE: CPT

## 2024-10-27 PROCEDURE — 250N000011 HC RX IP 250 OP 636: Performed by: INTERNAL MEDICINE

## 2024-10-27 DEVICE — 10MM/125 DEG TI CANN TFNA 170MM - STERILE
Type: IMPLANTABLE DEVICE | Site: HIP | Status: FUNCTIONAL
Brand: TFN-ADVANCE

## 2024-10-27 DEVICE — TFNA FENESTRATED SCREW 100MM - STERILE
Type: IMPLANTABLE DEVICE | Site: HIP | Status: FUNCTIONAL
Brand: TFN-ADVANCE

## 2024-10-27 DEVICE — LOCKING SCREW FOR IM NAIL Ø 5MM/ 40MM/ XL25/ STERILE: Type: IMPLANTABLE DEVICE | Site: HIP | Status: FUNCTIONAL

## 2024-10-27 RX ORDER — LIDOCAINE 40 MG/G
CREAM TOPICAL
Status: DISCONTINUED | OUTPATIENT
Start: 2024-10-27 | End: 2024-10-28

## 2024-10-27 RX ORDER — HYDROMORPHONE HCL IN WATER/PF 6 MG/30 ML
0.4 PATIENT CONTROLLED ANALGESIA SYRINGE INTRAVENOUS EVERY 5 MIN PRN
Status: DISCONTINUED | OUTPATIENT
Start: 2024-10-27 | End: 2024-10-27 | Stop reason: HOSPADM

## 2024-10-27 RX ORDER — CETIRIZINE HYDROCHLORIDE 10 MG/1
10 TABLET ORAL DAILY PRN
Status: DISCONTINUED | OUTPATIENT
Start: 2024-10-27 | End: 2024-10-30 | Stop reason: HOSPADM

## 2024-10-27 RX ORDER — NALOXONE HYDROCHLORIDE 0.4 MG/ML
0.4 INJECTION, SOLUTION INTRAMUSCULAR; INTRAVENOUS; SUBCUTANEOUS
Status: DISCONTINUED | OUTPATIENT
Start: 2024-10-27 | End: 2024-10-30 | Stop reason: HOSPADM

## 2024-10-27 RX ORDER — LIDOCAINE 40 MG/G
CREAM TOPICAL
Status: DISCONTINUED | OUTPATIENT
Start: 2024-10-27 | End: 2024-10-27 | Stop reason: HOSPADM

## 2024-10-27 RX ORDER — ONDANSETRON 2 MG/ML
4 INJECTION INTRAMUSCULAR; INTRAVENOUS EVERY 6 HOURS PRN
Status: DISCONTINUED | OUTPATIENT
Start: 2024-10-27 | End: 2024-10-27

## 2024-10-27 RX ORDER — OXYCODONE HYDROCHLORIDE 5 MG/1
10 TABLET ORAL EVERY 4 HOURS PRN
Status: DISCONTINUED | OUTPATIENT
Start: 2024-10-27 | End: 2024-10-30

## 2024-10-27 RX ORDER — SODIUM CHLORIDE, SODIUM LACTATE, POTASSIUM CHLORIDE, CALCIUM CHLORIDE 600; 310; 30; 20 MG/100ML; MG/100ML; MG/100ML; MG/100ML
INJECTION, SOLUTION INTRAVENOUS CONTINUOUS
Status: DISCONTINUED | OUTPATIENT
Start: 2024-10-27 | End: 2024-10-27 | Stop reason: HOSPADM

## 2024-10-27 RX ORDER — BUPIVACAINE HYDROCHLORIDE 7.5 MG/ML
INJECTION, SOLUTION INTRASPINAL
Status: COMPLETED | OUTPATIENT
Start: 2024-10-27 | End: 2024-10-27

## 2024-10-27 RX ORDER — BISACODYL 10 MG
10 SUPPOSITORY, RECTAL RECTAL DAILY PRN
Status: DISCONTINUED | OUTPATIENT
Start: 2024-10-30 | End: 2024-10-30 | Stop reason: HOSPADM

## 2024-10-27 RX ORDER — POLYETHYLENE GLYCOL 3350 17 G/17G
17 POWDER, FOR SOLUTION ORAL 2 TIMES DAILY PRN
Status: DISCONTINUED | OUTPATIENT
Start: 2024-10-27 | End: 2024-10-30 | Stop reason: HOSPADM

## 2024-10-27 RX ORDER — HYDROMORPHONE HYDROCHLORIDE 1 MG/ML
0.5 INJECTION, SOLUTION INTRAMUSCULAR; INTRAVENOUS; SUBCUTANEOUS ONCE
Status: COMPLETED | OUTPATIENT
Start: 2024-10-27 | End: 2024-10-27

## 2024-10-27 RX ORDER — ONDANSETRON 2 MG/ML
4 INJECTION INTRAMUSCULAR; INTRAVENOUS EVERY 6 HOURS PRN
Status: DISCONTINUED | OUTPATIENT
Start: 2024-10-27 | End: 2024-10-30 | Stop reason: HOSPADM

## 2024-10-27 RX ORDER — DEXAMETHASONE SODIUM PHOSPHATE 10 MG/ML
4 INJECTION, SOLUTION INTRAMUSCULAR; INTRAVENOUS
Status: DISCONTINUED | OUTPATIENT
Start: 2024-10-27 | End: 2024-10-27 | Stop reason: HOSPADM

## 2024-10-27 RX ORDER — POLYETHYLENE GLYCOL 3350 17 G/17G
17 POWDER, FOR SOLUTION ORAL DAILY
Status: DISCONTINUED | OUTPATIENT
Start: 2024-10-28 | End: 2024-10-30 | Stop reason: HOSPADM

## 2024-10-27 RX ORDER — ONDANSETRON 2 MG/ML
INJECTION INTRAMUSCULAR; INTRAVENOUS PRN
Status: DISCONTINUED | OUTPATIENT
Start: 2024-10-27 | End: 2024-10-27

## 2024-10-27 RX ORDER — ACETAMINOPHEN 325 MG/1
650 TABLET ORAL EVERY 4 HOURS PRN
Status: DISCONTINUED | OUTPATIENT
Start: 2024-10-27 | End: 2024-10-30 | Stop reason: HOSPADM

## 2024-10-27 RX ORDER — AMOXICILLIN 250 MG
1 CAPSULE ORAL 2 TIMES DAILY PRN
Status: DISCONTINUED | OUTPATIENT
Start: 2024-10-27 | End: 2024-10-30 | Stop reason: HOSPADM

## 2024-10-27 RX ORDER — ACETAMINOPHEN 650 MG/1
650 SUPPOSITORY RECTAL EVERY 4 HOURS PRN
Status: DISCONTINUED | OUTPATIENT
Start: 2024-10-27 | End: 2024-10-30 | Stop reason: HOSPADM

## 2024-10-27 RX ORDER — ACETAMINOPHEN 325 MG/1
650 TABLET ORAL EVERY 4 HOURS PRN
Status: DISCONTINUED | OUTPATIENT
Start: 2024-10-30 | End: 2024-10-27

## 2024-10-27 RX ORDER — PROCHLORPERAZINE MALEATE 5 MG/1
5 TABLET ORAL EVERY 6 HOURS PRN
Status: DISCONTINUED | OUTPATIENT
Start: 2024-10-27 | End: 2024-10-30 | Stop reason: HOSPADM

## 2024-10-27 RX ORDER — ONDANSETRON 4 MG/1
4 TABLET, ORALLY DISINTEGRATING ORAL EVERY 30 MIN PRN
Status: DISCONTINUED | OUTPATIENT
Start: 2024-10-27 | End: 2024-10-27 | Stop reason: HOSPADM

## 2024-10-27 RX ORDER — CEFAZOLIN SODIUM/WATER 2 G/20 ML
SYRINGE (ML) INTRAVENOUS
Status: DISCONTINUED
Start: 2024-10-27 | End: 2024-10-27 | Stop reason: HOSPADM

## 2024-10-27 RX ORDER — LIDOCAINE 40 MG/G
CREAM TOPICAL
Status: DISCONTINUED | OUTPATIENT
Start: 2024-10-27 | End: 2024-10-30 | Stop reason: HOSPADM

## 2024-10-27 RX ORDER — SODIUM CHLORIDE, SODIUM LACTATE, POTASSIUM CHLORIDE, CALCIUM CHLORIDE 600; 310; 30; 20 MG/100ML; MG/100ML; MG/100ML; MG/100ML
INJECTION, SOLUTION INTRAVENOUS CONTINUOUS PRN
Status: DISCONTINUED | OUTPATIENT
Start: 2024-10-27 | End: 2024-10-27

## 2024-10-27 RX ORDER — FENTANYL CITRATE 50 UG/ML
INJECTION, SOLUTION INTRAMUSCULAR; INTRAVENOUS PRN
Status: DISCONTINUED | OUTPATIENT
Start: 2024-10-27 | End: 2024-10-27

## 2024-10-27 RX ORDER — CEFAZOLIN SODIUM 2 G/100ML
2 INJECTION, SOLUTION INTRAVENOUS EVERY 8 HOURS
Status: COMPLETED | OUTPATIENT
Start: 2024-10-28 | End: 2024-10-28

## 2024-10-27 RX ORDER — METOPROLOL SUCCINATE 50 MG/1
50 TABLET, EXTENDED RELEASE ORAL DAILY
Status: DISCONTINUED | OUTPATIENT
Start: 2024-10-28 | End: 2024-10-30 | Stop reason: HOSPADM

## 2024-10-27 RX ORDER — HYDROMORPHONE HCL IN WATER/PF 6 MG/30 ML
0.2 PATIENT CONTROLLED ANALGESIA SYRINGE INTRAVENOUS
Status: DISCONTINUED | OUTPATIENT
Start: 2024-10-27 | End: 2024-10-30 | Stop reason: HOSPADM

## 2024-10-27 RX ORDER — ONDANSETRON 4 MG/1
4 TABLET, ORALLY DISINTEGRATING ORAL EVERY 6 HOURS PRN
Status: DISCONTINUED | OUTPATIENT
Start: 2024-10-27 | End: 2024-10-30 | Stop reason: HOSPADM

## 2024-10-27 RX ORDER — ASPIRIN 81 MG/1
81 TABLET ORAL 2 TIMES DAILY
Status: DISCONTINUED | OUTPATIENT
Start: 2024-10-27 | End: 2024-10-30 | Stop reason: HOSPADM

## 2024-10-27 RX ORDER — SODIUM CHLORIDE, SODIUM LACTATE, POTASSIUM CHLORIDE, CALCIUM CHLORIDE 600; 310; 30; 20 MG/100ML; MG/100ML; MG/100ML; MG/100ML
INJECTION, SOLUTION INTRAVENOUS CONTINUOUS
Status: DISCONTINUED | OUTPATIENT
Start: 2024-10-27 | End: 2024-10-28

## 2024-10-27 RX ORDER — FENTANYL CITRATE 50 UG/ML
25 INJECTION, SOLUTION INTRAMUSCULAR; INTRAVENOUS EVERY 5 MIN PRN
Status: DISCONTINUED | OUTPATIENT
Start: 2024-10-27 | End: 2024-10-27 | Stop reason: HOSPADM

## 2024-10-27 RX ORDER — FENTANYL CITRATE 50 UG/ML
50 INJECTION, SOLUTION INTRAMUSCULAR; INTRAVENOUS EVERY 5 MIN PRN
Status: DISCONTINUED | OUTPATIENT
Start: 2024-10-27 | End: 2024-10-27 | Stop reason: HOSPADM

## 2024-10-27 RX ORDER — NALOXONE HYDROCHLORIDE 0.4 MG/ML
0.2 INJECTION, SOLUTION INTRAMUSCULAR; INTRAVENOUS; SUBCUTANEOUS
Status: DISCONTINUED | OUTPATIENT
Start: 2024-10-27 | End: 2024-10-30 | Stop reason: HOSPADM

## 2024-10-27 RX ORDER — DEXAMETHASONE SODIUM PHOSPHATE 10 MG/ML
INJECTION, SOLUTION INTRAMUSCULAR; INTRAVENOUS PRN
Status: DISCONTINUED | OUTPATIENT
Start: 2024-10-27 | End: 2024-10-27

## 2024-10-27 RX ORDER — CEFAZOLIN SODIUM/WATER 2 G/20 ML
SYRINGE (ML) INTRAVENOUS PRN
Status: DISCONTINUED | OUTPATIENT
Start: 2024-10-27 | End: 2024-10-27

## 2024-10-27 RX ORDER — ONDANSETRON 2 MG/ML
4 INJECTION INTRAMUSCULAR; INTRAVENOUS EVERY 30 MIN PRN
Status: DISCONTINUED | OUTPATIENT
Start: 2024-10-27 | End: 2024-10-27 | Stop reason: HOSPADM

## 2024-10-27 RX ORDER — OXYCODONE HYDROCHLORIDE 5 MG/1
5 TABLET ORAL EVERY 4 HOURS PRN
Status: DISCONTINUED | OUTPATIENT
Start: 2024-10-27 | End: 2024-10-30

## 2024-10-27 RX ORDER — CALCIUM CARBONATE 500 MG/1
1000 TABLET, CHEWABLE ORAL 4 TIMES DAILY PRN
Status: DISCONTINUED | OUTPATIENT
Start: 2024-10-27 | End: 2024-10-30 | Stop reason: HOSPADM

## 2024-10-27 RX ORDER — HYDROMORPHONE HCL IN WATER/PF 6 MG/30 ML
0.2 PATIENT CONTROLLED ANALGESIA SYRINGE INTRAVENOUS EVERY 5 MIN PRN
Status: DISCONTINUED | OUTPATIENT
Start: 2024-10-27 | End: 2024-10-27 | Stop reason: HOSPADM

## 2024-10-27 RX ORDER — CETIRIZINE HYDROCHLORIDE 10 MG/1
10 TABLET ORAL DAILY PRN
COMMUNITY

## 2024-10-27 RX ORDER — ACETAMINOPHEN 325 MG/1
975 TABLET ORAL EVERY 8 HOURS
Status: COMPLETED | OUTPATIENT
Start: 2024-10-27 | End: 2024-10-30

## 2024-10-27 RX ORDER — SODIUM CHLORIDE 9 MG/ML
INJECTION, SOLUTION INTRAVENOUS CONTINUOUS
Status: DISCONTINUED | OUTPATIENT
Start: 2024-10-27 | End: 2024-10-28

## 2024-10-27 RX ORDER — HYDROMORPHONE HYDROCHLORIDE 1 MG/ML
0.3 INJECTION, SOLUTION INTRAMUSCULAR; INTRAVENOUS; SUBCUTANEOUS
Status: DISCONTINUED | OUTPATIENT
Start: 2024-10-27 | End: 2024-10-27

## 2024-10-27 RX ORDER — NALOXONE HYDROCHLORIDE 0.4 MG/ML
0.1 INJECTION, SOLUTION INTRAMUSCULAR; INTRAVENOUS; SUBCUTANEOUS
Status: DISCONTINUED | OUTPATIENT
Start: 2024-10-27 | End: 2024-10-27 | Stop reason: HOSPADM

## 2024-10-27 RX ORDER — AMOXICILLIN 250 MG
2 CAPSULE ORAL 2 TIMES DAILY PRN
Status: DISCONTINUED | OUTPATIENT
Start: 2024-10-27 | End: 2024-10-30 | Stop reason: HOSPADM

## 2024-10-27 RX ORDER — MAGNESIUM SULFATE HEPTAHYDRATE 40 MG/ML
2 INJECTION, SOLUTION INTRAVENOUS ONCE
Status: COMPLETED | OUTPATIENT
Start: 2024-10-27 | End: 2024-10-27

## 2024-10-27 RX ORDER — PROPOFOL 10 MG/ML
INJECTION, EMULSION INTRAVENOUS CONTINUOUS PRN
Status: DISCONTINUED | OUTPATIENT
Start: 2024-10-27 | End: 2024-10-27

## 2024-10-27 RX ORDER — AMOXICILLIN 250 MG
1 CAPSULE ORAL 2 TIMES DAILY
Status: DISCONTINUED | OUTPATIENT
Start: 2024-10-27 | End: 2024-10-30 | Stop reason: HOSPADM

## 2024-10-27 RX ORDER — HYDROMORPHONE HCL IN WATER/PF 6 MG/30 ML
0.4 PATIENT CONTROLLED ANALGESIA SYRINGE INTRAVENOUS
Status: DISCONTINUED | OUTPATIENT
Start: 2024-10-27 | End: 2024-10-30 | Stop reason: HOSPADM

## 2024-10-27 RX ADMIN — SODIUM CHLORIDE, POTASSIUM CHLORIDE, SODIUM LACTATE AND CALCIUM CHLORIDE: 600; 310; 30; 20 INJECTION, SOLUTION INTRAVENOUS at 20:54

## 2024-10-27 RX ADMIN — ALBUMIN HUMAN 12.5 G: 0.05 INJECTION, SOLUTION INTRAVENOUS at 21:40

## 2024-10-27 RX ADMIN — BUPIVACAINE HYDROCHLORIDE IN DEXTROSE 1.6 ML: 7.5 INJECTION, SOLUTION SUBARACHNOID at 19:49

## 2024-10-27 RX ADMIN — PROPOFOL 75 MCG/KG/MIN: 10 INJECTION, EMULSION INTRAVENOUS at 20:09

## 2024-10-27 RX ADMIN — HYDROMORPHONE HYDROCHLORIDE 0.3 MG: 1 INJECTION, SOLUTION INTRAMUSCULAR; INTRAVENOUS; SUBCUTANEOUS at 16:40

## 2024-10-27 RX ADMIN — HYDROMORPHONE HYDROCHLORIDE 0.5 MG: 1 INJECTION, SOLUTION INTRAMUSCULAR; INTRAVENOUS; SUBCUTANEOUS at 13:20

## 2024-10-27 RX ADMIN — ONDANSETRON 4 MG: 2 INJECTION INTRAMUSCULAR; INTRAVENOUS at 20:56

## 2024-10-27 RX ADMIN — FENTANYL CITRATE 50 MCG: 50 INJECTION INTRAMUSCULAR; INTRAVENOUS at 19:53

## 2024-10-27 RX ADMIN — FENTANYL CITRATE 25 MCG: 50 INJECTION, SOLUTION INTRAMUSCULAR; INTRAVENOUS at 19:51

## 2024-10-27 RX ADMIN — ACETAMINOPHEN 975 MG: 325 TABLET ORAL at 22:47

## 2024-10-27 RX ADMIN — PHENYLEPHRINE HYDROCHLORIDE 200 MCG: 10 INJECTION INTRAVENOUS at 20:15

## 2024-10-27 RX ADMIN — PHENYLEPHRINE HYDROCHLORIDE 0.4 MCG/KG/MIN: 10 INJECTION INTRAVENOUS at 20:11

## 2024-10-27 RX ADMIN — ONDANSETRON 4 MG: 2 INJECTION INTRAMUSCULAR; INTRAVENOUS at 16:40

## 2024-10-27 RX ADMIN — HYDROMORPHONE HYDROCHLORIDE 0.5 MG: 1 INJECTION, SOLUTION INTRAMUSCULAR; INTRAVENOUS; SUBCUTANEOUS at 11:22

## 2024-10-27 RX ADMIN — ALBUMIN HUMAN 12.5 G: 0.05 INJECTION, SOLUTION INTRAVENOUS at 21:54

## 2024-10-27 RX ADMIN — DEXAMETHASONE SODIUM PHOSPHATE 5 MG: 10 INJECTION, SOLUTION INTRAMUSCULAR; INTRAVENOUS at 19:52

## 2024-10-27 RX ADMIN — Medication 2 G: at 20:01

## 2024-10-27 RX ADMIN — ASPIRIN 81 MG: 81 TABLET, COATED ORAL at 22:50

## 2024-10-27 RX ADMIN — PHENYLEPHRINE HYDROCHLORIDE 200 MCG: 10 INJECTION INTRAVENOUS at 20:06

## 2024-10-27 RX ADMIN — SODIUM CHLORIDE: 9 INJECTION, SOLUTION INTRAVENOUS at 13:20

## 2024-10-27 RX ADMIN — SODIUM CHLORIDE, POTASSIUM CHLORIDE, SODIUM LACTATE AND CALCIUM CHLORIDE: 600; 310; 30; 20 INJECTION, SOLUTION INTRAVENOUS at 22:53

## 2024-10-27 RX ADMIN — SENNOSIDES AND DOCUSATE SODIUM 1 TABLET: 8.6; 5 TABLET ORAL at 22:50

## 2024-10-27 RX ADMIN — PHENYLEPHRINE HYDROCHLORIDE 100 MCG: 10 INJECTION INTRAVENOUS at 20:01

## 2024-10-27 RX ADMIN — MAGNESIUM SULFATE HEPTAHYDRATE 2 G: 40 INJECTION, SOLUTION INTRAVENOUS at 13:56

## 2024-10-27 ASSESSMENT — ENCOUNTER SYMPTOMS
BACK PAIN: 0
HEADACHES: 0
NECK PAIN: 0
DYSRHYTHMIAS: 1
ARTHRALGIAS: 1
NAUSEA: 0
VOMITING: 0
NUMBNESS: 0

## 2024-10-27 ASSESSMENT — ACTIVITIES OF DAILY LIVING (ADL)
ADLS_ACUITY_SCORE: 0
DEPENDENT_IADLS:: INDEPENDENT
ADLS_ACUITY_SCORE: 0

## 2024-10-27 ASSESSMENT — COLUMBIA-SUICIDE SEVERITY RATING SCALE - C-SSRS
1. IN THE PAST MONTH, HAVE YOU WISHED YOU WERE DEAD OR WISHED YOU COULD GO TO SLEEP AND NOT WAKE UP?: NO
6. HAVE YOU EVER DONE ANYTHING, STARTED TO DO ANYTHING, OR PREPARED TO DO ANYTHING TO END YOUR LIFE?: NO
2. HAVE YOU ACTUALLY HAD ANY THOUGHTS OF KILLING YOURSELF IN THE PAST MONTH?: NO

## 2024-10-27 NOTE — ED TRIAGE NOTES
Pt tripped at [employer] did not see stool while carrying tray. Pt having Lt lateral hip pain w/ wt bearing, not on thinners, did bump head, no wound seen.     Triage Assessment (Adult)       Row Name 10/27/24 1100          Triage Assessment    Airway WDL WDL        Respiratory WDL    Respiratory WDL WDL        Skin Circulation/Temperature WDL    Skin Circulation/Temperature WDL WDL        Cardiac WDL    Cardiac WDL WDL        Peripheral/Neurovascular WDL    Peripheral Neurovascular WDL WDL        Cognitive/Neuro/Behavioral WDL    Cognitive/Neuro/Behavioral WDL WDL

## 2024-10-27 NOTE — ED PROVIDER NOTES
Emergency Department Midlevel Supervisory Note     I personally saw the patient and performed a substantive portion of the visit including all aspects of the medical decision making. I personally made/approved the management plan and take responsibility for the patient management.      ED Course:  12:08 PM Trish Marin PA-C staffed patient with me. I agree with their assessment and plan of management, and I will see the patient.  12:11 PM I met with the patient to introduce myself, gather additional history, perform my initial exam, and discuss the plan.     83 year old male, history of HTN, HLD and PAF (had once - not anticoagulated), who presents for evaluation of left hip pain after a mechanical fall when he tripped over a stool at [employer] onto his left hip.     On exam, LLE is shortened and externally rotated with CMS intact.     X-rays demonstrate acute impacted, displaced and varus angulated intertrochanteric left proximal femur fracture. No dislocation of the left femoral head from the acetabular socket. No displaced pelvic fracture is identified.      He did hit his head, but denies LOC and headache. No neck pain, extremity weakness or paresthesias. No blood thinners.     Precautionary head CT and CT cervical spine performed and were negative.     Labs remarkable for no leukocytosis or anemia.  He has chronic and stable renal insufficiency (creatinine 1.24, GFR 58) with no significant associated electrolyte derangements.    Nothing in history or on exam to suggest significant chest, abdominal or back injury and I do not think emergent imaging studies to evaluate for such are indicated.    [facility] consulted and will try to take patient to OR later today.    Patient admitted to Hospitalist Service. Patient stable throughout ED course.       FINAL IMPRESSION:    ICD-10-CM    1. Closed displaced intertrochanteric fracture of left femur, initial encounter (H)  S72.142A Case Request:  INTERNAL FIXATION, FRACTURE, TROCHANTERIC, HIP, USING PINS OR RODS     Case Request: INTERNAL FIXATION, FRACTURE, TROCHANTERIC, HIP, USING PINS OR RODS      2. Closed fracture of left hip, initial encounter (H)  S72.002A               MEDICATIONS GIVEN IN THE EMERGENCY DEPARTMENT:  Medications   lidocaine 1 % 0.1-1 mL ( Other Auto Hold 10/27/24 1820)   lidocaine (LMX4) cream ( Topical Auto Hold 10/27/24 1820)   sodium chloride (PF) 0.9% PF flush 3 mL ( Intracatheter Automatically Held 10/30/24 2130)   sodium chloride (PF) 0.9% PF flush 3 mL ( Intracatheter Auto Hold 10/27/24 1820)   senna-docusate (SENOKOT-S/PERICOLACE) 8.6-50 MG per tablet 1 tablet ( Oral Auto Hold 10/27/24 1820)     Or   senna-docusate (SENOKOT-S/PERICOLACE) 8.6-50 MG per tablet 2 tablet ( Oral Auto Hold 10/27/24 1820)   calcium carbonate (TUMS) chewable tablet 1,000 mg ( Oral Auto Hold 10/27/24 1820)   polyethylene glycol (MIRALAX) Packet 17 g ( Oral Auto Hold 10/27/24 1820)   acetaminophen (TYLENOL) tablet 650 mg ( Oral Auto Hold 10/27/24 1820)     Or   acetaminophen (TYLENOL) Suppository 650 mg ( Rectal Auto Hold 10/27/24 1820)   sodium chloride 0.9 % infusion ( Intravenous Rate/Dose Verify 10/27/24 1659)   HYDROmorphone (PF) (DILAUDID) injection 0.3 mg ( Intravenous Auto Hold 10/27/24 1820)   cetirizine (zyrTEC) tablet 10 mg ( Oral Auto Hold 10/27/24 1820)   metoprolol succinate ER (TOPROL XL) 24 hr tablet 50 mg ( Oral Automatically Held 10/31/24 0800)   ondansetron (ZOFRAN) injection 4 mg ( Intravenous Auto Hold 10/27/24 1820)   lidocaine 1 % 0.1-1 mL (has no administration in time range)   lidocaine (LMX4) cream (has no administration in time range)   sodium chloride (PF) 0.9% PF flush 3 mL (has no administration in time range)   sodium chloride (PF) 0.9% PF flush 3 mL (has no administration in time range)   lactated ringers infusion (has no administration in time range)   lactated ringers infusion (has no administration in time range)    fentaNYL (PF) (SUBLIMAZE) injection 25 mcg (has no administration in time range)   fentaNYL (PF) (SUBLIMAZE) injection 50 mcg (has no administration in time range)   HYDROmorphone (DILAUDID) injection 0.2 mg (has no administration in time range)   HYDROmorphone (DILAUDID) injection 0.4 mg (has no administration in time range)   ondansetron (ZOFRAN ODT) ODT tab 4 mg (has no administration in time range)     Or   ondansetron (ZOFRAN) injection 4 mg (has no administration in time range)   dexAMETHasone PF (DECADRON) injection 4 mg (has no administration in time range)   prochlorperazine (COMPAZINE) injection 5 mg (has no administration in time range)   naloxone (NARCAN) injection 0.1 mg (has no administration in time range)   HYDROmorphone (PF) (DILAUDID) injection 0.5 mg (0.5 mg Intravenous $Given 10/27/24 1122)   HYDROmorphone (PF) (DILAUDID) injection 0.5 mg (0.5 mg Intravenous $Given 10/27/24 1320)   magnesium sulfate 2 g in 50 mL sterile water intermittent infusion (0 g Intravenous Stopped 10/27/24 1500)       NEW PRESCRIPTIONS STARTED AT TODAY'S ED VISIT:  Current Discharge Medication List              CHIEF COMPLAINT:  Fall      Triage Note:Pt tripped at [facility] did not see stool while carrying tray. Pt having Lt lateral hip pain w/ wt bearing, not on thinners, did bump head, no wound seen.     Triage Assessment (Adult)       Row Name 10/27/24 1100          Triage Assessment    Airway WDL WDL        Respiratory WDL    Respiratory WDL WDL        Skin Circulation/Temperature WDL    Skin Circulation/Temperature WDL WDL        Cardiac WDL    Cardiac WDL WDL        Peripheral/Neurovascular WDL    Peripheral Neurovascular WDL WDL        Cognitive/Neuro/Behavioral WDL    Cognitive/Neuro/Behavioral WDL WDL                           HPI     HPI     Reddy Allen is an 83 year old male with a pertinent history of hypertension, hyperlipidemia and paroxysmal atrial fibrillation (had once - not anticoagulated), who  "presents to this ED by walk-in for evaluation of left hip pain after a fall.    Patient tripped over a stool at [facility] and fell landing onto his left hip. Reports immediate onset of left hip pain, unable to bear weight. Pain worsens with any movement. Denies associated weakness or paresthesias to the LLE.     He also hit his head, but denies LOC or headache. No neck pain, extremity weakness or paresthesias. No blood thinners.     He otherwise denies chest pain, shortness of breath, abdominal pain, back pain or other concerns.    Per patient's family member: the fall was unwitnessed. Patient fell because he was carrying a tray and did not see the stool.       Medical History     History reviewed. No pertinent past medical history.    History reviewed. No pertinent surgical history.    History reviewed. No pertinent family history.    Social History     Tobacco Use    Smoking status: Former    Smokeless tobacco: Never   Substance Use Topics    Alcohol use: No    Drug use: No       No current outpatient medications on file.    Physical Exam     First Vitals:  Patient Vitals for the past 24 hrs:   BP Temp Temp src Pulse Resp SpO2 Height Weight   10/27/24 1849 (!) 156/73 97.9  F (36.6  C) Temporal 83 18 99 % -- --   10/27/24 1653 (!) 155/72 98.2  F (36.8  C) Oral 96 20 94 % 1.88 m (6' 2\") 95 kg (209 lb 7 oz)   10/27/24 1515 136/63 -- -- 79 14 91 % -- --   10/27/24 1500 138/63 -- -- 78 17 96 % -- --   10/27/24 1445 126/60 -- -- 77 12 90 % -- --   10/27/24 1430 133/61 -- -- 77 12 93 % -- --   10/27/24 1415 (!) 142/66 -- -- 81 14 93 % -- --   10/27/24 1400 (!) 143/65 -- -- 81 11 95 % -- --   10/27/24 1345 (!) 143/67 -- -- 80 12 90 % -- --   10/27/24 1330 129/61 -- -- 78 12 93 % -- --   10/27/24 1315 (!) 143/66 -- -- 77 22 97 % -- --   10/27/24 1300 134/67 -- -- 78 -- 94 % -- --   10/27/24 1245 129/59 -- -- 73 -- 94 % -- --   10/27/24 1230 (!) 143/67 -- -- 74 -- 93 % -- --   10/27/24 1215 127/67 -- -- 74 -- 95 % -- -- " "  10/27/24 1200 131/62 -- -- -- -- -- -- --   10/27/24 1130 -- -- -- 76 -- 97 % -- --   10/27/24 1125 -- -- -- 75 18 96 % -- --   10/27/24 1115 (!) 149/66 -- -- -- -- -- -- --   10/27/24 1100 (!) 158/72 -- -- 81 -- 98 % -- --   10/27/24 1058 -- 98  F (36.7  C) Oral -- 16 -- 1.88 m (6' 2\") 90.7 kg (200 lb)       PHYSICAL EXAM:   Physical Exam    GENERAL: Awake, alert.  In no acute distress.   HEENT: Normocephalic, atraumatic.   NECK: No stridor.  PULMONARY: Chest is atraumatic. Symmetrical breath sounds without distress.  Lungs clear to auscultation bilaterally without wheezes, rhonchi or rales.  CARDIO: Regular rate and rhythm.  No significant murmur, rub or gallop.  Pedal pulses strong and symmetrical.  ABDOMINAL: Abdomen atraumatic, soft, non-distended and non-tender to palpation.    EXTREMITIES: LLE shortened and externally rotated. No lacerations. The LLE is warm and well perfused with strong and symmetrical pedal pulses. Patient able to wiggle all toes and sensation to light touch grossly intact. No lower extremity swelling or edema.      NEURO: Alert and oriented to person, place and time.  Cranial nerves grossly intact.  No focal motor deficit.  PSYCH: Normal mood and affect.      Results     LAB:  All pertinent labs reviewed and interpreted  Labs Ordered and Resulted from Time of ED Arrival to Time of ED Departure   BASIC METABOLIC PANEL - Abnormal       Result Value    Sodium 143      Potassium 3.6      Chloride 105      Carbon Dioxide (CO2) 25      Anion Gap 13      Urea Nitrogen 21.3      Creatinine 1.24 (*)     GFR Estimate 58 (*)     Calcium 8.9      Glucose 151 (*)    MAGNESIUM - Abnormal    Magnesium 1.6 (*)    HEMOGLOBIN A1C - Abnormal    Estimated Average Glucose 120 (*)     Hemoglobin A1C 5.8 (*)    CBC WITH PLATELETS - Normal    WBC Count 8.3      RBC Count 4.88      Hemoglobin 14.9      Hematocrit 43.9      MCV 90      MCH 30.5      MCHC 33.9      RDW 12.5      Platelet Count 250   "       RADIOLOGY:  POC US Guidance Needle Placement   Final Result      XR Knee Left 1/2 Views   Final Result   IMPRESSION: Nonstandard exam. Repeat exam when clinically feasible recommended.      No acute displaced left knee fracture or kristan dislocation is identified. Mild degenerative change left knee. No sizable left knee joint effusion.      XR Pelvis and Hip Left 2 Views   Final Result   IMPRESSION: Acute impacted, displaced and varus angulated intertrochanteric left proximal femur fracture. No dislocation of the left femoral head from the acetabular socket. Mild bilateral hip joint space narrowing, more pronounced on the right. Diffuse    bone demineralization. No displaced pelvic fracture is identified. Degenerative change lower lumbar spine.      NOTE: ABNORMAL REPORT      THE DICTATION ABOVE DESCRIBES AN ABNORMALITY FOR WHICH FOLLOW-UP IS NEEDED.       CT Cervical Spine w/o Contrast   Final Result   IMPRESSION:   1.  No acute cervical spine fracture.      Head CT w/o contrast   Final Result   IMPRESSION:   1.  No CT evidence for acute intracranial process.   2.  Brain atrophy and presumed chronic microvascular ischemic changes as above.      XR Surgery CHRISTEL Fluoro L/T 5 Min    (Results Pending)       I, Brenda Wang, am serving as a scribe to document services personally performed by Fariba Lynn MD based on my observation and the provider's statements to me. I, Fariba Lynn MD attest that Brenda Wang is acting in a scribe capacity, has observed my performance of the services and has documented them in accordance with my direction.    Fariba Lynn MD  Emergency Medicine  [facility] EMERGENCY DEPARTMENT          Fariba Lynn MD  10/27/24 2110

## 2024-10-27 NOTE — PLAN OF CARE
Orthopedic Note - full consult to follow    Pt. Is an 84 y/o male c/o left hip pain and inability to ambulate after mechanical fall.  X-rays show left hip intertrochanteric fracture.    IMP  Left hip intertrochanteric fracture    REC  Review x-rays  Medical management and preoperative clearance  NPO for left hip nailing tentatively scheduled at 7pm tonight  Further recs to follow    Thank you    Petr Martini MD, DO on 10/27/2024 at 12:44 PM

## 2024-10-27 NOTE — PHARMACY-ADMISSION MEDICATION HISTORY
Pharmacy Intern Admission Medication History    Admission medication history is complete. The information provided in this note is only as accurate as the sources available at the time of the update.    Information Source(s): Patient and CareEverywhere/SureScripts via in-person    Pertinent Information: Patient took lisinopril-hydrochlorothiazide and metoprolol prior to arrival in hospital.     Changes made to PTA medication list:  Added: None  Deleted: fluticasone nasal spray, montelukast, triamcinolone cream  Changed: None    Allergies reviewed with patient and updates made in EHR: yes    Medication History Completed By: Terence HDZ 10/27/2024 12:12 PM    PTA Med List   Medication Sig Last Dose/Taking    cetirizine (ZYRTEC) 10 MG tablet Take 10 mg by mouth daily as needed for allergies. Past Month    lisinopril-hydrochlorothiazide (ZESTORETIC) 20-25 MG tablet Take 1 tablet by mouth once daily 10/27/2024 Morning    metoprolol succinate ER (TOPROL XL) 50 MG 24 hr tablet Take 1 tablet by mouth once daily 10/27/2024 Morning    MULTIVITS-MIN/HRB  (URINOZINC PROSTATE FORMULA ORAL) [MULTIVITS-MIN/HRB  (URINOZINC PROSTATE FORMULA ORAL)] Take by mouth. 10/27/2024 Morning

## 2024-10-27 NOTE — ED NOTES
"[facility] ED Handoff Report    ED Chief Complaint: fall/hip pain    ED Diagnosis:  (S72.142A) Closed displaced intertrochanteric fracture of left femur, initial encounter (H)  (primary encounter diagnosis)  Comment:   Plan: Case Request: INTERNAL FIXATION, FRACTURE,         TROCHANTERIC, HIP, USING PINS OR RODS            (S72.002A) Closed fracture of left hip, initial encounter (H)  Comment:   Plan:        PMH:  History reviewed. No pertinent past medical history.     Code Status:  Full Code     Falls Risk: Yes Band: Applied    Current Living Situation/Residence: lives in a house     Elimination Status: Continent: Yes     Activity Level: Total assist/lift broken hip    Patients Preferred Language:  English     Needed: No    Vital Signs:  /63   Pulse 79   Temp 98  F (36.7  C) (Oral)   Resp 14   Ht 1.88 m (6' 2\")   Wt 90.7 kg (200 lb)   SpO2 91%   BMI 25.68 kg/m       Cardiac Rhythm:     Pain Score: 5/10    Is the Patient Confused:  No    Last Food or Drink: 10/27/24 at 8 am    Focused Assessment:  pt 82 yo alert and oriented male, independent at baseline, broken hip so no WB, last ate at 8am, no thinners, pt reports pain spikes with movement and cares, pt encouraged to use urinal. Wife at bedside.     Tests Performed: Done: Labs and Imaging    Treatments Provided:  see charting     Family Dynamics/Concerns: No    Family Updated On Visitor Policy: Yes    Plan of Care Communicated to Family: Yes    Who Was Updated about Plan of Care: wife at bedside    Belongings Checklist Done and Signed by Patient: No    Belongings Sent with Patient: belongings remain at bedside    Medications sent with patient: NS 75cc    Covid: asymptomatic , n/a    Additional Information: PRN for pain management plz    RN: Kori Rolon RN   10/27/2024 3:18 PM        "

## 2024-10-27 NOTE — CONSULTS
"Care Management Initial Consult    General Information  Assessment completed with: Patient, Spouse or significant other, Family, Reddy and wife Ginger and brother Moreno  Type of CM/SW Visit: Initial Assessment    Primary Care Provider verified and updated as needed: Yes   Readmission within the last 30 days: no previous admission in last 30 days      Reason for Consult: discharge planning  Advance Care Planning: Advance Care Planning Reviewed: no concerns identified (\"I don't have one written out\".)          Communication Assessment  Patient's communication style: spoken language (English or Bilingual)             Cognitive  Cognitive/Neuro/Behavioral: WDL                      Living Environment:   People in home: spouse  Reddy and wife Ginger  Current living Arrangements: house (\"It is a split entry house so I would have to use steps at home\".)      Able to return to prior arrangements: other (see comments) (likely will need TCU.)       Family/Social Support:  Care provided by: self  Provides care for: no one  Marital Status:   Support system: Wife, Sibling(s)  Ginger       Description of Support System: Supportive, Involved    Support Assessment: Adequate family and caregiver support, Adequate social supports, Patient communicates needs well met    Current Resources:   Patient receiving home care services: No        Community Resources: None  Equipment currently used at home: none  Supplies currently used at home: Hearing Aid Batteries, Other (\"hearing aids, partial dentures I rarely use, glasses\")    Employment/Financial:  Employment Status: retired     Employment/ Comments: \"no  history\"  Financial Concerns: none   Referral to Financial Worker: No       Does the patient's insurance plan have a 3 day qualifying hospital stay waiver?  Yes     Which insurance plan 3 day waiver is available? Alternative insurance waiver    Will the waiver be used for post-acute placement? Undetermined at this " "time    Lifestyle & Psychosocial Needs:  Social Drivers of Health     Food Insecurity: Not on file   Depression: Not at risk (2/22/2024)    PHQ-2     PHQ-2 Score: 0   Housing Stability: Not on file   Tobacco Use: Medium Risk (2/22/2024)    Patient History     Smoking Tobacco Use: Former     Smokeless Tobacco Use: Never     Passive Exposure: Not on file   Financial Resource Strain: Not on file   Alcohol Use: Not on file   Transportation Needs: Not on file   Physical Activity: Not on file   Interpersonal Safety: Low Risk  (2/22/2024)    Interpersonal Safety     Do you feel physically and emotionally safe where you currently live?: Yes     Within the past 12 months, have you been hit, slapped, kicked or otherwise physically hurt by someone?: No     Within the past 12 months, have you been humiliated or emotionally abused in other ways by your partner or ex-partner?: No   Stress: Not on file   Social Connections: Not on file   Health Literacy: Not on file       Functional Status:  Prior to admission patient needed assistance:   Dependent ADLs:: Independent, Ambulation-no assistive device  Dependent IADLs:: Independent  Assesssment of Functional Status: Not at baseline with ADL Functioning, Not at baseline with mobility, Not at  functional baseline, Needs placement in a SNF/TCF for rehabilitation    Mental Health Status:  Mental Health Status: No Current Concerns       Chemical Dependency Status:  Chemical Dependency Status: No Current Concerns             Values/Beliefs:  Spiritual, Cultural Beliefs, Anabaptism Practices, Values that affect care: no               Discussed  Partnership in Safe Discharge Planning  document with patient/family: No    Additional Information:  Reddy lives at home with his wife Queta. \"It is a split entry house so I would have to use steps at home\".    Wife to transport at discharge.    CM to follow for medical progression of care, discharge recommendations, and final discharge plan. Writer " "verified patient demographics and updated any changes needed in the patient chart.    Contacts:  Queta wife: cell [phone]. (\"Our land line [phone] isn't working right now\".)    Next Steps:   Plan: Awaiting surgery and PT/OT recommendations, but likely will need TCU.  Needs: TCU referrals sent. \"I want my first choice to be [facility]. Then I am also ok with referrals sent to [facility] and [facility]. I would like to stay in that general area of town, close to our home.\" TCU list given to wife and she will \"continue to look\". Also notified them about using [email or web address].    Ninoska Ace RN    "

## 2024-10-27 NOTE — ANESTHESIA PREPROCEDURE EVALUATION
Anesthesia Pre-Procedure Evaluation    Patient: Reddy Allen   MRN: [id number] : 1940        Procedure : Procedure(s):  INTERNAL FIXATION, FRACTURE, TROCHANTERIC, HIP, USING PINS OR RODS          History reviewed. No pertinent past medical history.   History reviewed. No pertinent surgical history.   Allergies   Allergen Reactions    Levaquin [Levofloxacin] Rash     Now       Social History     Tobacco Use    Smoking status: Former    Smokeless tobacco: Never   Substance Use Topics    Alcohol use: No      Wt Readings from Last 1 Encounters:   10/27/24 95 kg (209 lb 7 oz)        Anesthesia Evaluation   Pt has had prior anesthetic.     History of anesthetic complications  - .  violent wake-ups due to claustrophobia.    ROS/MED HX  ENT/Pulmonary:       Neurologic:  - neg neurologic ROS     Cardiovascular:     (+)  hypertension- -   -  - -                        dysrhythmias (one episode of a-fib.  Sinus rhythm today.), a-fib,             METS/Exercise Tolerance:     Hematologic:  - neg hematologic  ROS     Musculoskeletal: Comment: Left hip fracture      GI/Hepatic:  - neg GI/hepatic ROS     Renal/Genitourinary:  - neg Renal ROS     Endo:  - neg endo ROS     Psychiatric/Substance Use:  - neg psychiatric ROS     Infectious Disease:  - neg infectious disease ROS     Malignancy:  - neg malignancy ROS     Other:            Physical Exam    Airway        Mallampati: II   TM distance: > 3 FB   Neck ROM: full     Respiratory Devices and Support         Dental       (+) Multiple visibly decayed, broken teeth      Cardiovascular   cardiovascular exam normal       Rhythm and rate: regular and normal     Pulmonary   pulmonary exam normal                OUTSIDE LABS:  CBC:   Lab Results   Component Value Date    WBC 8.3 10/27/2024    WBC 5.7 2023    HGB 14.9 10/27/2024    HGB 14.9 2023    HCT 43.9 10/27/2024    HCT 44.2 2023     10/27/2024     2023     BMP:   Lab Results   Component  "Value Date     10/27/2024     02/23/2024    POTASSIUM 3.6 10/27/2024    POTASSIUM 3.7 02/23/2024    CHLORIDE 105 10/27/2024    CHLORIDE 101 02/23/2024    CO2 25 10/27/2024    CO2 27 02/23/2024    BUN 21.3 10/27/2024    BUN 24.3 (H) 02/23/2024    CR 1.24 (H) 10/27/2024    CR 1.28 (H) 02/23/2024     (H) 10/27/2024     (H) 02/23/2024     COAGS: No results found for: \"PTT\", \"INR\", \"FIBR\"  POC: No results found for: \"BGM\", \"HCG\", \"HCGS\"  HEPATIC:   Lab Results   Component Value Date    ALBUMIN 4.2 01/17/2023    PROTTOTAL 7.3 01/17/2023    ALT 16 01/17/2023    AST 23 01/17/2023    ALKPHOS 58 01/17/2023    BILITOTAL 0.8 01/17/2023     OTHER:   Lab Results   Component Value Date    A1C 5.8 (H) 10/27/2024    SUZI 8.9 10/27/2024    MAG 1.6 (L) 10/27/2024    TSH 0.73 01/17/2023       Anesthesia Plan    ASA Status:  2, emergent    NPO Status:  NPO Appropriate    Anesthesia Type: Spinal.              Consents    Anesthesia Plan(s) and associated risks, benefits, and realistic alternatives discussed. Questions answered and patient/representative(s) expressed understanding.     - Discussed: Risks, Benefits and Alternatives for BOTH SEDATION and the PROCEDURE were discussed     - Discussed with:  Patient       - Patient is DNR/DNI Status: No     Use of blood products discussed: Yes.     - Discussed with: Patient.     - Consented: consented to blood products     Postoperative Care       PONV prophylaxis: Ondansetron (or other 5HT-3)     Comments:    Other Comments: Fentanyl in spinal  Fascia [place] block after spinal.  Ok to do at end of case as well.    Precedex- use for wake-up especially    Propofol gtt    No benzodiazepines           Jayne Hart MD    I have reviewed the pertinent notes and labs in the chart from the past 30 days and (re)examined the patient.  Any updates or changes from those notes are reflected in this note.         # Hypomagnesemia: Lowest Mg = 1.6 mg/dL in last 2 days, " "will replace as needed       # Hypertension: Noted on problem list         # Overweight: Estimated body mass index is 26.89 kg/m  as calculated from the following:    Height as of this encounter: 1.88 m (6' 2\").    Weight as of this encounter: 95 kg (209 lb 7 oz).       # Financial/Environmental Concerns: none        "

## 2024-10-27 NOTE — H&P
"[facility]    History and Physical - Hospitalist Service       Date of Admission:  10/27/2024    Assessment & Plan      Reddy Allen is a 83 year old male admitted on 10/27/2024. He has a hx of htn who fell today at  [place] tripping on a stool. He sustained an acute left displaced and varus angulated intertrochanteric left proximal femur fracture.    1.Acute Hip fx left  -bedrest  -pain meds  -npo  -ivf  -Ortho to see  -to OR later today    2.Pre-op eval--would not delay urgent surgery   -EKG now  -stat labs now--not done yet--would check bmp, cbc, mag now  -he does disclose with ayla dunn years ago, woke up from anaesthesia very aggressive and fighting--I personally called anaesthesia now and warned them about this or risk of emergence delirium    3.hx of htn  -clarify meds  -continue metoprolol with parameters  -hold hydrochlorothiazide/lisinopril and re-eval tomorrow , or post op--check lytes now    4.Hx of parox afib in past  -on metoprolol  -not on anticoagulation  -check EKG  -will watch here on tele    5.Hyperglycemia  -likely stress related, told me in past was checked and had normal A1c last year  -will check A1c with strong FH    6.Code-full      Addendum at 1330--labs coming back    Hypomag -stat iv mag now    CKD 2--from chart review   -today creat 1.24 looks to be baseline  -recheck in am  -ivf now npo          Diet:  npo  DVT Prophylaxis: Pneumatic Compression Devices  Leach Catheter: Not present  Lines: None     Cardiac Monitoring: None  Code Status:  full    Clinically Significant Risk Factors Present on Admission                   # Hypertension: Noted on problem list         # Overweight: Estimated body mass index is 25.68 kg/m  as calculated from the following:    Height as of this encounter: 1.88 m (6' 2\").    Weight as of this encounter: 90.7 kg (200 lb).              Disposition Plan     Medically Ready for Discharge: Anticipated in 2-4 Days           Dawn HDZ" MD Rosita  Hospitalist Service  [facility]  Securely message with [employer] (more info)  Text page via [employer] Paging/Directory     ______________________________________________________________________    Chief Complaint   Fall pain in hip    History is obtained from the patient    History of Present Illness     Reddy Allen is a 83 year old male admitted on 10/27/2024. He has a hx of htn who fell today at [facility] tripping on a stool. He notes pain right away in left hip and could not get up.  Prior to fall was in good health, no cp, no sob, no recent illnesses  EMS came and -->ER-->acute left hip fx  He notes last ate 0800 today  He also notes no hx of bleeding , but back 2012 lap mona woke up fighting from anaesthesia         Past Medical History    HTN    Past Surgical History   2012-ayla dunn--woke up fighting anaesthesia    Prior to Admission Medications   Prior to Admission Medications   Prescriptions Last Dose Informant Patient Reported? Taking?   MULTIVITS-MIN/HRB  (URINOZINC PROSTATE FORMULA ORAL) 10/27/2024 Morning  Yes Yes   Sig: [MULTIVITS-MIN/HRB  (URINOZINC PROSTATE FORMULA ORAL)] Take by mouth.   cetirizine (ZYRTEC) 10 MG tablet Past Month  Yes Yes   Sig: Take 10 mg by mouth daily as needed for allergies.   lisinopril-hydrochlorothiazide (ZESTORETIC) 20-25 MG tablet 10/27/2024 Morning  No Yes   Sig: Take 1 tablet by mouth once daily   metoprolol succinate ER (TOPROL XL) 50 MG 24 hr tablet 10/27/2024 Morning  No Yes   Sig: Take 1 tablet by mouth once daily      Facility-Administered Medications: None        Review of Systems    CONSTITUTIONAL:  negative  EYES:  negative  HEENT:  negative  RESPIRATORY:  negative  CARDIOVASCULAR:  htn  GASTROINTESTINAL:  negative  GENITOURINARY:  negative  INTEGUMENT/BREAST:  negative  HEMATOLOGIC/LYMPHATIC:  negative  ALLERGIC/IMMUNOLOGIC:  negative  ENDOCRINE:  negative  MUSCULOSKELETAL:  hip pain  NEUROLOGICAL:   negative  BEHAVIOR/PSYCH:  negative    Social History   I have reviewed this patient's social history and updated it with pertinent information if needed.  Social History     Tobacco Use    Smoking status: Former    Smokeless tobacco: Never   Substance Use Topics    Alcohol use: No    Drug use: No         FH  DM in sibs      Allergies   Allergies   Allergen Reactions    Levaquin [Levofloxacin] Rash     Now         Physical Exam   Vital Signs: Temp: 98  F (36.7  C) Temp src: Oral BP: (!) 143/67 Pulse: 74   Resp: 18 SpO2: 93 %      Weight: 200 lbs 0 oz    Constitutional: awake, alert, cooperative, and no apparent distress  Eyes: sclera clear  ENT: normocephalic, without obvious abnormality  Respiratory: no increased work of breathing, good air exchange, no retractions, and clear to auscultation  Cardiovascular: regular rate and rhythm and normal S1 and S2  GI: normal bowel sounds, soft, non-distended, and non-tender  Skin: no bruising or bleeding  Musculoskeletal: left leg shortened and rotated  Neurologic: Mental Status Exam:  Level of Alertness:   awake  Neuropsychiatric: General: normal, calm, and normal eye contact    Medical Decision Making       75 MINUTES SPENT BY ME on the date of service doing chart review, history, exam, documentation & further activities per the note.      Data         Imaging results reviewed over the past 24 hrs:   Recent Results (from the past 24 hours)   Head CT w/o contrast    Narrative    EXAM: CT HEAD W/O CONTRAST  LOCATION: [facility]  DATE: 10/27/2024    INDICATION: Head injury.  COMPARISON: None.  TECHNIQUE: Routine CT Head without IV contrast. Multiplanar reformats. Dose reduction techniques were used.    FINDINGS:  INTRACRANIAL CONTENTS: No intracranial hemorrhage, extraaxial collection, or mass effect.  No CT evidence of acute infarct. Mild presumed chronic small vessel ischemic changes. Mild generalized volume loss. No hydrocephalus.     VISUALIZED  ORBITS/SINUSES/MASTOIDS: No intraorbital abnormality. No paranasal sinus mucosal disease. No middle ear or mastoid effusion.    BONES/SOFT TISSUES: No acute abnormality.      Impression    IMPRESSION:  1.  No CT evidence for acute intracranial process.  2.  Brain atrophy and presumed chronic microvascular ischemic changes as above.   CT Cervical Spine w/o Contrast    Narrative    EXAM: CT CERVICAL SPINE W/O CONTRAST  LOCATION: [facility]  DATE: 10/27/2024    INDICATION: Neck pain.  COMPARISON: None.  TECHNIQUE: Routine CT Cervical Spine without IV contrast. Multiplanar reformats. Dose reduction techniques were used.    FINDINGS:  VERTEBRA: Normal vertebral body heights and alignment. No fracture or posttraumatic subluxation.     CANAL/FORAMINA: Moderate left neural foraminal stenosis at C3-C4. Severe left neural foraminal stenosis at C4-C5. Moderate bilateral neural foraminal stenosis at C5-C6.    PARASPINAL: No extraspinal abnormality.      Impression    IMPRESSION:  1.  No acute cervical spine fracture.   XR Pelvis and Hip Left 2 Views    Narrative    EXAM: XR PELVIS AND HIP LEFT 2 VIEWS  LOCATION: [facility]  DATE: 10/27/2024    INDICATION: Fall. Left hip pain.  COMPARISON: None.      Impression    IMPRESSION: Acute impacted, displaced and varus angulated intertrochanteric left proximal femur fracture. No dislocation of the left femoral head from the acetabular socket. Mild bilateral hip joint space narrowing, more pronounced on the right. Diffuse   bone demineralization. No displaced pelvic fracture is identified. Degenerative change lower lumbar spine.    NOTE: ABNORMAL REPORT    THE DICTATION ABOVE DESCRIBES AN ABNORMALITY FOR WHICH FOLLOW-UP IS NEEDED.    XR Knee Left 1/2 Views    Narrative    EXAM: XR KNEE LEFT 1/2 VIEWS  LOCATION: [facility]  DATE: 10/27/2024    INDICATION: Fall, abrasion.  COMPARISON: None.      Impression     IMPRESSION: Nonstandard exam. Repeat exam when clinically feasible recommended.    No acute displaced left knee fracture or kristan dislocation is identified. Mild degenerative change left knee. No sizable left knee joint effusion.

## 2024-10-27 NOTE — ED PROVIDER NOTES
EMERGENCY DEPARTMENT ENCOUNTER      NAME: Reddy Allen  AGE: 83 year old male  YOB: 1940  MRN: [id number]  EVALUATION DATE & TIME: 10/27/2024 10:56 AM    PCP: Héctor Nicholson    ED PROVIDER: Trish Marin PA-C      Chief Complaint   Patient presents with    Fall         FINAL IMPRESSION:  1. Closed displaced intertrochanteric fracture of left femur, initial encounter (H)    2. Closed fracture of left hip, initial encounter (H)          ED COURSE & MEDICAL DECISION MAKING:    Pertinent Labs & Imaging studies reviewed. (See chart for details)    83 year old male presents to the Emergency Department for evaluation of a fall.    Physical exam is remarkable for a generally well-appearing elderly male who is in no acute distress.  Heart and lung sounds are clear diffusely throughout.  No focal neurologic deficits, patient moves all extremities.  No midline spinal tenderness or step-offs.  He has a superficial abrasion on the left hip and tenderness to palpation over the lateral left hip, unwilling to range the hip secondary to pain.  There is also a superficial abrasion on his left knee with no associated tenderness to palpation.  He has good distal sensation in the bilateral feet, strong dorsal pedal pulses bilaterally.  Vital signs are stable and he is afebrile.    CT of the head and neck is negative without acute skull fracture, intracranial bleeding, or fracture/dislocation of the cervical spine.  X-rays of the left knee are negative without acute fractures or dislocations.  X-rays of the left hip unfortunately do show an acute impacted and angulated fracture of the intertrochanteric region.    The patient was given Dilaudid with improvement of pain.  I discussed his x-ray findings with orthopedics, they will plan to evaluate the patient and potentially take for surgery later this afternoon.  Patient advised to stay n.p.o.  Patient ultimately admitted to the hospitalist.      Medical Decision  Making  Obtained supplemental history:Supplemental history obtained?: No  Reviewed external records: External records reviewed?: No  Care impacted by chronic illness:Documented in Chart  Care significantly affected by social determinants of health:Access to Medical Care  Did you consider but not order tests?: Work up considered but not performed and documented in chart, if applicable  Did you interpret images independently?: My preliminary independent interpretation of images are xrays of left hip show acute fracture.  See radiology notes for final report.  Consultation discussion with other provider:Did you involve another provider (consultant, , pharmacy, etc.)?: I discussed the care with another health care provider, see documentation for details.  Admit.  Adult Minor Head Trauma:Age 65 years or older      ED Course   11:03 AM Performed my initial history and physical exam. Discussed workup in the emergency department, management of symptoms, and likely disposition.   12:08 PM Staffed with Dr. Fariba Lynn.  12:33 PM Discussed with Dr. Martini with [facility]. ]  12:42 PM Discussed with Dr. Becker who agrees to admit the patient.   12:51 PM Updated patient with NPO status and more pain meds ordered.      At the conclusion of the encounter I discussed the results of all of the tests and the disposition. The questions were answered. The patient or family acknowledged understanding and was agreeable with the care plan.     Voice recognition software was used in the creation of this note. Any grammatical or nonsensical errors are due to inherent errors with the software and are not the intention of the writer.     MEDICATIONS GIVEN IN THE EMERGENCY:  Medications   HYDROmorphone (PF) (DILAUDID) injection 0.5 mg (has no administration in time range)   lidocaine 1 % 0.1-1 mL (has no administration in time range)   lidocaine (LMX4) cream (has no administration in time range)   sodium chloride (PF) 0.9% PF  "flush 3 mL (has no administration in time range)   sodium chloride (PF) 0.9% PF flush 3 mL (has no administration in time range)   senna-docusate (SENOKOT-S/PERICOLACE) 8.6-50 MG per tablet 1 tablet (has no administration in time range)     Or   senna-docusate (SENOKOT-S/PERICOLACE) 8.6-50 MG per tablet 2 tablet (has no administration in time range)   calcium carbonate (TUMS) chewable tablet 1,000 mg (has no administration in time range)   polyethylene glycol (MIRALAX) Packet 17 g (has no administration in time range)   acetaminophen (TYLENOL) tablet 650 mg (has no administration in time range)     Or   acetaminophen (TYLENOL) Suppository 650 mg (has no administration in time range)   sodium chloride 0.9 % infusion (has no administration in time range)   HYDROmorphone (PF) (DILAUDID) injection 0.3 mg (has no administration in time range)   cetirizine (zyrTEC) tablet 10 mg (has no administration in time range)   metoprolol succinate ER (TOPROL XL) 24 hr tablet 50 mg (has no administration in time range)   HYDROmorphone (PF) (DILAUDID) injection 0.5 mg (0.5 mg Intravenous $Given 10/27/24 1122)       NEW PRESCRIPTIONS STARTED AT TODAY'S ER VISIT  New Prescriptions    No medications on file            =================================================================    HPI    Patient information was obtained from: Patient    Use of : N/A         Reddy Allen is a 83 year old male with PMH of paroxysmal Afib, HTN who presents to the ED via walk-in for evaluation of a fall/hip pain.     The patient states that he was at [employer] at about 8:00 this morning walking with a tray, he tripped over a stool that he did not see and fell, landing on his left hip.  He did hit his head but states \"not bad.\"  Since then, he had increasing pain in the left anterior thigh/lateral hip and he is unable to walk on the leg secondary to pain.  He has not yet taken any medications for pain.  He is not anticoagulated. Last PO " intake was 830 this morning.    He denies any headache, visual changes, new back pain, new neck pain, numbness/tingling in the extremities.    REVIEW OF SYSTEMS   Review of Systems   Eyes:  Negative for visual disturbance.   Gastrointestinal:  Negative for nausea and vomiting.   Musculoskeletal:  Positive for arthralgias (Left hip). Negative for back pain and neck pain.   Neurological:  Negative for numbness and headaches.       All other systems reviewed and are negative unless noted in HPI.      PAST MEDICAL HISTORY:  History reviewed. No pertinent past medical history.    PAST SURGICAL HISTORY:  History reviewed. No pertinent surgical history.    CURRENT MEDICATIONS:    cetirizine (ZYRTEC) 10 MG tablet  lisinopril-hydrochlorothiazide (ZESTORETIC) 20-25 MG tablet  metoprolol succinate ER (TOPROL XL) 50 MG 24 hr tablet  MULTIVITS-MIN/HRB  (URINOZINC PROSTATE FORMULA ORAL)        ALLERGIES:  Allergies   Allergen Reactions    Levaquin [Levofloxacin] Rash     Now        FAMILY HISTORY:  No family history on file.    SOCIAL HISTORY:   Social History     Socioeconomic History    Marital status:    Tobacco Use    Smoking status: Former    Smokeless tobacco: Never   Substance and Sexual Activity    Alcohol use: No    Drug use: No     Social Drivers of Health     Interpersonal Safety: Low Risk  (2/22/2024)    Interpersonal Safety     Do you feel physically and emotionally safe where you currently live?: Yes     Within the past 12 months, have you been hit, slapped, kicked or otherwise physically hurt by someone?: No     Within the past 12 months, have you been humiliated or emotionally abused in other ways by your partner or ex-partner?: No       VITALS:  Patient Vitals for the past 24 hrs:   BP Temp Temp src Pulse Resp SpO2 Height Weight   10/27/24 1315 -- -- -- 77 22 97 % -- --   10/27/24 1300 134/67 -- -- 78 -- 94 % -- --   10/27/24 1245 129/59 -- -- 73 -- 94 % -- --   10/27/24 1230 (!) 143/67 -- -- 74 -- 93  "% -- --   10/27/24 1215 127/67 -- -- 74 -- 95 % -- --   10/27/24 1200 131/62 -- -- -- -- -- -- --   10/27/24 1130 -- -- -- 76 -- 97 % -- --   10/27/24 1125 -- -- -- 75 18 96 % -- --   10/27/24 1115 (!) 149/66 -- -- -- -- -- -- --   10/27/24 1100 (!) 158/72 -- -- 81 -- 98 % -- --   10/27/24 1058 -- 98  F (36.7  C) Oral -- 16 -- 1.88 m (6' 2\") 90.7 kg (200 lb)       PHYSICAL EXAM    VITAL SIGNS: /67   Pulse 77   Temp 98  F (36.7  C) (Oral)   Resp 22   Ht 1.88 m (6' 2\")   Wt 90.7 kg (200 lb)   SpO2 97%   BMI 25.68 kg/m    General Appearance: Alert, cooperative, normal speech and facial symmetry, appears stated age, the patient does not appear in distress  Head:  Normocephalic, without obvious abnormality, atraumatic  Eyes: Conjunctiva/corneas clear, EOM's intact, no nystagmus, PERRL  ENT:  Lips, mucosa, and tongue normal; teeth and gums normal, no pharyngeal inflammation, no dysphonia or difficulty swallowing, membranes are moist without pallor  Cardio:  Regular rate and rhythm, S1 and S2 normal, no murmur, rub    or gallop, 2+ pulses symmetric in all extremities  Pulm:  Clear to auscultation bilaterally, respirations unlabored with no accessory muscle use  Abdomen:  Abdomen is soft, non-distended with no tenderness to palpation, rebound tenderness, or guarding.   Back: No midline tenderness or step-offs  Extremities: Superficial abrasion on the left hip and tenderness to palpation over the lateral left hip, unwilling to range the hip secondary to pain.  There is also a superficial abrasion on his left knee with no associated tenderness to palpation.  He has good distal sensation in the bilateral feet, strong dorsal pedal pulses bilaterally; moves all other extremities without difficulty  Neuro: Patient is awake, alert, and responsive to voice. No gross motor weaknesses or sensory loss; moves all extremities.    LAB:  All pertinent labs reviewed and interpreted.  Labs Ordered and Resulted from Time of ED " Arrival to Time of ED Departure - No data to display    RADIOLOGY:  Reviewed all pertinent imaging. Please see official radiology report.  XR Knee Left 1/2 Views   Final Result   IMPRESSION: Nonstandard exam. Repeat exam when clinically feasible recommended.      No acute displaced left knee fracture or kristan dislocation is identified. Mild degenerative change left knee. No sizable left knee joint effusion.      XR Pelvis and Hip Left 2 Views   Final Result   IMPRESSION: Acute impacted, displaced and varus angulated intertrochanteric left proximal femur fracture. No dislocation of the left femoral head from the acetabular socket. Mild bilateral hip joint space narrowing, more pronounced on the right. Diffuse    bone demineralization. No displaced pelvic fracture is identified. Degenerative change lower lumbar spine.      NOTE: ABNORMAL REPORT      THE DICTATION ABOVE DESCRIBES AN ABNORMALITY FOR WHICH FOLLOW-UP IS NEEDED.       CT Cervical Spine w/o Contrast   Final Result   IMPRESSION:   1.  No acute cervical spine fracture.      Head CT w/o contrast   Final Result   IMPRESSION:   1.  No CT evidence for acute intracranial process.   2.  Brain atrophy and presumed chronic microvascular ischemic changes as above.            Trish Marin PA-C  Emergency Medicine  [facility] EMERGENCY DEPARTMENT  [address] [place] [id number] [phone]  Dept: [phone]       Trish Marin PA-C  10/27/24 0416

## 2024-10-27 NOTE — PLAN OF CARE
Goal Outcome Evaluation:      Plan of Care Reviewed With: patient, spouse, sibling          Outcome Evaluation: Likely will need TCU for rehab services.

## 2024-10-28 ENCOUNTER — APPOINTMENT (OUTPATIENT)
Dept: OCCUPATIONAL THERAPY | Facility: HOSPITAL | Age: 84
DRG: 481 | End: 2024-10-28
Attending: PHYSICIAN ASSISTANT
Payer: COMMERCIAL

## 2024-10-28 ENCOUNTER — APPOINTMENT (OUTPATIENT)
Dept: PHYSICAL THERAPY | Facility: HOSPITAL | Age: 84
DRG: 481 | End: 2024-10-28
Attending: PHYSICIAN ASSISTANT
Payer: COMMERCIAL

## 2024-10-28 LAB
ANION GAP SERPL CALCULATED.3IONS-SCNC: 13 MMOL/L (ref 7–15)
ATRIAL RATE - MUSE: 80 BPM
BUN SERPL-MCNC: 24.4 MG/DL (ref 8–23)
CALCIUM SERPL-MCNC: 8.3 MG/DL (ref 8.8–10.4)
CHLORIDE SERPL-SCNC: 102 MMOL/L (ref 98–107)
CREAT SERPL-MCNC: 1.21 MG/DL (ref 0.67–1.17)
DIASTOLIC BLOOD PRESSURE - MUSE: 67 MMHG
EGFRCR SERPLBLD CKD-EPI 2021: 60 ML/MIN/1.73M2
ERYTHROCYTE [DISTWIDTH] IN BLOOD BY AUTOMATED COUNT: 12.5 % (ref 10–15)
GLUCOSE SERPL-MCNC: 129 MG/DL (ref 70–99)
HCO3 SERPL-SCNC: 23 MMOL/L (ref 22–29)
HCT VFR BLD AUTO: 35.2 % (ref 40–53)
HGB BLD-MCNC: 11.8 G/DL (ref 13.3–17.7)
INTERPRETATION ECG - MUSE: NORMAL
MAGNESIUM SERPL-MCNC: 1.8 MG/DL (ref 1.7–2.3)
MCH RBC QN AUTO: 30.3 PG (ref 26.5–33)
MCHC RBC AUTO-ENTMCNC: 33.5 G/DL (ref 31.5–36.5)
MCV RBC AUTO: 91 FL (ref 78–100)
P AXIS - MUSE: NORMAL DEGREES
PLATELET # BLD AUTO: 188 10E3/UL (ref 150–450)
POTASSIUM SERPL-SCNC: 3.7 MMOL/L (ref 3.4–5.3)
PR INTERVAL - MUSE: NORMAL MS
QRS DURATION - MUSE: 98 MS
QT - MUSE: 390 MS
QTC - MUSE: 444 MS
R AXIS - MUSE: 58 DEGREES
RBC # BLD AUTO: 3.89 10E6/UL (ref 4.4–5.9)
SODIUM SERPL-SCNC: 138 MMOL/L (ref 135–145)
SYSTOLIC BLOOD PRESSURE - MUSE: 134 MMHG
T AXIS - MUSE: 39 DEGREES
VENTRICULAR RATE- MUSE: 78 BPM
WBC # BLD AUTO: 14.4 10E3/UL (ref 4–11)

## 2024-10-28 PROCEDURE — 97166 OT EVAL MOD COMPLEX 45 MIN: CPT | Mod: GO

## 2024-10-28 PROCEDURE — 250N000011 HC RX IP 250 OP 636: Performed by: PHYSICIAN ASSISTANT

## 2024-10-28 PROCEDURE — 120N000001 HC R&B MED SURG/OB

## 2024-10-28 PROCEDURE — 36415 COLL VENOUS BLD VENIPUNCTURE: CPT | Performed by: INTERNAL MEDICINE

## 2024-10-28 PROCEDURE — 97116 GAIT TRAINING THERAPY: CPT | Mod: GP

## 2024-10-28 PROCEDURE — 250N000013 HC RX MED GY IP 250 OP 250 PS 637: Performed by: INTERNAL MEDICINE

## 2024-10-28 PROCEDURE — 97535 SELF CARE MNGMENT TRAINING: CPT | Mod: GO

## 2024-10-28 PROCEDURE — 83735 ASSAY OF MAGNESIUM: CPT | Performed by: INTERNAL MEDICINE

## 2024-10-28 PROCEDURE — 85027 COMPLETE CBC AUTOMATED: CPT | Performed by: INTERNAL MEDICINE

## 2024-10-28 PROCEDURE — 97530 THERAPEUTIC ACTIVITIES: CPT | Mod: GP

## 2024-10-28 PROCEDURE — 97162 PT EVAL MOD COMPLEX 30 MIN: CPT | Mod: GP

## 2024-10-28 PROCEDURE — 80048 BASIC METABOLIC PNL TOTAL CA: CPT | Performed by: INTERNAL MEDICINE

## 2024-10-28 PROCEDURE — 250N000013 HC RX MED GY IP 250 OP 250 PS 637: Performed by: PHYSICIAN ASSISTANT

## 2024-10-28 PROCEDURE — 99232 SBSQ HOSP IP/OBS MODERATE 35: CPT | Performed by: INTERNAL MEDICINE

## 2024-10-28 RX ADMIN — ACETAMINOPHEN 975 MG: 325 TABLET ORAL at 14:14

## 2024-10-28 RX ADMIN — OXYCODONE HYDROCHLORIDE 5 MG: 5 TABLET ORAL at 09:19

## 2024-10-28 RX ADMIN — PROCHLORPERAZINE EDISYLATE 5 MG: 5 INJECTION INTRAMUSCULAR; INTRAVENOUS at 00:22

## 2024-10-28 RX ADMIN — ACETAMINOPHEN 975 MG: 325 TABLET ORAL at 05:55

## 2024-10-28 RX ADMIN — OXYCODONE HYDROCHLORIDE 5 MG: 5 TABLET ORAL at 21:49

## 2024-10-28 RX ADMIN — SENNOSIDES AND DOCUSATE SODIUM 1 TABLET: 8.6; 5 TABLET ORAL at 08:00

## 2024-10-28 RX ADMIN — POLYETHYLENE GLYCOL 3350 17 G: 17 POWDER, FOR SOLUTION ORAL at 08:00

## 2024-10-28 RX ADMIN — ACETAMINOPHEN 975 MG: 325 TABLET ORAL at 21:34

## 2024-10-28 RX ADMIN — SENNOSIDES AND DOCUSATE SODIUM 1 TABLET: 8.6; 5 TABLET ORAL at 21:35

## 2024-10-28 RX ADMIN — CEFAZOLIN SODIUM 2 G: 2 INJECTION, SOLUTION INTRAVENOUS at 11:09

## 2024-10-28 RX ADMIN — ASPIRIN 81 MG: 81 TABLET, COATED ORAL at 21:35

## 2024-10-28 RX ADMIN — CEFAZOLIN SODIUM 2 G: 2 INJECTION, SOLUTION INTRAVENOUS at 05:54

## 2024-10-28 RX ADMIN — ASPIRIN 81 MG: 81 TABLET, COATED ORAL at 08:00

## 2024-10-28 RX ADMIN — METOPROLOL SUCCINATE 50 MG: 50 TABLET, EXTENDED RELEASE ORAL at 07:59

## 2024-10-28 NOTE — CONSULTS
"Care Management Follow Up    Length of Stay (days): 1    Expected Discharge Date: 10/30/2024    Anticipated Discharge Plan:  Transitional Care    Transportation: TBD    PT Recommendations: Transitional Care Facility  OT Recommendations:  Transitional Care Facility     Barriers to Discharge: medical stability    Prior Living Situation: house (\"It is a split entry house so I would have to use steps at home\".) with spouse    Discussed  Partnership in Safe Discharge Planning  document with patient/family: No     Handoff Completed: No, handoff not indicated or clinically appropriate    Patient/Spokesperson Updated: No    Additional Information:  See below     Next Steps: continue to follow     Zuleima Choudhury RN        TCU referrals sent by previous care manager. Will need [employer] auth. Will send for auth once therapy recs are in   "

## 2024-10-28 NOTE — PLAN OF CARE
Problem: Adult Inpatient Plan of Care  Goal: Plan of Care Review  Description: The Plan of Care Review/Shift note should be completed every shift.  The Outcome Evaluation is a brief statement about your assessment that the patient is improving, declining, or no change.  This information will be displayed automatically on your shift  note.  Outcome: Progressing   Goal Outcome Evaluation:                    Pt. Alert and oriented. Denied pain. VSS. RA. LR infusing. Continue with POC.

## 2024-10-28 NOTE — PLAN OF CARE
Problem: Pain Acute  Goal: Optimal Pain Control and Function  Outcome: Progressing  Intervention: Prevent or Manage Pain  Recent Flowsheet Documentation  Taken 10/28/2024 0030 by Paz Simpson RN  Medication Review/Management: medications reviewed     Problem: Surgery Nonspecified  Goal: Absence of Bleeding  Outcome: Progressing  Goal: Effective Bowel Elimination  Outcome: Progressing  Goal: Fluid and Electrolyte Balance  Outcome: Progressing  Goal: Blood Glucose Level Within Targeted Range  Outcome: Progressing  Goal: Absence of Infection Signs and Symptoms  Outcome: Progressing  Goal: Anesthesia/Sedation Recovery  Outcome: Progressing  Intervention: Optimize Anesthesia Recovery  Recent Flowsheet Documentation  Taken 10/28/2024 0030 by Paz Simpson, RN  Safety Promotion/Fall Prevention: activity supervised  Goal: Optimal Pain Control and Function  Outcome: Progressing  Goal: Nausea and Vomiting Relief  Outcome: Progressing  Goal: Effective Urinary Elimination  Outcome: Progressing  Goal: Effective Oxygenation and Ventilation  Outcome: Progressing  Intervention: Optimize Oxygenation and Ventilation  Recent Flowsheet Documentation  Taken 10/28/2024 0030 by Paz Simpson, RN  Activity Management: ambulated in room   Goal Outcome Evaluation:     Pt A/O x 4. Rates left thigh pain 2/10. Denies numbness and tingling. VSS. C/o nausea. Relieved with compazine. Drinking well. Stood at bedside and ambulated in room with walker and one assist. Left hip and thigh drsgs D/I with small amount bloody drng. VSS. Leach catheter dc'd

## 2024-10-28 NOTE — CONSULTS
ORTHOPEDIC CONSULTATION    CHIEF COMPLAINT: left hip pain      HISTORY OF PRESENT ILLNESS:  The patient is seen in orthopedic consultation at the request of Dawn Jeronimo MD.  The patient is a 83 year old male with c/o left hip pain.  Tripped over an ottoman at [employer] this morning.  Hip pain and inability to ambulate.      PAST MEDICAL HISTORY:   History reviewed. No pertinent past medical history.    ALLERGIES:      Allergies   Allergen Reactions    Levaquin [Levofloxacin] Rash     Now         MEDICATIONS ON ADMISSION:  Medications were reviewed.  They do include:   Medications Prior to Admission   Medication Sig Dispense Refill Last Dose/Taking    cetirizine (ZYRTEC) 10 MG tablet Take 10 mg by mouth daily as needed for allergies.   Past Month    lisinopril-hydrochlorothiazide (ZESTORETIC) 20-25 MG tablet Take 1 tablet by mouth once daily 90 tablet 0 10/27/2024 Morning    metoprolol succinate ER (TOPROL XL) 50 MG 24 hr tablet Take 1 tablet by mouth once daily 90 tablet 2 10/27/2024 Morning    MULTIVITS-MIN/HRB  (URINOZINC PROSTATE FORMULA ORAL) [MULTIVITS-MIN/HRB  (URINOZINC PROSTATE FORMULA ORAL)] Take by mouth.   10/27/2024 Morning       SOCIAL HISTORY:   Social History     Tobacco Use    Smoking status: Former    Smokeless tobacco: Never   Substance Use Topics    Alcohol use: No    Drug use: No        FAMILY HISTORY:  No family history on file.    REVIEW OF SYSTEMS:   See Admission History and Physical.  Negative except for left hip complaints    PHYSICAL EXAMINATION:    Temp:  [97.9  F (36.6  C)-98.2  F (36.8  C)] 97.9  F (36.6  C)  Pulse:  [73-96] 83  Resp:  [11-22] 18  BP: (126-158)/(59-73) 156/73  SpO2:  [90 %-99 %] 99 %    General: On examination, the patient is A&Ox3  SKIN/HAIR/NAILS: normal   Pulses:  Dorsalis pedis pulses intact  Sensation: intact bilateral lower extremities  Tenderness: to palpation about the left hip girdle  ROM: n/a  Crepitus: none  Pain with ROM:  n/a  Instability: n/a  Motor: moves foot and ankle.  CMS intact  Contralateral side= Full range of motion, Negative joint instability findings, 5/5 motor groups about the joint, Non-tender.     RADIOGRAPHIC EVALUATION:  EXAM: XR PELVIS AND HIP LEFT 2 VIEWS  LOCATION: [facility]  DATE: 10/27/2024     INDICATION: Fall. Left hip pain.  COMPARISON: None.                                                                      IMPRESSION: Acute impacted, displaced and varus angulated intertrochanteric left proximal femur fracture. No dislocation of the left femoral head from the acetabular socket. Mild bilateral hip joint space narrowing, more pronounced on the right. Diffuse   bone demineralization. No displaced pelvic fracture is identified. Degenerative change lower lumbar spine.      LABORATORY DATA:   WBC 8.3,Hb 14.9,     IMPRESSION:  Left hip intertrochanteric fracture, closed     PLAN:  Review x-rays  Orthopedic exam  Preoperative clearance  NPO for left hip nail today  Further recs to follow    Thank you    Petr Martini MD, DO on 10/27/2024 at 7:05 PM      Petr Martini MD, DO

## 2024-10-28 NOTE — OP NOTE
Operative Note    Name:  Reddy Allen  :  1940  MRN:  [id number]  Procedure Date:  10/27/2024      PREOPERATIVE DIAGNOSIS  Left hip intertrochanteric fracture.      POSTOPERATIVE DIAGNOSIS  Same    PROCEDURE  Left hip intramedullary rodding.    SURGEON:  Pert Martini MD, DO MIVET.    ASSISTANTS:    Sabrina Paula PA-C assistance was required due to the complexity of the procedure, for patient positioning, instrumentation assistance, soft tissue retraction and patient safety.    Circulator: Vanessa Bliss RN  Scrub Person: Mariah Kearns  X-Ray Technologist: Héctor Carr ARRT    ANESTHESIA  Spinal    Estimated Blood Loss  : 50 mL    Specimens: none     Drains: none     Complications: none     Disposition:  Stable and awake to postanesthesia recovery..    INDICATION FOR OPERATION   The patient is an 83 year old male  with history of a hip fracture. X-rays showed evidence of a intertrochanteric fracture.  It was recommended he undergo IM rodding of the hip fracture. Risks and benefits of planned procedure as well as details of surgery discussed with patient. Consent was obtained.     REPORT OF OPERATION   The patient is brought to the operating room and placed supine on the operating table. After induction of general anesthesia he was placed on the fracture table in supine position. The hip was prepped and draped in the normal sterile fashion.  he was given appropriate antibiotics preoperatively. Standard AP and lateral C-arm views were used throughout the case.    A standard incision was made superior to the greater troch and carried through the skin and subcutaneous tissue and fascia. The appropriate starting point is identified at the tip of the troch and a curved awl is placed followed by placement of a long guide wire. This is over reamed with a proximal reamer. The eddie was then placed down the femoral canal with AP and lateral C-arm guidance. This is impacted at appropriate level and  second incision is made along the femoral shaft. The guide pin is then placed up the femoral neck in appropriate position, measured and lateral cortex is broached with a drill. The proximal screw is then advanced into place in appropriate position and the proximal locking mechanism is engaged.       Next the distal screw is placed in appropriate position. All instruments were removed and the incisions irrigated with copious saline irrigation and closed in layers with 0-Vicryl deep closure, 2-0 Vicryl subcutaneous and skin staples. Sterile dressings placed on the hip and the patient is then transferred in stable awake condition to postanesthesia recovery.       Petr Martini MD, DO, M.D.   Date: 10/27/2024  Time: 8:46 PM    Implants:  Implant Name Type Inv. Item Serial No.  Lot No. LRB No. Used Action   IMP NAIL FEMORAL SYN TFN [id number] [id number] 04.037.012S - [id number] Metallic Hardware/[place] IMP NAIL FEMORAL SYN TFN [id number] [id number] 04.037.012S  [employer]  Left 1 Implanted   IMP SCR SYN TFNA FENESTRATED LAG 100MM 04.038.200S - [id number] Metallic Hardware/[place] IMP SCR SYN TFNA FENESTRATED LAG 100MM 04.038.200S  [email or web address] [id number] Left 1 Implanted   SCREW BN 40MM 5MM LCK X25 STRL IM NL 04.045.040S - [id number] Metallic Hardware/[place] SCREW BN 40MM 5MM LCK X25 STRL IM NL 04.045.040S  [employer] [id number] Left 1 Implanted

## 2024-10-28 NOTE — PROGRESS NOTES
"[facility]    Medicine Progress Note - Hospitalist Service    Date of Admission:  10/27/2024    Assessment & Plan      cheyenne Allen is a 83 year old male admitted on 10/27/2024. He has a hx of htn who fell today at  [employer] tripping on a stool. He sustained an acute left displaced and varus angulated intertrochanteric left proximal femur fracture.     1.Acute Hip fx left  -S/p ORIF on 10/27   ---    Acute blood loss anemia  Hemoglobin   Date Value Ref Range Status   10/28/2024 11.8 (L) 13.3 - 17.7 g/dL Final   Transfusion needed      3 essential hypertension controlled  Continue to hold hydrochlorothiazide/lisinopril   -- Continue metoprolol    4.Hx of parox afib in past  -on metoprolol  -not on anticoagulation  --DC telemetry and pulse oximetry     5.Hyperglycemia  -likely stress related, told me in past was checked and had normal A1c last year  Lab Results   Component Value Date    A1C 5.8 10/27/2024    A1C 5.4 01/17/2023          6.Code-full           Diet: Advance Diet as Tolerated: Regular Diet Adult    DVT Prophylaxis: Defer to the orthopedics.  Currently on aspirin  Leach Catheter: Not present  Lines: None     Cardiac Monitoring: None  Code Status: Full Code      Clinically Significant Risk Factors Present on Admission             # Hypomagnesemia: Lowest Mg = 1.6 mg/dL in last 2 days, will replace as needed       # Hypertension: Noted on problem list         # Overweight: Estimated body mass index is 26.89 kg/m  as calculated from the following:    Height as of this encounter: 1.88 m (6' 2\").    Weight as of this encounter: 95 kg (209 lb 7 oz).       # Financial/Environmental Concerns: none         Disposition Plan     Medically Ready for Discharge: Anticipated in 2-4 Days             Bharat Small MD  Hospitalist Service  [facility]  Securely message with [employer] (more info)  Text page via [employer] Paging/Directory "   ______________________________________________________________________    Interval History   Patient new to me.  Chart reviewed.  S/p ORIF of the left hip.  Denies lightheadedness dizziness.  He has not walked in the hospital.    Physical Exam   Vital Signs: Temp: 97.9  F (36.6  C) Temp src: Oral BP: 133/61 Pulse: 74   Resp: 16 SpO2: 94 % O2 Device: None (Room air) Oxygen Delivery: 8 LPM  Weight: 209 lbs 6.99 oz    No apparent distress.  Hearing impaired.  Uses hearing aids.      Medical Decision Making       35 MINUTES SPENT BY ME on the date of service doing chart review, history, exam, documentation & further activities per the note.      Data     I have personally reviewed the following data over the past 24 hrs:    14.4 (H)  \   11.8 (L)   / 188     138 102 24.4 (H) /  129 (H)   3.7 23 1.21 (H) \     TSH: N/A T4: N/A A1C: N/A

## 2024-10-28 NOTE — ANESTHESIA CARE TRANSFER NOTE
Patient: Reddy Allen    Procedure: Procedure(s):  INTERNAL FIXATION, FRACTURE, TROCHANTERIC, HIP, USING TRACY       Diagnosis: Closed displaced intertrochanteric fracture of left femur, initial encounter (H) [S72.142A]  Diagnosis Additional Information: No value filed.    Anesthesia Type:   Spinal     Note:    Oropharynx: oropharynx clear of all foreign objects  Level of Consciousness: drowsy  Oxygen Supplementation: face mask  Level of Supplemental Oxygen (L/min / FiO2): 8  Independent Airway: airway patency satisfactory and stable  Dentition: dentition unchanged  Vital Signs Stable: post-procedure vital signs reviewed and stable  Report to RN Given: handoff report given  Patient transferred to: PACU    Handoff Report: Identifed the Patient, Identified the Reponsible Provider, Reviewed the pertinent medical history, Discussed the surgical course, Reviewed Intra-OP anesthesia mangement and issues during anesthesia, Set expectations for post-procedure period and Allowed opportunity for questions and acknowledgement of understanding      Vitals:  Vitals Value Taken Time   /50 10/27/24 2109   Temp     Pulse 69 10/27/24 2110   Resp 11 10/27/24 2110   SpO2 97 % 10/27/24 2110   Vitals shown include unfiled device data.    Electronically Signed By: KLAUDIA Carpenter CRNA  October 27, 2024  9:12 PM

## 2024-10-28 NOTE — OR NURSING
Dr. Hart called regarding BP 70-80 systolic.  Pt asymptomatic.  Remains alert and talkative.  States he feels very goo.  500cc albumin given.

## 2024-10-28 NOTE — ANESTHESIA PROCEDURE NOTES
"Intrathecal injection Procedure Note    Pre-Procedure   Staff -        Anesthesiologist:  Jayne Hart MD       Performed By: anesthesiologist       Location: OR       Procedure Start/Stop Times: 10/27/2024 7:49 PM and 10/27/2024 7:53 PM       Pre-Anesthestic Checklist: patient identified, IV checked, risks and benefits discussed, informed consent, monitors and equipment checked, pre-op evaluation, at physician/surgeon's request and post-op pain management  Timeout:       Correct Patient: Yes        Correct Procedure: Yes        Correct Site: Yes        Correct Position: Yes   Procedure Documentation  Procedure: intrathecal injection       Diagnosis: hip fracture       Patient Position: RLD       Skin prep: Chloraprep       Insertion Site: L3-4. (midline approach).       Needle Gauge: 25.        Needle Length (Inches): 3.5        Spinal Needle Type: Minerva tip       Introducer used       Introducer: 20 G       # of attempts: 1 and  # of redirects:  0    Assessment/Narrative         Paresthesias: No.       Sensory Level: T10       CSF fluid: clear.    Medication(s) Administered   0.75% Hyperbaric Bupivacaine (Intrathecal) - Intrathecal   1.6 mL - 10/27/2024 7:49:00 PM  Medication Administration Time: 10/27/2024 7:49 PM     Comments:  Patient tolerated well and without complication.     Jayne Hart MD       FOR [facility] ([facility]) ONLY:   Pain Team Contact information: please page the Pain Team Via [employer]. Search \"Pain\". During daytime hours, please page the attending first. At night please page the resident first.      "

## 2024-10-28 NOTE — PROGRESS NOTES
"PT Evaluation   10/28/24 1005   Appointment Info   Signing Clinician's Name / Credentials (PT) Ksenia Rice PT, DPT   Living Environment   People in Home spouse   Current Living Arrangements house  (split level home)   Home Accessibility stairs to enter home   Number of Stairs, Main Entrance 3   Stair Railings, Main Entrance railings on both sides of stairs  (can only reach 1 railling at a time d/t width of stairs)   Number of Stairs, Within Home, Primary six;seven   Stair Railings, Within Home, Primary railings safe and in good condition   Living Environment Comments Patient lives in a split level home. However, patient reports if he walks through the garage, he would only have 3 JAMES with B handrails and then he is already on the level with his bed/bathroom. \"man cave\" in basement,  which he states he does not need to go down to.   Self-Care   Usual Activity Tolerance good   Current Activity Tolerance moderate   Equipment Currently Used at Home none   Fall history within last six months yes   Number of times patient has fallen within last six months 1   Activity/Exercise/Self-Care Comment IND with ADLs. No grab bars or shower chair.   General Information   Onset of Illness/Injury or Date of Surgery 10/27/24   Referring Physician Sabrina Paula PA-C   Patient/Family Therapy Goals Statement (PT) None stated   Pertinent History of Current Problem (include personal factors and/or comorbidities that impact the POC) Per chart: \"3 year old male admitted on 10/27/2024. He has a hx of htn who fell today at [facility] tripping on a stool. He sustained an acute left displaced and varus angulated intertrochanteric left proximal femur fracture.\"   Existing Precautions/Restrictions fall;weight bearing   Weight-Bearing Status - LLE weight-bearing as tolerated   Cognition   Affect/Mental Status (Cognition) WNL   Orientation Status (Cognition) oriented x 4   Follows Commands (Cognition) WNL   Pain Assessment   Patient Currently " in Pain Yes, see Vital Sign flowsheet  ([place] through amb, pt reports 6/10 L hip pain)   Range of Motion (ROM)   Range of Motion ROM deficits secondary to pain;ROM deficits secondary to weakness   ROM Comment difficulty with L hip flexion d/t L hip pain/weakness   Strength (Manual Muscle Testing)   Strength (Manual Muscle Testing) Deficits observed during functional mobility   Bed Mobility   Bed Mobility supine-sit   Supine-Sit [place] (Bed Mobility) supervision;verbal cues;nonverbal cues (demo/gesture)   Bed Mobility Limitations decreased ability to use legs for bridging/pushing   Impairments Contributing to Impaired Bed Mobility decreased strength   Assistive Device (Bed Mobility) bed rails   Comment, (Bed Mobility) raised HOB. Effortful, slow pace d/t pain & weakness in LLE   Transfers   Transfers sit-stand transfer   Transfer Safety Concerns Noted losing balance backward;decreased balance during turns;decreased step length   Impairments Contributing to Impaired Transfers impaired balance;decreased strength;pain   Sit-Stand Transfer   Sit-Stand [place] (Transfers) contact guard;1 person assist   Assistive Device (Sit-Stand Transfers) walker, front-wheeled   Gait/Stairs (Locomotion)   [place] Level (Gait) contact guard;verbal cues;nonverbal cues (demo/gesture)   Assistive Device (Gait) walker, front-wheeled   Distance in Feet (Gait) 10'   Pattern (Gait) step-to   Deviations/Abnormal Patterns (Gait) antalgic;base of support, narrow;georgina decreased;gait speed decreased;stride length decreased;weight shifting decreased   Balance   Balance other (describe)   Balance Comments CGA with FWW for static standing & amb. Does not use AD at baseline.   Clinical Impression   Criteria for Skilled Therapeutic Intervention Yes, treatment indicated   PT Diagnosis (PT) Impaired functional mobility   Influenced by the following impairments Impaired strength, balance, activity tolerance; pain   Functional  limitations due to impairments Bed mobility, transfers, gait, endurance   Clinical Presentation (PT Evaluation Complexity) evolving   Clinical Presentation Rationale Clinical judgment   Clinical Decision Making (Complexity) moderate complexity   Planned Therapy Interventions (PT) balance training;bed mobility training;gait training;home exercise program;neuromuscular re-education;patient/family education;stair training;strengthening;stretching;transfer training;progressive activity/exercise;home program guidelines   Risk & Benefits of therapy have been explained evaluation/treatment results reviewed;participants included;patient;participants voiced agreement with care plan   PT Total Evaluation Time   PT Eval, Moderate Complexity Minutes (29524) 10   Physical Therapy Goals   PT Frequency Daily   PT Predicted Duration/Target Date for Goal Attainment 11/04/24   PT Goals Bed Mobility;Transfers;Gait;Stairs   PT: Bed Mobility Independent;Supine to/from sit  (flat HOB)   PT: Transfers Sit to/from stand;Bed to/from chair;Assistive device;Modified independent   PT: Gait Supervision/stand-by assist;150 feet;Rolling walker   PT: Stairs Supervision/stand-by assist;3 stairs;Rail on both sides  (can only reach 1 rail at a time at home)   Interventions   Interventions Quick Adds Gait Training;Therapeutic Activity   Therapeutic Activity   Therapeutic Activities: dynamic activities to improve functional performance Minutes (05788) 10   Symptoms Noted During/After Treatment Fatigue;Increased pain   Treatment Detail/Skilled Intervention additional sit/stands recliner/eob<>FWW with maximum cueing for BLE & BUE placement/safety/technique CGA. Advised pt to sit up in chair after session to improve sitting tolerance. Extended time discussing discharge recommendation to TCU at this time to improve functional mobility, activity tolerance, and safety with stairs. Patient receptive and agreeable to plan. Left sitting up in recliner with call  "light & all other needs in reach.   Gait Training   Gait Training Minutes (03057) 26   Symptoms Noted During/After Treatment (Gait Training) fatigue;increased pain   Treatment Detail/Skilled Intervention additional 120' with FWW CGA, pt ambs at slow, effortful antalgic pace - verbal and visual cues for gait mechanics to further decrease load on L hip for comfort. Stair navigation training using step stool x6 reps asc/desc, \"up with the good, down with the bad\" - use of LUE on bedrail for support and RUE on FWW with PT holding FWW steady - completes with effort but safely with use of BUE support with Peter for balance. Trialed use of 1 rail to simulate home setup, pt having moderate LOB requiring modA to maintain balance - patient unable to safely navigate for home yet.   Distance in Feet 120'   [place] Level (Gait Training) contact guard   Physical Assistance Level (Gait Training) verbal cues;nonverbal cues (demo/gestures);1 person assist   Weight Bearing (Gait Training) weight-bearing as tolerated   Assistive Device (Gait Training) rolling walker  (fww)   Pattern Analysis (Gait Training) swing-to gait   Gait Analysis Deviations decreased georgina;increased time in double stance;decreased weight-shifting ability;decreased toe-to-floor clearance;decreased step length;decreased stride length;decreased velocity of limb motion   Impairments (Gait Analysis/Training) balance impaired;strength decreased;pain   Stair Railings present at both sides   Physical Assist/Nonphysical Assist (Stairs) 1 person assist;verbal cues;nonverbal cues (demo/gestures)   PT Discharge Planning   PT Plan Prog gait FWW, standing therex, sit<>stands, stair practice   PT Discharge Recommendation (DC Rec) Transitional Care Facility   PT Rationale for DC Rec Patient unable to navigate stairs safely at this time yet. Patient could potentially progress while in hospital. At this time recommend TCU at discharge.   PT Brief overview of current status " ' FWW, Peter stairs with B handrails   PT Equipment Needed at Discharge walker, rolling  (fww)   Physical Therapy Time and Intention   Timed Code Treatment Minutes 36   Total Session Time (sum of timed and untimed services) 46

## 2024-10-28 NOTE — PROGRESS NOTES
"Occupational Therapy      10/28/24 1116   Appointment Info   Signing Clinician's Name / Credentials (OT) ALICE Aleman   Living Environment   People in Home spouse   Current Living Arrangements house   Home Accessibility stairs to enter home;stairs within home   Number of Stairs, Main Entrance 3   Stair Railings, Main Entrance railings on both sides of stairs   Number of Stairs, Within Home, Primary six;seven   Stair Railings, Within Home, Primary railings safe and in good condition   Transportation Anticipated family or friend will provide   Living Environment Comments Patient lives in a split level home. Pt does not have grab bars or shower chair.   Self-Care   Usual Activity Tolerance good   Current Activity Tolerance moderate   Equipment Currently Used at Home none   Fall history within last six months yes   Number of times patient has fallen within last six months 1   Activity/Exercise/Self-Care Comment Pt reports being ind w/ ADLs at baseline   Instrumental Activities of Daily Living (IADL)   IADL Comments Pt reports being ind w/ IADLs at baseline - drives   General Information   Onset of Illness/Injury or Date of Surgery 10/27/24   Referring Physician Sabrina Paula PA-C   Patient/Family Therapy Goal Statement (OT) Return home   Additional Occupational Profile Info/Pertinent History of Current Problem Per chart review, \"83 year old male admitted on 10/27/2024. He has a hx of htn who fell today at  [place] tripping on a stool. He sustained an acute left displaced and varus angulated intertrochanteric left proximal femur fracture.\"   Existing Precautions/Restrictions fall;weight bearing   Left Lower Extremity (Weight-bearing Status) weight-bearing as tolerated (WBAT)   Cognitive Status Examination   Cognitive Status Comments A&Ox4   Pain Assessment   Patient Currently in Pain Yes, see Vital Sign flowsheet   Range of Motion Comprehensive   General Range of Motion no range of motion deficits identified "   Strength Comprehensive (MMT)   General Manual Muscle Testing (MMT) Assessment no strength deficits identified   Muscle Tone Assessment   Muscle Tone Quick Adds No deficits were identified   Transfers   Transfers sit-stand transfer;toilet transfer   Sit-Stand Transfer   Sit-Stand [place] (Transfers) moderate assist (50% patient effort);verbal cues   Assistive Device (Sit-Stand Transfers) walker, front-wheeled   Toilet Transfer   Type (Toilet Transfer) sit-stand;stand-sit   [place] Level (Toilet Transfer) minimum assist (75% patient effort);verbal cues   Assistive Device (Toilet Transfer) grab bars/safety frame;walker, front-wheeled   Balance   Balance Assessment no deficits identified   Activities of Daily Living   BADL Assessment/Intervention lower body dressing;toileting;grooming   Lower Body Dressing Assessment/Training   Comment, (Lower Body Dressing) Per clinical reasoning, anticipated pt may have difficulty w/ LB dressing tasks d/t post-surgical pain/stiffness/decreased range   [place] Level (Lower Body Dressing) not tested   Grooming Assessment/Training   Position (Grooming) sink side;unsupported standing   [place] Level (Grooming) grooming skills;oral care regimen;supervision;contact guard assist   Toileting   Position (Toileting) unsupported sitting   Assistive Devices (Toileting) grab bar, toilet   [place] Level (Toileting) toileting skills;adjust/manage clothing;perform perineal hygiene;supervision;contact guard assist   Clinical Impression   Criteria for Skilled Therapeutic Interventions Met (OT) Yes, treatment indicated   OT Diagnosis Decreased activity tolerance, decreased safety and ind w/ ADLs/IADLs   Influenced by the following impairments Pain d/t closed fracture of left hip   OT Problem List-Impairments impacting ADL problems related to;activity tolerance impaired;mobility;pain   Assessment of Occupational Performance 3-5 Performance Deficits   Identified Performance  Deficits toileting, LB dressing, trsfs, mobility   Planned Therapy Interventions (OT) ADL retraining;transfer training;progressive activity/exercise   Clinical Decision Making Complexity (OT) detailed assessment/moderate complexity   Risk & Benefits of therapy have been explained evaluation/treatment results reviewed;care plan/treatment goals reviewed;risks/benefits reviewed;current/potential barriers reviewed;participants voiced agreement with care plan;participants included;patient   OT Total Evaluation Time   OT Eval, Moderate Complexity Minutes (06489) 15   OT Goals   Therapy Frequency (OT) 6 times/week   OT Predicted Duration/Target Date for Goal Attainment 11/04/24   OT Goals Hygiene/Grooming;Lower Body Dressing;Transfers;Toilet Transfer/Toileting   OT: Hygiene/Grooming modified independent;while standing   OT: Lower Body Dressing Modified independent;using adaptive equipment;including set-up/clothing retrieval   OT: Transfer Modified independent;with assistive device   OT: Toilet Transfer/Toileting Modified independent;toilet transfer;cleaning and garment management;using adaptive equipment   Self-Care/Home Management   Self-Care/Home Mgmt/ADL, Compensatory, Meal Prep Minutes (08976) 10   Symptoms Noted During/After Treatment (Meal Preparation/Planning Training) increased pain   Treatment Detail/Skilled Intervention Evaluation completed, treatment initiated. Pt educated on recommendations for post-hip procedures (no excessive internal/external rotation, bending, repetitive squatting) to reduce pain but no prec. secondary to approach and that pt is WBAT. Pt voiced understanding. Pt educated on kickstand technique for STS to use as needed for pain management. Pt verbalized understanding. Pt ambulated from recliner to bathroom w/ CGA, FWW, and verbal cues for safe walker use. Pt c/o increased pain w/ ambulation, pt frequently reminded to take deep breaths and pause as needed. Pt able to tolerate >4 min of  standing, using FWW and/or sink for balance/distributing weight w/ CGA. At end of session, pt in recliner w/ chair alarm on and call light in reach.   OT Discharge Planning   OT Plan LB dressing w/ AD, shower/tub trsf, toileting, g/h, increasing standing/activity tolerance   OT Discharge Recommendation (DC Rec) Transitional Care Facility   OT Rationale for DC Rec Pt ind at baseline w/ ADLs/IADLs. Pt currently requiring assist w/ ADLs for safety. If pain and mobility improve, pt could potentially progress to going home safely w/ assist from spouse or home therapy. Otherwise, pt would benefit from TCU to enhance safety and ind w/ ADLs/IADLs   OT Brief overview of current status CGA amb, Min/Mod.A STS, FWW, pain   Total Session Time   Timed Code Treatment Minutes 10   Total Session Time (sum of timed and untimed services) 25

## 2024-10-28 NOTE — PLAN OF CARE
Problem: Adult Inpatient Plan of Care  Goal: Absence of Hospital-Acquired Illness or Injury  Intervention: Prevent Skin Injury  Recent Flowsheet Documentation  Taken 10/28/2024 1358 by Foote, Madison, RN  Body Position:   position changed independently   position maintained  Taken 10/28/2024 0919 by Madison Foote RN  Body Position:   position changed independently   position maintained     Problem: Adult Inpatient Plan of Care  Goal: Optimal Comfort and Wellbeing  Intervention: Monitor Pain and Promote Comfort  Recent Flowsheet Documentation  Taken 10/28/2024 1005 by Madison Foote RN  Pain Management Interventions:   care clustered   distraction   emotional support   pillow support provided  Taken 10/28/2024 0919 by Madison Foote RN  Pain Management Interventions: medication (see MAR)     Problem: Pain Acute  Goal: Optimal Pain Control and Function  Intervention: Develop Pain Management Plan  Recent Flowsheet Documentation  Taken 10/28/2024 1005 by Madison Foote RN  Pain Management Interventions:   care clustered   distraction   emotional support   pillow support provided  Taken 10/28/2024 0919 by Madison Foote RN  Pain Management Interventions: medication (see MAR)  Intervention: Prevent or Manage Pain  Recent Flowsheet Documentation  Taken 10/28/2024 0919 by Madison Foote RN  Medication Review/Management: medications reviewed   Goal Outcome Evaluation:  Patient alert and oriented x4. Moving with assist of 1 using walker and gait belt, WBAT. Up in chair during the day. Vitals stable on room air. Regular diet with adequate intake. Leach removed in AM, PVR completed. Lower hip dressing has scant drainage, upper dressing is CDI. Pain in L hip managed using PRN 5 mg oxycodone and scheduled tylenol. CMS intact. Saline locked. Wife present for support.

## 2024-10-29 ENCOUNTER — APPOINTMENT (OUTPATIENT)
Dept: OCCUPATIONAL THERAPY | Facility: HOSPITAL | Age: 84
DRG: 481 | End: 2024-10-29
Payer: COMMERCIAL

## 2024-10-29 ENCOUNTER — APPOINTMENT (OUTPATIENT)
Dept: PHYSICAL THERAPY | Facility: HOSPITAL | Age: 84
DRG: 481 | End: 2024-10-29
Payer: COMMERCIAL

## 2024-10-29 LAB
GLUCOSE BLDC GLUCOMTR-MCNC: 114 MG/DL (ref 70–99)
HGB BLD-MCNC: 13.1 G/DL (ref 13.3–17.7)
HOLD SPECIMEN: NORMAL

## 2024-10-29 PROCEDURE — 97535 SELF CARE MNGMENT TRAINING: CPT | Mod: GO

## 2024-10-29 PROCEDURE — 85018 HEMOGLOBIN: CPT | Performed by: PHYSICIAN ASSISTANT

## 2024-10-29 PROCEDURE — 250N000013 HC RX MED GY IP 250 OP 250 PS 637: Performed by: PHYSICIAN ASSISTANT

## 2024-10-29 PROCEDURE — 36415 COLL VENOUS BLD VENIPUNCTURE: CPT | Performed by: PHYSICIAN ASSISTANT

## 2024-10-29 PROCEDURE — 250N000013 HC RX MED GY IP 250 OP 250 PS 637: Performed by: INTERNAL MEDICINE

## 2024-10-29 PROCEDURE — 120N000001 HC R&B MED SURG/OB

## 2024-10-29 PROCEDURE — 97110 THERAPEUTIC EXERCISES: CPT | Mod: GP

## 2024-10-29 PROCEDURE — 97116 GAIT TRAINING THERAPY: CPT | Mod: GP

## 2024-10-29 PROCEDURE — 97530 THERAPEUTIC ACTIVITIES: CPT | Mod: GP

## 2024-10-29 PROCEDURE — 99232 SBSQ HOSP IP/OBS MODERATE 35: CPT | Performed by: INTERNAL MEDICINE

## 2024-10-29 RX ORDER — OXYCODONE HYDROCHLORIDE 5 MG/1
10 TABLET ORAL
Status: DISCONTINUED | OUTPATIENT
Start: 2024-10-29 | End: 2024-10-29

## 2024-10-29 RX ORDER — FENTANYL CITRATE 50 UG/ML
25 INJECTION, SOLUTION INTRAMUSCULAR; INTRAVENOUS
Status: DISCONTINUED | OUTPATIENT
Start: 2024-10-29 | End: 2024-10-29

## 2024-10-29 RX ORDER — ACETAMINOPHEN 325 MG/1
975 TABLET ORAL ONCE
Status: DISCONTINUED | OUTPATIENT
Start: 2024-10-29 | End: 2024-10-29

## 2024-10-29 RX ORDER — ONDANSETRON 2 MG/ML
4 INJECTION INTRAMUSCULAR; INTRAVENOUS EVERY 30 MIN PRN
Status: DISCONTINUED | OUTPATIENT
Start: 2024-10-29 | End: 2024-10-30

## 2024-10-29 RX ORDER — LISINOPRIL AND HYDROCHLOROTHIAZIDE 20; 25 MG/1; MG/1
1 TABLET ORAL DAILY
Status: DISCONTINUED | OUTPATIENT
Start: 2024-10-29 | End: 2024-10-30 | Stop reason: HOSPADM

## 2024-10-29 RX ORDER — OXYCODONE HYDROCHLORIDE 5 MG/1
5 TABLET ORAL
Status: DISCONTINUED | OUTPATIENT
Start: 2024-10-29 | End: 2024-10-29

## 2024-10-29 RX ORDER — NALOXONE HYDROCHLORIDE 0.4 MG/ML
0.1 INJECTION, SOLUTION INTRAMUSCULAR; INTRAVENOUS; SUBCUTANEOUS
Status: DISCONTINUED | OUTPATIENT
Start: 2024-10-29 | End: 2024-10-30

## 2024-10-29 RX ORDER — DEXAMETHASONE SODIUM PHOSPHATE 10 MG/ML
4 INJECTION, SOLUTION INTRAMUSCULAR; INTRAVENOUS
Status: DISCONTINUED | OUTPATIENT
Start: 2024-10-29 | End: 2024-10-30

## 2024-10-29 RX ORDER — ONDANSETRON 4 MG/1
4 TABLET, ORALLY DISINTEGRATING ORAL EVERY 30 MIN PRN
Status: DISCONTINUED | OUTPATIENT
Start: 2024-10-29 | End: 2024-10-30

## 2024-10-29 RX ADMIN — ASPIRIN 81 MG: 81 TABLET, COATED ORAL at 22:12

## 2024-10-29 RX ADMIN — POLYETHYLENE GLYCOL 3350 17 G: 17 POWDER, FOR SOLUTION ORAL at 08:26

## 2024-10-29 RX ADMIN — OXYCODONE HYDROCHLORIDE 5 MG: 5 TABLET ORAL at 22:11

## 2024-10-29 RX ADMIN — OXYCODONE HYDROCHLORIDE 5 MG: 5 TABLET ORAL at 13:08

## 2024-10-29 RX ADMIN — ACETAMINOPHEN 975 MG: 325 TABLET ORAL at 06:47

## 2024-10-29 RX ADMIN — SENNOSIDES AND DOCUSATE SODIUM 1 TABLET: 8.6; 5 TABLET ORAL at 22:12

## 2024-10-29 RX ADMIN — ACETAMINOPHEN 975 MG: 325 TABLET ORAL at 22:12

## 2024-10-29 RX ADMIN — METOPROLOL SUCCINATE 50 MG: 50 TABLET, EXTENDED RELEASE ORAL at 08:24

## 2024-10-29 RX ADMIN — LISINOPRIL AND HYDROCHLOROTHIAZIDE 1 TABLET: 25; 20 TABLET ORAL at 13:04

## 2024-10-29 RX ADMIN — ACETAMINOPHEN 975 MG: 325 TABLET ORAL at 14:19

## 2024-10-29 RX ADMIN — OXYCODONE HYDROCHLORIDE 5 MG: 5 TABLET ORAL at 18:02

## 2024-10-29 RX ADMIN — OXYCODONE HYDROCHLORIDE 5 MG: 5 TABLET ORAL at 03:17

## 2024-10-29 RX ADMIN — ASPIRIN 81 MG: 81 TABLET, COATED ORAL at 08:23

## 2024-10-29 RX ADMIN — OXYCODONE HYDROCHLORIDE 10 MG: 5 TABLET ORAL at 09:47

## 2024-10-29 RX ADMIN — SENNOSIDES AND DOCUSATE SODIUM 1 TABLET: 8.6; 5 TABLET ORAL at 08:25

## 2024-10-29 ASSESSMENT — ACTIVITIES OF DAILY LIVING (ADL)
ADLS_ACUITY_SCORE: 0
ADLS_ACUITY_SCORE: 16.25
ADLS_ACUITY_SCORE: 0

## 2024-10-29 NOTE — PROGRESS NOTES
"[facility]    Medicine Progress Note - Hospitalist Service    Date of Admission:  10/27/2024    Assessment & Plan      Reddy Allen is a 83 year old male admitted on 10/27/2024. He has a hx of htn who fell today at  [place] tripping on a stool. He sustained an acute left displaced and varus angulated intertrochanteric left proximal femur fracture.     1.Acute Hip fx left  -S/p ORIF on 10/27   --- Awaiting TCU placement    Acute blood loss anemia  Hemoglobin   Date Value Ref Range Status   10/29/2024 13.1 (L) 13.3 - 17.7 g/dL Final   No transfusion needed      3 essential hypertension suboptimal control  Restart hydrochlorothiazide/lisinopril with holding parameters  -- Continue metoprolol    4.Hx of parox afib in past  -on metoprolol  -not on anticoagulation  --DC telemetry and pulse oximetry     5.Hyperglycemia  -likely stress related, told me in past was checked and had normal A1c last year  Lab Results   Component Value Date    A1C 5.8 10/27/2024    A1C 5.4 01/17/2023          6.Code-full           Diet: Advance Diet as Tolerated: Regular Diet Adult    DVT Prophylaxis: Defer to the orthopedics.  Currently on aspirin  Leach Catheter: Not present  Lines: None     Cardiac Monitoring: None  Code Status: Full Code      Clinically Significant Risk Factors                   # Hypertension: Noted on problem list           # Overweight: Estimated body mass index is 26.89 kg/m  as calculated from the following:    Height as of this encounter: 1.88 m (6' 2\").    Weight as of this encounter: 95 kg (209 lb 7 oz)., PRESENT ON ADMISSION       # Financial/Environmental Concerns: none         Disposition Plan     Medically Ready for Discharge: Anticipate next a.m.             Bharat Small MD  Hospitalist Service  [facility]  Securely message with [employer] (more info)  Text page via [employer] Paging/Directory "   ______________________________________________________________________    Interval History   Patient is ambulating the halls    Physical Exam   Vital Signs: Temp: 98.4  F (36.9  C) Temp src: Oral BP: (!) 151/75 Pulse: 85   Resp: 18 SpO2: 94 % O2 Device: None (Room air)    Weight: 209 lbs 6.99 oz    No apparent distress.  Hearing impaired.  Uses hearing aids.      Medical Decision Making       35 MINUTES SPENT BY ME on the date of service doing chart review, history, exam, documentation & further activities per the note.      Data     I have personally reviewed the following data over the past 24 hrs:    N/A  \   13.1 (L)   / N/A     N/A N/A N/A /  114 (H)   N/A N/A N/A \

## 2024-10-29 NOTE — PROGRESS NOTES
"Orthopedic Progress Note      Assessment: 2 Days Post-Op  S/P Procedure(s):  INTERNAL FIXATION, FRACTURE, TROCHANTERIC, HIP, USING TRACY     Plan:   - Continue PT/OT.   - Weightbearing status: WBAT  - Anticoagulation: ASA in addition to SCDs, shira stockings and early ambulation.  - Discharge planning: TCU, pending placement     Subjective:  Pain: Well controlled on current medication  Nausea, Vomiting:  No  Chest pain: No  Lightheadedness, Dizziness:  No  Neuro:  Patient denies new onset numbness or paresthesias     Patient doing well on POD #2. Ambulating, tolerating oral intake, voiding & pain is controlled with oral medications. 2/10 at rest, 4/10 WB. Hgb 13.1 (11.8 yesterday).     Objective:  BP (!) 151/75 (BP Location: Left arm)   Pulse 85   Temp 98.4  F (36.9  C) (Oral)   Resp 18   Ht 1.88 m (6' 2\")   Wt 95 kg (209 lb 7 oz)   SpO2 94%   BMI 26.89 kg/m    The patient is A&Ox3. Appears comfortable, sitting up at bedside.  Calves are soft and non-tender bilaterally  Brisk capillary refill in the toes.   Palpable left dorsalis pedis pulse. Foot warm & well-perfused.  Sensation is intact to light touch & equal bilaterally in the femoral, DP, SP & tibial nerve distributions.  ROM: Flexes at left hip. Appropriately flexes & extends all toes bilaterally.   Motor: +5/5 dorsiflexion, plantar flexion & EHL bilaterally. Fires quad.   Leg lengths equal.  left hip dressing C/D/I without strikethrough, no surrounding erythema.       5/5 TA/GSC/EHL.        Pertinent Labs   Lab Results: personally reviewed.   No results found for: \"INR\", \"PROTIME\"  Lab Results   Component Value Date    WBC 14.4 (H) 10/28/2024    HGB 13.1 (L) 10/29/2024    HCT 35.2 (L) 10/28/2024    MCV 91 10/28/2024     10/28/2024     Lab Results   Component Value Date     10/28/2024    CO2 23 10/28/2024         Report completed by:  Tammy Aguilar PA-C/Dr. Martini  [facility]    Date: 10/29/2024  Time: 2:40 PM    "

## 2024-10-29 NOTE — PLAN OF CARE
Problem: Pain Acute  Goal: Optimal Pain Control and Function  Outcome: Progressing  Intervention: Prevent or Manage Pain  Recent Flowsheet Documentation  Taken 10/28/2024 1616 by Carmen Diaz RN  Sensory Stimulation Regulation:   quiet environment promoted   television on  Medication Review/Management: medications reviewed     Problem: Surgery Nonspecified  Goal: Effective Bowel Elimination  Intervention: Enhance Bowel Motility and Elimination  Recent Flowsheet Documentation  Taken 10/28/2024 1616 by Carmen Diaz RN  Bowel Motility Enhancement:   ambulation promoted   fluid intake encouraged     Problem: Surgery Nonspecified  Goal: Effective Urinary Elimination  Outcome: Progressing   Goal Outcome Evaluation:  Patient has spent a good shift so far. Alert and oriented x 4. Left hip surgical dressings intact. Patient rated his pain at 2/10 on the pain scale. Patient appeared anxious about not having any bowel movement. Bowel sounds positive in all 4 quadrants. Patient ambulated to and from the bathroom x 2 to void. Prune juice offered/given after dinner. Patient would receive scheduled Senna with other scheduled hs medications.

## 2024-10-29 NOTE — PROGRESS NOTES
"Care Management Follow Up    Length of Stay (days): 2    Expected Discharge Date: 10/30/2024    Anticipated Discharge Plan:  Transitional Care    Transportation: TBD    PT Recommendations: (S) home with assist, home with home care physical therapy, home with outpatient physical therapy  OT Recommendations:  Transitional Care Facility     Barriers to Discharge: medical stability    Prior Living Situation: house (\"It is a split entry house so I would have to use steps at home\".) with spouse    Discussed  Partnership in Safe Discharge Planning  document with patient/family: No     Handoff Completed: No, handoff not indicated or clinically appropriate    Patient/Spokesperson Updated: Yes. Who? Patient and wife     Additional Information:  See below     Next Steps: continue to follow     Zuleima Choudhury RN      Checked [employer] portal, says \"pending clinical review\".     [employer] accepted patient for tomorrow     1:23 PM  Called [employer] get a verbal auth, spoke to Coni. Gave her requested information. She says she is sending to medical director for review.     Met with patient and wife. He would like to go to [facility]. I explained that insurance auth is still pending and I would keep them updated     2:00 PM  Auth approved [id number] Auth#    Updated wife. She would like wheelchair transport and is aware of potential cost   "

## 2024-10-29 NOTE — PLAN OF CARE
Problem: Adult Inpatient Plan of Care  Goal: Absence of Hospital-Acquired Illness or Injury  Intervention: Prevent Skin Injury  Recent Flowsheet Documentation  Taken 10/29/2024 0900 by Fernandez Ny RN  Body Position: position changed independently  Taken 10/29/2024 0830 by Fernandez Ny RN  Body Position: position changed independently  Taken 10/29/2024 0730 by Fernandez Ny RN  Body Position: position changed independently     Problem: Adult Inpatient Plan of Care  Goal: Optimal Comfort and Wellbeing  Intervention: Monitor Pain and Promote Comfort  Recent Flowsheet Documentation  Taken 10/29/2024 0830 by Fernandez yN RN  Pain Management Interventions: rest   Goal Outcome Evaluation:      Plan of Care Reviewed With: patient    Overall Patient Progress: improving    SUBJECTIVE:  Pt a/o with elevated BP and other VSS; BP meds given. [place] and 1 assist with gait belt and walker. Pt worked with PT/OT. Ambulated x2 this shift. C/o pian on right hip; pain meds given. Old drainage on dressing. Pt will transfer to TCU when bed available.  Fernandez Bridges RN

## 2024-10-29 NOTE — PLAN OF CARE
Problem: Pain Acute  Goal: Optimal Pain Control and Function  Outcome: Progressing  Intervention: Develop Pain Management Plan  Recent Flowsheet Documentation  Taken 10/29/2024 0309 by Cong Austin RN  Pain Management Interventions: medication (see MAR)  Taken 10/29/2024 0000 by Cong Austin RN  Pain Management Interventions: rest  Intervention: Prevent or Manage Pain  Recent Flowsheet Documentation  Taken 10/29/2024 0000 by Cong Austin RN  Medication Review/Management: medications reviewed       Problem: Dysrhythmia  Goal: Normalized Cardiac Rhythm  Outcome: Progressing  Intervention: Monitor and Manage Cardiac Rhythm Effect  Recent Flowsheet Documentation  Taken 10/29/2024 0000 by Cong Austin RN  VTE Prevention/Management: SCDs on (sequential compression devices)         Goal Outcome Evaluation:  Patient is aox4. Hard of hearing and using hearing aid. Patient c/o 6/0 left hip pain with activity. Pain 3/10 at rest. Pain managed with scheduled tylenol and prn 5 mg oxycodone x1. Patient declined cold pack. Dressing on left hip intact, no new bleeding noted. Pulse present on LE. Patient has numbness at baseline on LLE. VSS except BP slightly elevated. Asymptomatic. On RA. Patient encouraged to ambulate to bathroom w/ assist x1 and walker. Weight bearing as able. Voiding spontaneously. Call-light within reach. Bed alarm on.

## 2024-10-30 ENCOUNTER — LAB REQUISITION (OUTPATIENT)
Dept: LAB | Facility: CLINIC | Age: 84
End: 2024-10-30
Payer: COMMERCIAL

## 2024-10-30 ENCOUNTER — APPOINTMENT (OUTPATIENT)
Dept: PHYSICAL THERAPY | Facility: HOSPITAL | Age: 84
DRG: 481 | End: 2024-10-30
Payer: COMMERCIAL

## 2024-10-30 ENCOUNTER — APPOINTMENT (OUTPATIENT)
Dept: OCCUPATIONAL THERAPY | Facility: HOSPITAL | Age: 84
DRG: 481 | End: 2024-10-30
Payer: COMMERCIAL

## 2024-10-30 VITALS
HEART RATE: 98 BPM | BODY MASS INDEX: 26.88 KG/M2 | OXYGEN SATURATION: 96 % | HEIGHT: 74 IN | SYSTOLIC BLOOD PRESSURE: 128 MMHG | WEIGHT: 209.44 LBS | DIASTOLIC BLOOD PRESSURE: 64 MMHG | RESPIRATION RATE: 20 BRPM | TEMPERATURE: 97.7 F

## 2024-10-30 DIAGNOSIS — Z11.1 ENCOUNTER FOR SCREENING FOR RESPIRATORY TUBERCULOSIS: ICD-10-CM

## 2024-10-30 PROCEDURE — 250N000011 HC RX IP 250 OP 636: Performed by: PHYSICIAN ASSISTANT

## 2024-10-30 PROCEDURE — 99239 HOSP IP/OBS DSCHRG MGMT >30: CPT | Performed by: INTERNAL MEDICINE

## 2024-10-30 PROCEDURE — 97110 THERAPEUTIC EXERCISES: CPT | Mod: GP

## 2024-10-30 PROCEDURE — 97530 THERAPEUTIC ACTIVITIES: CPT | Mod: GO

## 2024-10-30 PROCEDURE — 97535 SELF CARE MNGMENT TRAINING: CPT | Mod: GO

## 2024-10-30 PROCEDURE — 250N000013 HC RX MED GY IP 250 OP 250 PS 637: Performed by: PHYSICIAN ASSISTANT

## 2024-10-30 PROCEDURE — 97116 GAIT TRAINING THERAPY: CPT | Mod: GP

## 2024-10-30 PROCEDURE — 250N000013 HC RX MED GY IP 250 OP 250 PS 637: Performed by: INTERNAL MEDICINE

## 2024-10-30 RX ORDER — HYDROCODONE BITARTRATE AND ACETAMINOPHEN 5; 325 MG/1; MG/1
1 TABLET ORAL EVERY 6 HOURS PRN
Status: DISCONTINUED | OUTPATIENT
Start: 2024-10-30 | End: 2024-10-30 | Stop reason: HOSPADM

## 2024-10-30 RX ORDER — AMOXICILLIN 250 MG
1 CAPSULE ORAL 2 TIMES DAILY PRN
Qty: 60 TABLET | Refills: 0 | Status: SHIPPED | OUTPATIENT
Start: 2024-10-30

## 2024-10-30 RX ORDER — HYDROCODONE BITARTRATE AND ACETAMINOPHEN 5; 325 MG/1; MG/1
1 TABLET ORAL EVERY 6 HOURS PRN
Qty: 40 TABLET | Refills: 0 | Status: SHIPPED | OUTPATIENT
Start: 2024-10-30

## 2024-10-30 RX ORDER — ASPIRIN 81 MG/1
81 TABLET ORAL 2 TIMES DAILY
Qty: 60 TABLET | Refills: 0 | Status: SHIPPED | OUTPATIENT
Start: 2024-10-30

## 2024-10-30 RX ORDER — ACETAMINOPHEN 325 MG/1
650 TABLET ORAL EVERY 4 HOURS PRN
Qty: 100 TABLET | Refills: 0 | Status: SHIPPED | OUTPATIENT
Start: 2024-10-30

## 2024-10-30 RX ADMIN — ASPIRIN 81 MG: 81 TABLET, COATED ORAL at 09:04

## 2024-10-30 RX ADMIN — METOPROLOL SUCCINATE 50 MG: 50 TABLET, EXTENDED RELEASE ORAL at 09:03

## 2024-10-30 RX ADMIN — ACETAMINOPHEN 975 MG: 325 TABLET ORAL at 05:55

## 2024-10-30 RX ADMIN — SENNOSIDES AND DOCUSATE SODIUM 1 TABLET: 8.6; 5 TABLET ORAL at 09:03

## 2024-10-30 RX ADMIN — POLYETHYLENE GLYCOL 3350 17 G: 17 POWDER, FOR SOLUTION ORAL at 09:03

## 2024-10-30 RX ADMIN — ONDANSETRON 4 MG: 2 INJECTION INTRAMUSCULAR; INTRAVENOUS at 09:04

## 2024-10-30 RX ADMIN — OXYCODONE HYDROCHLORIDE 5 MG: 5 TABLET ORAL at 05:56

## 2024-10-30 RX ADMIN — ACETAMINOPHEN 975 MG: 325 TABLET ORAL at 14:09

## 2024-10-30 RX ADMIN — BISACODYL 10 MG: 10 SUPPOSITORY RECTAL at 11:11

## 2024-10-30 RX ADMIN — LISINOPRIL AND HYDROCHLOROTHIAZIDE 1 TABLET: 25; 20 TABLET ORAL at 09:04

## 2024-10-30 ASSESSMENT — ACTIVITIES OF DAILY LIVING (ADL)
ADLS_ACUITY_SCORE: 0

## 2024-10-30 NOTE — PROGRESS NOTES
Care Management Discharge Note    Discharge Date: 10/30/2024       Discharge Disposition: Transitional Care    Discharge Services: None    Discharge DME: None    Discharge Transportation: family or friend will provide    Private pay costs discussed: transportation costs    Does the patient's insurance plan have a 3 day qualifying hospital stay waiver?  Yes     Which insurance plan 3 day waiver is available? Alternative insurance waiver    Will the waiver be used for post-acute placement? Yes    PAS Confirmation Code: [id number]  Patient/family educated on Medicare website which has current facility and service quality ratings: yes    Education Provided on the Discharge Plan: Yes  Persons Notified of Discharge Plans: patient  Patient/Family in Agreement with the Plan:      Handoff Referral Completed: No, handoff not indicated or clinically appropriate    Additional Information:  Patient ready for discharge . Updated patient of ride time. Confirmed with Arnaud at [facility]. Nurse updated     Mitali Patino and lucas Fong on discharge location    11:00 AM  Met with patient and wife. They are agreeable to plan      Zuleima Choudhury RN

## 2024-10-30 NOTE — PLAN OF CARE
Problem: Adult Inpatient Plan of Care  Goal: Plan of Care Review  Description: The Plan of Care Review/Shift note should be completed every shift.  The Outcome Evaluation is a brief statement about your assessment that the patient is improving, declining, or no change.  This information will be displayed automatically on your shift  note.  Outcome: Adequate for Care Transition   Goal Outcome Evaluation:  Pt is alert and oriented x4-VSS-pt is afebrile and on RA. He felt a little dizzy and nauseated after getting up this am. Pt medicated with zofran with some improvement. Pt had an oxycodone 5mg before getting up so nausea/dizziness may be related to that. Ortho informed and changed oxycodone to hydrocodne for pain. Pt up with 1 assist and walker and has been up in halls and up to bathroom. Bisacodyl suppository given with moderate hard results. Pts wife at bedside-he will be  transferring to [place] this afternoon for further rehab.

## 2024-10-30 NOTE — PLAN OF CARE
Problem: Adult Inpatient Plan of Care  Goal: Plan of Care Review  Description: The Plan of Care Review/Shift note should be completed every shift.  The Outcome Evaluation is a brief statement about your assessment that the patient is improving, declining, or no change.  This information will be displayed automatically on your shift  note.  Outcome: Progressing  Goal: Absence of Hospital-Acquired Illness or Injury  Intervention: Identify and Manage Fall Risk  Recent Flowsheet Documentation  Taken 10/30/2024 0030 by Severiano Garcia RN  Safety Promotion/Fall Prevention:   safety round/check completed   clutter free environment maintained   increased rounding and observation   increase visualization of patient  Intervention: Prevent Skin Injury  Recent Flowsheet Documentation  Taken 10/30/2024 0030 by Severiano Garcia RN  Body Position: position changed independently  Intervention: Prevent and Manage VTE (Venous Thromboembolism) Risk  Recent Flowsheet Documentation  Taken 10/30/2024 0030 by Severaino Garcia RN  VTE Prevention/Management: SCDs on (sequential compression devices)     Problem: Pain Acute  Goal: Optimal Pain Control and Function  Outcome: Progressing  Intervention: Prevent or Manage Pain  Recent Flowsheet Documentation  Taken 10/30/2024 0030 by Severiano Garcia RN  Medication Review/Management: medications reviewed     Problem: Surgery Nonspecified  Goal: Absence of Bleeding  Outcome: Progressing  Goal: Anesthesia/Sedation Recovery  Intervention: Optimize Anesthesia Recovery  Recent Flowsheet Documentation  Taken 10/30/2024 0030 by Severiano Garcia RN  Safety Promotion/Fall Prevention:   safety round/check completed   clutter free environment maintained   increased rounding and observation   increase visualization of patient  Goal: Effective Oxygenation and Ventilation  Intervention: Optimize Oxygenation and Ventilation  Recent Flowsheet Documentation  Taken  10/30/2024 0030 by Severiano Garcia RN  Activity Management: activity adjusted per tolerance  Head of Bed (HOB) Positioning: HOB at 20-30 degrees     Problem: Comorbidity Management  Goal: Blood Pressure in Desired Range  Outcome: Progressing  Intervention: Maintain Blood Pressure Management  Recent Flowsheet Documentation  Taken 10/30/2024 0030 by Severiano Garcia RN  Medication Review/Management: medications reviewed   Goal Outcome Evaluation:       Pt alert & oriented. Denies pain. Assist of 1 for transfers using walker. Dressing C/D/I. On room air, Vital Signs WNL. Will continue to monitor.

## 2024-10-30 NOTE — DISCHARGE SUMMARY
"[facility]  Hospitalist Discharge Summary      Date of Admission:  10/27/2024  Date of Discharge:  10/30/2024  Discharging Provider: Bharat Small MD  Discharge Service: Hospitalist Service    Discharge Diagnoses   Acute left hip fracture.  Acute blood loss anemia  Essential hypertension    Clinically Significant Risk Factors     # Overweight: Estimated body mass index is 26.89 kg/m  as calculated from the following:    Height as of this encounter: 1.88 m (6' 2\").    Weight as of this encounter: 95 kg (209 lb 7 oz).       Follow-ups Needed After Discharge   Follow-up Appointments       Follow Up and recommended labs and tests      Follow up with Nursing home physician.  No follow up labs or test are needed.  Follow-up with orthopedic as recommended                Unresulted Labs Ordered in the Past 30 Days of this Admission       No orders found from 9/27/2024 to 10/28/2024.        These results will be followed up by not applicable    Discharge Disposition   Discharged to short-term care facility  Condition at discharge: Stable    Hospital Course   Reddy Allen is a 83 year old male admitted on 10/27/2024. He has a hx of htn who fell today at  [place] tripping on a stool. He sustained an acute left displaced and varus angulated intertrochanteric left proximal femur fracture.     1.Acute Hip fx left  -S/p ORIF on 10/27   --- DVT prophylaxis with aspirin as per orthopedics  -- Orthopedics changed oxycodone to Norco due to nausea with oxycodone    Acute blood loss anemia  Hemoglobin   Date Value Ref Range Status   10/29/2024 13.1 (L) 13.3 - 17.7 g/dL Final   No transfusion needed      3 essential hypertension controlled  Continue hydrochlorothiazide/lisinopril with holding parameters  -- Continue metoprolol    4.Hx of parox afib in past  -on metoprolol  -not on anticoagulation  --DC telemetry and pulse oximetry     5.Hyperglycemia  -likely stress related, told me in past was checked and " had normal A1c last year  Lab Results   Component Value Date    A1C 5.8 10/27/2024    A1C 5.4 01/17/2023          6.Code-full     CKD 3  -Creatinine stable  Creatinine   Date Value Ref Range Status   10/28/2024 1.21 (H) 0.67 - 1.17 mg/dL Final         Consultations This Hospital Stay   CARE MANAGEMENT / SOCIAL WORK IP CONSULT  CARE MANAGEMENT / SOCIAL WORK IP CONSULT  HOSPITALIST IP CONSULT  PHYSICAL THERAPY ADULT IP CONSULT  OCCUPATIONAL THERAPY ADULT IP CONSULT  PHYSICAL THERAPY ADULT IP CONSULT  OCCUPATIONAL THERAPY ADULT IP CONSULT  PHYSICAL THERAPY ADULT IP CONSULT  OCCUPATIONAL THERAPY ADULT IP CONSULT    Code Status   Full Code    Time Spent on this Encounter   I, Bharat Small MD, personally saw the patient today and spent greater than 30 minutes discharging this patient.       Bharat Small MD  [place] [id number]  Phone: [phone]  Fax: [phone]  ______________________________________________________________________    Physical Exam   Vital Signs: Temp: 97.7  F (36.5  C) Temp src: Oral BP: 128/64 Pulse: 98   Resp: 20 SpO2: 96 % O2 Device: None (Room air)    Weight: 209 lbs 6.99 oz  No apparent distress       Primary Care Physician   Héctor Nicholson    Discharge Orders      Primary Care - Care Coordination Referral      Shower with wound/dressing covered    You must COVER your dressing or incision with saran wrap (or any other non-permeable covering) to allow the incision to remain dry while showering.  You may shower 3 days after surgery as long as the surgical wound stays dry. Continue to cover your dressing or incision for showering until your first office visit.  You are strictly prohibited from soaking   or submerging the surgical wound underwater.     General info for SNF    Length of Stay Estimate: Short Term Care: Estimated # of Days <30  Condition at Discharge: Improving  Level of care:skilled   Rehabilitation Potential:  Good  Admission H&P remains valid and up-to-date: Yes  Recent Chemotherapy: N/A  Use Nursing Home Standing Orders: Yes     Mantoux instructions    Give two-step Mantoux (PPD) Per Facility Policy Yes     Follow Up and recommended labs and tests    Follow up with Nursing home physician.  No follow up labs or test are needed.  Follow-up with orthopedic as recommended     Reason for your hospital stay    Fall     Activity - Up ad sergo     Weight bearing status    As tolerated     Full Code     Physical Therapy Adult Consult    Evaluate and treat as clinically indicated.    Reason: Status Post Hip Surgery     Occupational Therapy Adult Consult    Evaluate and treat as clinically indicated.    Reason: Status Post Hip Surgery     Physical Therapy Adult Consult    Evaluate and treat as clinically indicated.    Reason: S/p hip fracture     Occupational Therapy Adult Consult    Evaluate and treat as clinically indicated.    Reason: S/p hip fracture     Fall precautions     Crutches DME    DME Documentation: Describe the reason for need to support medical necessity: Impaired gait status post hip surgery. I, the undersigned, certify that the above prescribed supplies are medically necessary for this patient and is both reasonable and necessary in reference to accepted standards of medical practice in the treatment of this patient's condition and is not prescribed as a convenience.     Cane DME    DME Documentation: Describe the reason for need to support medical necessity: Impaired gait status post hip surgery. I, the undersigned, certify that the above prescribed supplies are medically necessary for this patient and is both reasonable and necessary in reference to accepted standards of medical practice in the treatment of this patient's condition and is not prescribed as a convenience.     Walker DME    DME Documentation: Describe the reason for need to support medical necessity: Impaired gait status post hip surgery. I, the  undersigned, certify that the above prescribed supplies are medically necessary for this patient and is both reasonable and necessary in reference to accepted standards of medical practice in the treatment of this patient's condition and is not prescribed as a convenience.     Diet    Follow this diet upon discharge: Regular       Significant Results and Procedures   Results for orders placed or performed during the hospital encounter of 10/27/24   Head CT w/o contrast    Narrative    EXAM: CT HEAD W/O CONTRAST  LOCATION: [facility]  DATE: 10/27/2024    INDICATION: Head injury.  COMPARISON: None.  TECHNIQUE: Routine CT Head without IV contrast. Multiplanar reformats. Dose reduction techniques were used.    FINDINGS:  INTRACRANIAL CONTENTS: No intracranial hemorrhage, extraaxial collection, or mass effect.  No CT evidence of acute infarct. Mild presumed chronic small vessel ischemic changes. Mild generalized volume loss. No hydrocephalus.     VISUALIZED ORBITS/SINUSES/MASTOIDS: No intraorbital abnormality. No paranasal sinus mucosal disease. No middle ear or mastoid effusion.    BONES/SOFT TISSUES: No acute abnormality.      Impression    IMPRESSION:  1.  No CT evidence for acute intracranial process.  2.  Brain atrophy and presumed chronic microvascular ischemic changes as above.   CT Cervical Spine w/o Contrast    Narrative    EXAM: CT CERVICAL SPINE W/O CONTRAST  LOCATION: [facility]  DATE: 10/27/2024    INDICATION: Neck pain.  COMPARISON: None.  TECHNIQUE: Routine CT Cervical Spine without IV contrast. Multiplanar reformats. Dose reduction techniques were used.    FINDINGS:  VERTEBRA: Normal vertebral body heights and alignment. No fracture or posttraumatic subluxation.     CANAL/FORAMINA: Moderate left neural foraminal stenosis at C3-C4. Severe left neural foraminal stenosis at C4-C5. Moderate bilateral neural foraminal stenosis at C5-C6.    PARASPINAL: No  "extraspinal abnormality.      Impression    IMPRESSION:  1.  No acute cervical spine fracture.   XR Pelvis and Hip Left 2 Views    Narrative    EXAM: XR PELVIS AND HIP LEFT 2 VIEWS  LOCATION: [facility]  DATE: 10/27/2024    INDICATION: Fall. Left hip pain.  COMPARISON: None.      Impression    IMPRESSION: Acute impacted, displaced and varus angulated intertrochanteric left proximal femur fracture. No dislocation of the left femoral head from the acetabular socket. Mild bilateral hip joint space narrowing, more pronounced on the right. Diffuse   bone demineralization. No displaced pelvic fracture is identified. Degenerative change lower lumbar spine.    NOTE: ABNORMAL REPORT    THE DICTATION ABOVE DESCRIBES AN ABNORMALITY FOR WHICH FOLLOW-UP IS NEEDED.    XR Knee Left 1/2 Views    Narrative    EXAM: XR KNEE LEFT 1/2 VIEWS  LOCATION: [facility]  DATE: 10/27/2024    INDICATION: Fall, abrasion.  COMPARISON: None.      Impression    IMPRESSION: Nonstandard exam. Repeat exam when clinically feasible recommended.    No acute displaced left knee fracture or kristan dislocation is identified. Mild degenerative change left knee. No sizable left knee joint effusion.   XR Surgery CHRISTEL Fluoro L/T 5 Min    Narrative    This exam was marked as non-reportable because it will not be read by a   radiologist or a [place] non-radiologist provider.         POC US Guidance Needle Placement    Narrative    Ultrasound was performed as guidance to an anesthesia procedure.  Click   \"PACS images\" hyperlink below to view any stored images.  For specific   procedure details, view procedure note authored by anesthesia.       Discharge Medications   Current Discharge Medication List        START taking these medications    Details   acetaminophen (TYLENOL) 325 MG tablet Take 2 tablets (650 mg) by mouth every 4 hours as needed for mild pain.  Qty: 100 tablet, Refills: 0    Associated Diagnoses: " Closed displaced intertrochanteric fracture of left femur, initial encounter (H)      aspirin 81 MG EC tablet Take 1 tablet (81 mg) by mouth 2 times daily.  Qty: 60 tablet, Refills: 0    Associated Diagnoses: Closed displaced intertrochanteric fracture of left femur, initial encounter (H)      HYDROcodone-acetaminophen (NORCO) 5-325 MG tablet Take 1 tablet by mouth every 6 hours as needed for moderate pain.  Qty: 40 tablet, Refills: 0    Associated Diagnoses: Closed displaced intertrochanteric fracture of left femur, initial encounter (H)      senna-docusate (SENOKOT-S/PERICOLACE) 8.6-50 MG tablet Take 1 tablet by mouth 2 times daily as needed for constipation.  Qty: 60 tablet, Refills: 0    Associated Diagnoses: Closed displaced intertrochanteric fracture of left femur, initial encounter (H)           CONTINUE these medications which have NOT CHANGED    Details   cetirizine (ZYRTEC) 10 MG tablet Take 10 mg by mouth daily as needed for allergies.      lisinopril-hydrochlorothiazide (ZESTORETIC) 20-25 MG tablet Take 1 tablet by mouth once daily  Qty: 90 tablet, Refills: 0    Comments: Schedule a fasting office visit with Dr. Nicholson before further refills.  Associated Diagnoses: Essential hypertension      metoprolol succinate ER (TOPROL XL) 50 MG 24 hr tablet Take 1 tablet by mouth once daily  Qty: 90 tablet, Refills: 2    Associated Diagnoses: Essential hypertension      MULTIVITS-MIN/HRB  (URINOZINC PROSTATE FORMULA ORAL) [MULTIVITS-MIN/HRB  (URINOZINC PROSTATE FORMULA ORAL)] Take by mouth.           Allergies   Allergies   Allergen Reactions    Levaquin [Levofloxacin] Rash     Now

## 2024-10-30 NOTE — PROGRESS NOTES
"Orthopedic Progress Note      Assessment: 3 Days Post-Op  S/P Procedure(s):  INTERNAL FIXATION, FRACTURE, TROCHANTERIC, HIP, USING TRACY     Plan:   - Continue PT/OT.   - Weightbearing status: WBAT  - Anticoagulation: ASA in addition to SCDs, shira stockings and early ambulation.  - will change Oxycodone to Norco for pain management due to nausea and dizziness  - Discharge planning: TCU, pending placement    Subjective:  Pain: well controlled on current medication  Chest pain, SOB: no  Nausea, Vomiting:  nauseous  Lightheadedness, Dizziness:  dizzy  Neuro:  Patient denies new onset numbness or paresthesias    Patient reports that he is feeling nauseous. Started after taking Oxy this morning at 6 AM. Was able to eat a small amount of breakfast. Also reports that he is slightly dizzy, worse when he was ambulating in PT today, better now that he is sitting in chair. States that he did not sleep well last night and feels tired. Pain is 2/10 at rest, 4/10 with ambulation.     Objective:  /64 (BP Location: Left arm)   Pulse 98   Temp 97.7  F (36.5  C) (Oral)   Resp 20   Ht 1.88 m (6' 2\")   Wt 95 kg (209 lb 7 oz)   SpO2 96%   BMI 26.89 kg/m    The patient is A&Ox3. Sitting in chair, appears tired, drifting off to sleep.  Sensation is intact.  Calves are soft and non-tender bilaterally  Brisk capillary refill in the toes.   Palpable left dorsalis pedis pulse. Foot warm & well-perfused.  Sensation is intact to light touch & equal bilaterally in the femoral, DP, SP & tibial nerve distributions.  ROM: Flexes at left hip. Appropriately flexes & extends all toes bilaterally.   Motor: +5/5 dorsiflexion, plantar flexion & EHL bilaterally. Fires quad.   Leg lengths equal.  left hip dressing C/D/I without strikethrough, no surrounding erythema.    5/5 TA/GSC/EHL        Pertinent Labs   Lab Results: personally reviewed.   No results found for: \"INR\", \"PROTIME\"  Lab Results   Component Value Date    WBC 14.4 (H) 10/28/2024    " HGB 13.1 (L) 10/29/2024    HCT 35.2 (L) 10/28/2024    MCV 91 10/28/2024     10/28/2024     Lab Results   Component Value Date     10/28/2024    CO2 23 10/28/2024         Report completed by:  Tammy Aguilar PA-C/Dr. Martini  [facility]    Date: 10/30/2024  Time: 10:41 AM

## 2024-10-30 NOTE — PLAN OF CARE
Physical Therapy Discharge Summary    Reason for therapy discharge:    Discharged to transitional care facility.    Progress towards therapy goal(s). See goals on Care Plan in [facility] electronic health record for goal details.  Goals partially met.  Barriers to achieving goals:   discharge from facility.    Therapy recommendation(s):    Continued therapy is recommended.  Rationale/Recommendations:  to improve functional mobility and strength.

## 2024-10-30 NOTE — PLAN OF CARE
Occupational Therapy Discharge Summary    Reason for therapy discharge:    Discharged to transitional care facility.    Progress towards therapy goal(s). See goals on Care Plan in [facility] electronic health record for goal details.  Goals partially met.  Barriers to achieving goals:   discharge from facility.    Therapy recommendation(s):    Continued therapy is recommended.  Rationale/Recommendations:  discharge to TCU for continued skilled therapy/ADL indRACHEAL Amaya, MAYTER/L, 10/30/2024, 3:45 PM

## 2024-10-31 ENCOUNTER — LAB REQUISITION (OUTPATIENT)
Dept: LAB | Facility: CLINIC | Age: 84
End: 2024-10-31
Payer: COMMERCIAL

## 2024-10-31 ENCOUNTER — PATIENT OUTREACH (OUTPATIENT)
Dept: CARE COORDINATION | Facility: CLINIC | Age: 84
End: 2024-10-31
Payer: COMMERCIAL

## 2024-10-31 ENCOUNTER — TRANSITIONAL CARE UNIT VISIT (OUTPATIENT)
Dept: GERIATRICS | Facility: CLINIC | Age: 84
End: 2024-10-31
Payer: COMMERCIAL

## 2024-10-31 VITALS
TEMPERATURE: 98 F | HEIGHT: 74 IN | OXYGEN SATURATION: 92 % | RESPIRATION RATE: 18 BRPM | DIASTOLIC BLOOD PRESSURE: 76 MMHG | SYSTOLIC BLOOD PRESSURE: 139 MMHG | BODY MASS INDEX: 26.89 KG/M2 | HEART RATE: 78 BPM | WEIGHT: 209.5 LBS

## 2024-10-31 DIAGNOSIS — M62.81 GENERALIZED MUSCLE WEAKNESS: ICD-10-CM
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
## 2024-10-31 DIAGNOSIS — I48.11 LONGSTANDING PERSISTENT ATRIAL FIBRILLATION (H): ICD-10-CM

## 2024-10-31 DIAGNOSIS — D62 ANEMIA DUE TO BLOOD LOSS, ACUTE: ICD-10-CM

## 2024-10-31 DIAGNOSIS — R53.81 PHYSICAL DECONDITIONING: ICD-10-CM

## 2024-10-31 DIAGNOSIS — J30.2 SEASONAL ALLERGIC REACTION: ICD-10-CM

## 2024-10-31 DIAGNOSIS — N17.9 ACUTE KIDNEY FAILURE, UNSPECIFIED (H): ICD-10-CM

## 2024-10-31 DIAGNOSIS — I10 ESSENTIAL HYPERTENSION: ICD-10-CM

## 2024-10-31 DIAGNOSIS — K59.01 SLOW TRANSIT CONSTIPATION: ICD-10-CM

## 2024-10-31 DIAGNOSIS — Z87.81 S/P LEFT HIP FRACTURE: Primary | ICD-10-CM

## 2024-10-31 DIAGNOSIS — N18.31 CHRONIC KIDNEY DISEASE, STAGE 3A (H): ICD-10-CM

## 2024-10-31 DIAGNOSIS — D62 ACUTE POSTHEMORRHAGIC ANEMIA: ICD-10-CM

## 2024-10-31 PROBLEM — E78.00 HYPERCHOLESTEROLEMIA: Status: ACTIVE | Noted: 2024-10-31

## 2024-10-31 PROBLEM — M65.30 TRIGGER FINGER, ACQUIRED: Status: ACTIVE | Noted: 2024-10-31

## 2024-10-31 PROCEDURE — P9603 ONE-WAY ALLOW PRORATED MILES: HCPCS | Mod: ORL | Performed by: FAMILY MEDICINE

## 2024-10-31 PROCEDURE — 86481 TB AG RESPONSE T-CELL SUSP: CPT | Mod: ORL | Performed by: FAMILY MEDICINE

## 2024-10-31 PROCEDURE — 99309 SBSQ NF CARE MODERATE MDM 30: CPT

## 2024-10-31 PROCEDURE — 36415 COLL VENOUS BLD VENIPUNCTURE: CPT | Mod: ORL | Performed by: FAMILY MEDICINE

## 2024-10-31 RX ORDER — BISACODYL 10 MG
10 SUPPOSITORY, RECTAL RECTAL DAILY PRN
COMMUNITY
Start: 2024-10-31

## 2024-10-31 RX ORDER — SENNA AND DOCUSATE SODIUM 50; 8.6 MG/1; MG/1
1 TABLET, FILM COATED ORAL 2 TIMES DAILY
COMMUNITY
Start: 2024-10-31

## 2024-10-31 RX ORDER — POLYETHYLENE GLYCOL 3350 17 G/17G
1 POWDER, FOR SOLUTION ORAL DAILY
COMMUNITY
Start: 2024-10-31

## 2024-10-31 NOTE — PROGRESS NOTES
[facility] GERIATRICS    PRIMARY CARE PROVIDER AND CLINIC:  Héctor Nicholson MD, [address] [facility] [place] [address] / [facility] [address]  Chief Complaint   Patient presents with    Hospital F/U      [place] Medical Record Number:  [id number]  Place of Service where encounter took place:  [facility] ([facility]) [[id number]]    Reddy Allen  is a 83 year old  (1940), admitted to the above facility from  [facility]. Hospital stay 10/27/24 through 10/30/24.  HPI:  83 year old with PMH of HTN, proximal A-fib with no anticoagulation, rosacea, acute anemia, CKD.  Who presented to emergency room after a fall and left hip fracture.Acute impacted, displaced and varus angulated intertrochanteric left proximal femur fracture. S/P internal fixation trochanter of left hip on 10/27/2024.  Anticoagulated prophylactically with ASA BID.  He transferred to U for rehabilitation and medical management.    History obtained from: facility chart records, facility staff, patient report, [facility] chart review, and Care Everywhere [facility] chart review     Today, Mr. Allen is seen in his room sitting on the chair.  He was able to stand up with assist of 1 to show me his hip surgical site.  The site is covered with dressing minimal swelling around the dressing noted.  He is eating and drinking okay the last bowel movement was 3 days ago. He has a new complaint of constipation that he would like resolved soon.  Bowel meds ordered including suppository today.  He denies any other issues, pain is controlled with current pain medication.  Staff report he is doing well.    CODE STATUS/ADVANCE DIRECTIVES DISCUSSION:  Full Code  CPR/Full code   ALLERGIES:   Allergies   Allergen Reactions    Levaquin [Levofloxacin] Rash     Now       PAST MEDICAL HISTORY: No past medical history on file.   PAST SURGICAL HISTORY:   has a past surgical history that includes Open reduction internal fixation hip nailing (Left,  "10/27/2024).  FAMILY HISTORY: family history is not on file.  SOCIAL HISTORY:   reports that he has quit smoking. He has never used smokeless tobacco. He reports that he does not drink alcohol and does not use drugs.  Patient's living condition: lives with spouse    Post Discharge Medication Reconciliation Status:   MED REC REQUIRED  Post Medication Reconciliation Status: discharge medications reconciled and changed, per note/orders       Current Outpatient Medications   Medication Sig Dispense Refill    acetaminophen (TYLENOL) 325 MG tablet Take 2 tablets (650 mg) by mouth every 4 hours as needed for mild pain. 100 tablet 0    aspirin 81 MG EC tablet Take 1 tablet (81 mg) by mouth 2 times daily. 60 tablet 0    cetirizine (ZYRTEC) 10 MG tablet Take 10 mg by mouth daily as needed for allergies.      HYDROcodone-acetaminophen (NORCO) 5-325 MG tablet Take 1 tablet by mouth every 6 hours as needed for moderate pain. 40 tablet 0    lisinopril-hydrochlorothiazide (ZESTORETIC) 20-25 MG tablet Take 1 tablet by mouth once daily 90 tablet 0    metoprolol succinate ER (TOPROL XL) 50 MG 24 hr tablet Take 1 tablet by mouth once daily 90 tablet 2    MULTIVITS-MIN/HRB  (URINOZINC PROSTATE FORMULA ORAL) Take 1 tablet by mouth daily.      senna-docusate (SENOKOT-S/PERICOLACE) 8.6-50 MG tablet Take 1 tablet by mouth 2 times daily as needed for constipation. 60 tablet 0     No current facility-administered medications for this visit.       ROS:  4 point ROS including Respiratory, CV, GI and , other than that noted in the HPI,  is negative    Vitals:  /76   Pulse 78   Temp 98  F (36.7  C)   Resp 18   Ht 1.88 m (6' 2\")   Wt 95 kg (209 lb 8 oz)   SpO2 92%   BMI 26.90 kg/m    Exam:  GENERAL APPEARANCE:  Alert, in no distress  RESP:  respiratory effort and palpation of chest normal, lungs clear to auscultation , no respiratory distress  CV:  regular rate and rhythm, no murmur, rub, or gallop, +2 pedal pulses, mild edema " +2 BLE  ABDOMEN:  normal bowel sounds, soft, nontender, no hepatosplenomegaly or other masses, Recent constipation, no guarding or rebound, bowel sounds normal  :    Using urinal  M/S:   Gait and station normal  SKIN:  Inspection of skin and subcutaneous tissue baseline  NEURO:   Cranial nerves 2-12 are normal tested and grossly at patient's baseline  PSYCH:  oriented X 3    Lab/Diagnostic data:  Most Recent 3 CBC's:  Recent Labs   Lab Test 10/29/24  0553 10/28/24  0552 10/27/24  1123 01/17/23  0937   WBC  --  14.4* 8.3 5.7   HGB 13.1* 11.8* 14.9 14.9   MCV  --  91 90 92   PLT  --  188 250 254     Most Recent 3 BMP's:  Recent Labs   Lab Test 10/29/24  0650 10/28/24  0552 10/27/24  1123 02/23/24  1508   NA  --  138 143 139   POTASSIUM  --  3.7 3.6 3.7   CHLORIDE  --  102 105 101   CO2  --  23 25 27   BUN  --  24.4* 21.3 24.3*   CR  --  1.21* 1.24* 1.28*   ANIONGAP  --  13 13 11   SUZI  --  8.3* 8.9 9.1   * 129* 151* 101*       ASSESSMENT/PLAN:    (Z87.81) S/p left hip fracture  (primary encounter diagnosis)  (W19.XXXD) Fall, subsequent encounter  (M62.81) Generalized muscle weakness  (R53.81) Physical deconditioning  Comment: Acute on chronic post fall fracture doing well post surgery.  Working with therapy ongoing.  Plan:   -PT/OT eval and treat  -Pain controlled with APAP-Norco  -Prophylactic anticoagulation ASA BID  -Follow-up with orthopedics    (N18.31) Chronic kidney disease, stage   (I48.11) Longstanding persistent atrial fibrillation (H)  (I10) Essential hypertension  Comment: Acute on chronic CKD creatinine trending 1.21-1.5 recent 1.21. SBP controlled with current blood pressure medication ranging 137-140 longstanding A-fib not anticoagulated rate control with Metoprolol.  Plan:   -Continue metoprolol 50 mg daily  -Continue lisinopril-hydrochlorothiazide 20-25 mg daily  -BMP due 11/1/2024      (K59.01) Slow transit constipation  Comment: Acute on chronic no bowel movement for the last few days  .  Plan:   -Start senna S1 tab BID  -Start daily MiraLAX  -Dulcolax suppository as needed    (D62) Anemia due to blood loss, acute  Comment: Acute due to recent surgery last hemoglobin 13.1.  Plan:   -Recheck hemoglobin on 11/01/2024    Seasonal allergy  -PTA cetirizine as needed    Orders:  - Senna BID  -Miralax daily   - Dulcolax supp PRN  - Lab hemoglobin , BMP due 11/01/24    Electronically signed by:KLAUDIA Anderson CNP

## 2024-10-31 NOTE — LETTER
10/31/2024      Reddy Allen  [address] [place] [address]        [facility] GERIATRICS    PRIMARY CARE PROVIDER AND CLINIC:  Héctor Nicholson MD, [address] [place] [address] / [place] [address]  Chief Complaint   Patient presents with     Hospital F/U      [place] Medical Record Number:  [id number]  Place of Service where encounter took place:  [facility] ([facility]) [[id number]]    Reddy Allen  is a 83 year old  (1940), admitted to the above facility from  [facility]. Hospital stay 10/27/24 through 10/30/24.  HPI:  83 year old with PMH of HTN, proximal A-fib with no anticoagulation, rosacea, acute anemia, CKD.  Who presented to emergency room after a fall and left hip fracture.Acute impacted, displaced and varus angulated intertrochanteric left proximal femur fracture. S/P internal fixation trochanter of left hip on 10/27/2024.  Anticoagulated prophylactically with ASA BID.  He transferred to U for rehabilitation and medical management.    History obtained from: facility chart records, facility staff, patient report, [facility] chart review, and Care Everywhere [facility] chart review     Today, Mr. Allen is seen in his room sitting on the chair.  He was able to stand up with assist of 1 to show me his hip surgical site.  The site is covered with dressing minimal swelling around the dressing noted.  He is eating and drinking okay the last bowel movement was 3 days ago. He has a new complaint of constipation that he would like resolved soon.  Bowel meds ordered including suppository today.  He denies any other issues, pain is controlled with current pain medication.  Staff report he is doing well.    CODE STATUS/ADVANCE DIRECTIVES DISCUSSION:  Full Code  CPR/Full code   ALLERGIES:   Allergies   Allergen Reactions     Levaquin [Levofloxacin] Rash     Now       PAST MEDICAL HISTORY: No past medical history on file.   PAST SURGICAL HISTORY:   has a past surgical history that  "includes Open reduction internal fixation hip nailing (Left, 10/27/2024).  FAMILY HISTORY: family history is not on file.  SOCIAL HISTORY:   reports that he has quit smoking. He has never used smokeless tobacco. He reports that he does not drink alcohol and does not use drugs.  Patient's living condition: lives with spouse    Post Discharge Medication Reconciliation Status:   MED REC REQUIRED  Post Medication Reconciliation Status: discharge medications reconciled and changed, per note/orders       Current Outpatient Medications   Medication Sig Dispense Refill     acetaminophen (TYLENOL) 325 MG tablet Take 2 tablets (650 mg) by mouth every 4 hours as needed for mild pain. 100 tablet 0     aspirin 81 MG EC tablet Take 1 tablet (81 mg) by mouth 2 times daily. 60 tablet 0     cetirizine (ZYRTEC) 10 MG tablet Take 10 mg by mouth daily as needed for allergies.       HYDROcodone-acetaminophen (NORCO) 5-325 MG tablet Take 1 tablet by mouth every 6 hours as needed for moderate pain. 40 tablet 0     lisinopril-hydrochlorothiazide (ZESTORETIC) 20-25 MG tablet Take 1 tablet by mouth once daily 90 tablet 0     metoprolol succinate ER (TOPROL XL) 50 MG 24 hr tablet Take 1 tablet by mouth once daily 90 tablet 2     MULTIVITS-MIN/HRB  (URINOZINC PROSTATE FORMULA ORAL) Take 1 tablet by mouth daily.       senna-docusate (SENOKOT-S/PERICOLACE) 8.6-50 MG tablet Take 1 tablet by mouth 2 times daily as needed for constipation. 60 tablet 0     No current facility-administered medications for this visit.       ROS:  4 point ROS including Respiratory, CV, GI and , other than that noted in the HPI,  is negative    Vitals:  /76   Pulse 78   Temp 98  F (36.7  C)   Resp 18   Ht 1.88 m (6' 2\")   Wt 95 kg (209 lb 8 oz)   SpO2 92%   BMI 26.90 kg/m    Exam:  GENERAL APPEARANCE:  Alert, in no distress  RESP:  respiratory effort and palpation of chest normal, lungs clear to auscultation , no respiratory distress  CV:  regular " rate and rhythm, no murmur, rub, or gallop, +2 pedal pulses, mild edema +2 BLE  ABDOMEN:  normal bowel sounds, soft, nontender, no hepatosplenomegaly or other masses, Recent constipation, no guarding or rebound, bowel sounds normal  :    Using urinal  M/S:   Gait and station normal  SKIN:  Inspection of skin and subcutaneous tissue baseline  NEURO:   Cranial nerves 2-12 are normal tested and grossly at patient's baseline  PSYCH:  oriented X 3    Lab/Diagnostic data:  Most Recent 3 CBC's:  Recent Labs   Lab Test 10/29/24  0553 10/28/24  0552 10/27/24  1123 01/17/23  0937   WBC  --  14.4* 8.3 5.7   HGB 13.1* 11.8* 14.9 14.9   MCV  --  91 90 92   PLT  --  188 250 254     Most Recent 3 BMP's:  Recent Labs   Lab Test 10/29/24  0650 10/28/24  0552 10/27/24  1123 02/23/24  1508   NA  --  138 143 139   POTASSIUM  --  3.7 3.6 3.7   CHLORIDE  --  102 105 101   CO2  --  23 25 27   BUN  --  24.4* 21.3 24.3*   CR  --  1.21* 1.24* 1.28*   ANIONGAP  --  13 13 11   SUZI  --  8.3* 8.9 9.1   * 129* 151* 101*       ASSESSMENT/PLAN:    (Z87.81) S/p left hip fracture  (primary encounter diagnosis)  (W19.XXXD) Fall, subsequent encounter  (M62.81) Generalized muscle weakness  (R53.81) Physical deconditioning  Comment: Acute on chronic post fall fracture doing well post surgery.  Working with therapy ongoing.  Plan:   -PT/OT eval and treat  -Pain controlled with APAP-Norco  -Prophylactic anticoagulation ASA BID  -Follow-up with orthopedics    (N18.31) Chronic kidney disease, stage   (I48.11) Longstanding persistent atrial fibrillation (H)  (I10) Essential hypertension  Comment: Acute on chronic CKD creatinine trending 1.21-1.5 recent 1.21. SBP controlled with current blood pressure medication ranging 137-140 longstanding A-fib not anticoagulated rate control with Metoprolol.  Plan:   -Continue metoprolol 50 mg daily  -Continue lisinopril-hydrochlorothiazide 20-25 mg daily  -BMP due 11/1/2024      (K59.01) Slow transit  constipation  Comment: Acute on chronic no bowel movement for the last few days .  Plan:   -Start senna S1 tab BID  -Start daily MiraLAX  -Dulcolax suppository as needed    (D62) Anemia due to blood loss, acute  Comment: Acute due to recent surgery last hemoglobin 13.1.  Plan:   -Recheck hemoglobin on 11/01/2024    Seasonal allergy  -PTA cetirizine as needed    Orders:  - Senna BID  -Miralax daily   - Dulcolax supp PRN  - Lab hemoglobin , BMP due 11/01/24    Electronically signed by:KLAUDIA Anderson CNP                       Sincerely,        KLAUDIA Anderson CNP

## 2024-10-31 NOTE — LETTER
[facility]       To:  [facility]            Please give to facility      From:   Akiko Sutton [facility]  Care Coordinator   [facility]   P: [phone]  [email or web address]        Patient Name:    Reddy Allen   YOB: 1940   Admit date:   10-        Information Needed:  Please contact me when the patient will discharge (or if they will move to long term care)- include the discharge date, disposition, and main diagnosis       If the patient is discharged with home care services, please provide the name of the agency    Also- Please inform me if a care conference is being held.     Phone or Email with information                              Thank you

## 2024-10-31 NOTE — PROGRESS NOTES
Clinic Care Coordination Contact  Care Coordination Transition Communication    [facility]       Date of Admission:  10/27/2024  Date of Discharge:  10/30/2024          Discharge Diagnoses  Acute left hip fracture.  Acute blood loss anemia  Essential hypertension  Assessment: Patient has transitioned to TCU/ARU for short term rehabilitation:    Facility Name: [facility]  Transition Communication:  Notified facility of Primary Care- Care Coordination support via Epic fax.    Plan: Care Coordinator will await notification from facility staff informing of patient's discharge plans/needs. Care Coordinator will review chart and outreach to facility staff every 4 weeks and as needed.   11-5-2024  email from TCU [facility]. He is discharging to home on 11-8-2024 to home with [facility], and DME of wheel chair, hospital bed, commode.    Plan - call after discharge.  Akiko Sutton,   [facility]  [phone]

## 2024-11-01 ENCOUNTER — DOCUMENTATION ONLY (OUTPATIENT)
Dept: GERIATRICS | Facility: CLINIC | Age: 84
End: 2024-11-01
Payer: COMMERCIAL

## 2024-11-01 LAB
ANION GAP SERPL CALCULATED.3IONS-SCNC: 12 MMOL/L (ref 7–15)
BUN SERPL-MCNC: 33.4 MG/DL (ref 8–23)
CALCIUM SERPL-MCNC: 9 MG/DL (ref 8.8–10.4)
CHLORIDE SERPL-SCNC: 97 MMOL/L (ref 98–107)
CREAT SERPL-MCNC: 1.23 MG/DL (ref 0.67–1.17)
EGFRCR SERPLBLD CKD-EPI 2021: 59 ML/MIN/1.73M2
GAMMA INTERFERON BACKGROUND BLD IA-ACNC: 0 IU/ML
GLUCOSE SERPL-MCNC: 156 MG/DL (ref 70–99)
HCO3 SERPL-SCNC: 26 MMOL/L (ref 22–29)
HGB BLD-MCNC: 12.5 G/DL (ref 13.3–17.7)
M TB IFN-G BLD-IMP: NEGATIVE
M TB IFN-G CD4+ BCKGRND COR BLD-ACNC: 0.53 IU/ML
MITOGEN IGNF BCKGRD COR BLD-ACNC: 0 IU/ML
MITOGEN IGNF BCKGRD COR BLD-ACNC: 0.03 IU/ML
POTASSIUM SERPL-SCNC: 3.6 MMOL/L (ref 3.4–5.3)
QUANTIFERON MITOGEN: 0.53 IU/ML
QUANTIFERON NIL TUBE: 0 IU/ML
QUANTIFERON TB1 TUBE: 0 IU/ML
QUANTIFERON TB2 TUBE: 0.03
SODIUM SERPL-SCNC: 135 MMOL/L (ref 135–145)

## 2024-11-01 PROCEDURE — 85018 HEMOGLOBIN: CPT | Mod: ORL | Performed by: FAMILY MEDICINE

## 2024-11-01 PROCEDURE — P9604 ONE-WAY ALLOW PRORATED TRIP: HCPCS | Mod: ORL | Performed by: FAMILY MEDICINE

## 2024-11-01 PROCEDURE — 36415 COLL VENOUS BLD VENIPUNCTURE: CPT | Mod: ORL | Performed by: FAMILY MEDICINE

## 2024-11-01 PROCEDURE — 80048 BASIC METABOLIC PNL TOTAL CA: CPT | Mod: ORL | Performed by: FAMILY MEDICINE

## 2024-11-04 ENCOUNTER — MEDICAL CORRESPONDENCE (OUTPATIENT)
Dept: HEALTH INFORMATION MANAGEMENT | Facility: CLINIC | Age: 84
End: 2024-11-04

## 2024-11-04 ENCOUNTER — TRANSITIONAL CARE UNIT VISIT (OUTPATIENT)
Dept: GERIATRICS | Facility: CLINIC | Age: 84
End: 2024-11-04
Payer: COMMERCIAL

## 2024-11-04 DIAGNOSIS — D62 ABLA (ACUTE BLOOD LOSS ANEMIA): ICD-10-CM

## 2024-11-04 DIAGNOSIS — N18.2 CKD (CHRONIC KIDNEY DISEASE) STAGE 2, GFR 60-89 ML/MIN: ICD-10-CM

## 2024-11-04 DIAGNOSIS — Z79.899 ON DEEP VEIN THROMBOSIS (DVT) PROPHYLAXIS: ICD-10-CM

## 2024-11-04 DIAGNOSIS — I10 ESSENTIAL HYPERTENSION: ICD-10-CM

## 2024-11-04 DIAGNOSIS — Z87.81 S/P LEFT HIP FRACTURE: Primary | ICD-10-CM

## 2024-11-04 PROCEDURE — 99305 1ST NF CARE MODERATE MDM 35: CPT | Performed by: FAMILY MEDICINE

## 2024-11-06 ENCOUNTER — MEDICAL CORRESPONDENCE (OUTPATIENT)
Dept: HEALTH INFORMATION MANAGEMENT | Facility: CLINIC | Age: 84
End: 2024-11-06
Payer: COMMERCIAL

## 2024-11-06 NOTE — PROGRESS NOTES
[facility] GERIATRICS  [place] Medical Record Number:  [id number]  Place of Service where encounter took place: [facility] (TCU) [[id number]]  CODE STATUS:   CPR/Full code     Chief Complaint/Reason for Visit:  Chief Complaint   Patient presents with    Hospital F/U       HPI:    Reddy Allen is a 84 year old male with hx of HTN, admitted to the hospital on 10/27/2024 after a fall in which he sustained a left hip fracture as noted below.     [facility]  Hospitalist Discharge Summary    Date of Admission:  10/27/2024  Date of Discharge:  10/30/2024    Hospital Course  Reddy Allen is a 83 year old male admitted on 10/27/2024. He has a hx of htn who fell today at [facility] tripping on a stool. He sustained an acute left displaced and varus angulated intertrochanteric left proximal femur fracture.     1. Acute Hip fx left  -S/p ORIF on 10/27   --- DVT prophylaxis with aspirin as per orthopedics  -- Orthopedics changed oxycodone to Norco due to nausea with oxycodone     2. Acute blood loss anemia        Hemoglobin   Date Value Ref Range Status   10/29/2024 13.1 (L) 13.3 - 17.7 g/dL Final   No transfusion needed     3 essential hypertension controlled  Continue hydrochlorothiazide/lisinopril with holding parameters  -- Continue metoprolol     4.Hx of parox afib in past  -on metoprolol  -not on anticoagulation  --DC telemetry and pulse oximetry     5.Hyperglycemia  -likely stress related, told me in past was checked and had normal A1c last year        Lab Results   Component Value Date     A1C 5.8 10/27/2024     A1C 5.4 01/17/2023      6.Code-full     7. CKD 3  -Creatinine stable        Creatinine   Date Value Ref Range Status   10/28/2024 1.21 (H) 0.67 - 1.17 mg/dL Final      Overall stabilized and discharged to TCU on 10/30/2024 for PT, OT, nursing cares, medical management and monitoring.     Today:  He has been doing pretty well with therapies, thinks he is making good progress,  "pain controlled with tylenol. Has some swelling left leg surgical side as expected. On aspirin BID for DVT prevention per direction of orthopedics. He has a follow up appt on Fri 11/8/2024. WBAT, working with therapy. Otherwise, appetite is good. No abdominal pain, nausea, vomiting, diarrhea or constipation. No SOB at rest, chest pain, dizziness. No urinary sx. Lives at home with wife. No new vision or hearing concerns.     Notified by TCU nurse manager that he has been cut from insurance, plans to discharge home on Fri 11/8/2024 with home services and DME needs of commode, wheelchair and hospital bed as per below.      REVIEW OF SYSTEMS:  All others negative other than those noted in HPI.      FACE TO FACE FOR FULL ELECTRIC HOSPITAL BED:  The patient will require a hospital bed for use at home after discharge form TCU due to diagnosis of S72.002A closed fracture of left hip, S72.142A closed displaced intertrochanteric fracture of left femur. The patient requires assistance for bed mobility and has need for frequent position changes and positions not feasible in a regular bed due to pain from left hip fracture. The patient will have his bed set up at home in his basement to best accommodate his living situation due to stairs. Without this device, the patient would be at risk of increased pain levels and falls. Additionally, the patent would benefit from FOB elevation for edema prevention. The patient is willing to pay out of pocket for full electric upgrade to decrease caregiver burden.     This equipment is necessary for the duration of my patients lifetime.      FACE TO FACE FOR WHEELCHAIR:  Due to the diagnosis of S72.002A closed fracture of left hip, S72.142A closed displaced intertrochanteric fracture of left femur, my patient (6ft 2in tall, 186lbs) will require and benefit from a standard manual 20\" wheelchair with a gel cushion and elevating leg rest on left. The cushion is necessary since my patient sits in " the wheelchair for greater than 8 hours per day placing him at high risk for skin breakdown.    The patient is able to ambulate with assistance but has only ambulated over 200 feet x 2 since TCU stay, with most ambulation distances around 150 feet. Without this device, the patient would be at increased risk for falls, re-injury or injury and re hospitalization. Furthermore, the patient would benefit from a left elevating foot rest for elevation of the affected limb throughout the day.     Patient has a mobility limitation that significantly impairs his ability to participate in MRADLs such as food prep, dressing, transfers, and ambulation. The patients mobility limitation cannot be sufficiently and safely resolved by use of a cane, walker or crutches as the patient has a diagnosis of dx S72.002A closed fracture of left hip, S72.142A closed displaced intertrochanteric fracture of left femur, which places him at risk for falling. The patient and caregiver are able to safely use a manual wheelchair, and mobility deficit can be resolved by its use. Patients home has adequate access and maneuvering space for the use of the manual wheelchair. Patient will use the wheelchair daily and the patient has not expressed an unwillingness to use it within their home.     This equipment is necessary for the duration of my patients lifetime.      FACE TO FACE FOR 3 in 1 COMMODE:  My patient will require a 3 in 1 commode for use at home after discharge due to dx S72.002A closed fracture of left hip, S72.142A closed displaced intertrochanteric fracture of left femur. The patient is unable to do adequate stairs to reach his upper level bathroom, and therefore will not have access to the restroom in the home. Without this device, the patient would be at risk for incontinence, skin breakdown and overall health decline. Length of need is 99 months.       PAST MEDICAL HISTORY:  Past Medical History:   Diagnosis Date    Hypertension         PAST SURGICAL HISTORY:  Past Surgical History:   Procedure Laterality Date    OPEN REDUCTION INTERNAL FIXATION HIP NAILING Left 10/27/2024    Procedure: INTERNAL FIXATION, FRACTURE, TROCHANTERIC, HIP, USING TRACY;  Surgeon: Petr Martini MD, DO;  Location: [facility] Main OR       FAMILY HISTORY:  No family history on file.      SOCIAL HISTORY:  Social History     Socioeconomic History    Marital status:      Spouse name: Not on file    Number of children: Not on file    Years of education: Not on file    Highest education level: Not on file   Occupational History    Not on file   Tobacco Use    Smoking status: Former    Smokeless tobacco: Never   Substance and Sexual Activity    Alcohol use: No    Drug use: No    Sexual activity: Not on file   Other Topics Concern    Not on file   Social History Narrative    Not on file     Social Drivers of Health     Financial Resource Strain: Low Risk  (10/28/2024)    Financial Resource Strain     Within the past 12 months, have you or your family members you live with been unable to get utilities (heat, electricity) when it was really needed?: No   Food Insecurity: Low Risk  (10/28/2024)    Food Insecurity     Within the past 12 months, did you worry that your food would run out before you got money to buy more?: No     Within the past 12 months, did the food you bought just not last and you didn t have money to get more?: No   Transportation Needs: Low Risk  (10/28/2024)    Transportation Needs     Within the past 12 months, has lack of transportation kept you from medical appointments, getting your medicines, non-medical meetings or appointments, work, or from getting things that you need?: No   Physical Activity: Not on file   Stress: Not on file   Social Connections: Not on file   Interpersonal Safety: Low Risk  (10/27/2024)    Interpersonal Safety     Do you feel physically and emotionally safe where you currently live?: Yes     Within the past 12 months,  have you been hit, slapped, kicked or otherwise physically hurt by someone?: No     Within the past 12 months, have you been humiliated or emotionally abused in other ways by your partner or ex-partner?: No   Housing Stability: Low Risk  (10/28/2024)    Housing Stability     Do you have housing? : Yes     Are you worried about losing your housing?: No       MEDICATIONS:  Current Outpatient Medications   Medication Sig Dispense Refill    acetaminophen (TYLENOL) 325 MG tablet Take 2 tablets (650 mg) by mouth every 4 hours as needed for mild pain. 100 tablet 0    aspirin 81 MG EC tablet Take 1 tablet (81 mg) by mouth 2 times daily. 60 tablet 0    bisacodyl (DULCOLAX) 10 MG suppository Place 1 suppository (10 mg) rectally daily as needed for constipation.      cetirizine (ZYRTEC) 10 MG tablet Take 10 mg by mouth daily as needed for allergies.      HYDROcodone-acetaminophen (NORCO) 5-325 MG tablet Take 1 tablet by mouth every 6 hours as needed for moderate pain. 40 tablet 0    lisinopril-hydrochlorothiazide (ZESTORETIC) 20-25 MG tablet Take 1 tablet by mouth once daily 90 tablet 0    metoprolol succinate ER (TOPROL XL) 50 MG 24 hr tablet Take 1 tablet by mouth once daily 90 tablet 2    MULTIVITS-MIN/HRB  (URINOZINC PROSTATE FORMULA ORAL) Take 1 tablet by mouth daily.      polyethylene glycol (MIRALAX) 17 GM/Dose powder Take 17 g (1 Capful) by mouth daily.      senna-docusate (SENOKOT-S/PERICOLACE) 8.6-50 MG tablet Take 1 tablet by mouth 2 times daily as needed for constipation. 60 tablet 0    SENNA-docusate sodium (SENNA S) 8.6-50 MG tablet Take 1 tablet by mouth 2 times daily.          ALLERGIES:  Allergies   Allergen Reactions    Levaquin [Levofloxacin] Rash     Now        PHYSICAL EXAM:  General: Patient is alert pleasant male, no distress.    Vitals: Blood Pressure: 136 / 76 mmHg, O2 Saturation: 94 %RA, Pulse: 82 per minute, Respirations: 18 per minute, Temperature: 97.7  F  HEENT: Head is NCAT. Eyes show no  injection or icterus. Nares negative. Oropharynx well hydrated.  Neck: Supple. No tenderness or adenopathy. No JVD.  Lungs: Clear bilaterally. No wheezes.  Cardiovascular: Regular rate and rhythm, normal S1. S2.  Back: No spinal or CVA tenderness.  Abdomen: Soft, no tenderness on exam. Bowel sounds present. No guarding rebound or rigidity.  : Deferred.  Extremities: Mild swelling on left, surgical side.  Musculoskeletal: Degen changes.   Skin: Left hip incision bandaged.   Psych: Mood appears good.      LABS/DIAGNOSTIC DATA:  EXAM: XR PELVIS AND HIP LEFT 2 VIEWS  LOCATION: [facility]  DATE: 10/27/2024  INDICATION: Fall. Left hip pain.  COMPARISON: None.                                                         IMPRESSION: Acute impacted, displaced and varus angulated intertrochanteric left proximal femur fracture. No dislocation of the left femoral head from the acetabular socket. Mild bilateral hip joint space narrowing, more pronounced on the right. Diffuse   bone demineralization. No displaced pelvic fracture is identified. Degenerative change lower lumbar spine.      Component      Latest Ref Rn 10/27/2024  11:23 AM 10/28/2024  5:52 AM 10/29/2024  5:53 AM   Hemoglobin      13.3 - 17.7 g/dL 14.9  11.8 (L)  13.1 (L)      Component      Latest Ref Rn 10/27/2024  11:23 AM 10/28/2024  5:52 AM   Sodium      135 - 145 mmol/L 143  138    Anion Gap      7 - 15 mmol/L 13  13    Calcium      8.8 - 10.4 mg/dL 8.9  8.3 (L)    Creatinine      0.67 - 1.17 mg/dL 1.24 (H)  1.21 (H)    GFR Estimate      >60 mL/min/1.73m2 58 (L)  60 (L)    Potassium      3.4 - 5.3 mmol/L 3.6  3.7    Chloride      98 - 107 mmol/L 105  102    Carbon Dioxide (CO2)      22 - 29 mmol/L 25  23    Urea Nitrogen      8.0 - 23.0 mg/dL 21.3  24.4 (H)           ASSESSMENT/PLAN:  Left hip intertrochanteric fracture. Sustained in a fall on 10/27/2024. S/p Left hip IM rodding on 10/27/2024 per Dr. CRISTHIAN Martini. WBAT. Here in TCU for  therapies. Adequate pain management. Follow up per ortho direction.   DVT prevention. He is on aspirin BID.  ABLA. No need for transfusion. Labs as noted above, reviewed.   HTN. On metoprolol and combo lisinopril/hydrochlorothiazide. Monitor Bps in TCU.   CKD. Stage 2-3a. Labs above. Follow as needed.   Hx Afib. On metoprolol, aspirin. No further AC.   Code status is full code.   Disposition. Will discharge home later this week with home care services, due to insurance cut off.   Need for DME. To ensure safe discharge home will need commode, wheelchair and hospital bed.      DISCHARGE PLAN/FACE TO FACE:  I certify that services are/were furnished while this patient was under the care of a physician and that a physician or an allowed non-physician practitioner (NPP), had a face-to-face encounter that meets the physician face-to-face encounter requirements. The encounter was in whole, or in part, related to the primary reason for home health. The patient is confined to his/her home and needs intermittent skilled nursing, physical therapy, speech-language pathology, or the continued need for occupational therapy. A plan of care has been established by a physician and is periodically reviewed by a physician.  Date of Face-to-Face Encounter: 11/4/2024.    I certify that, based on my findings, the following services are medically necessary home health services: PT, OT, HHA, RN.    My clinical findings support the need for the above skilled services because: Needs additional therapy as he returns home for gait, stamina and strengthening. Aide to assist with cares and RN for clinical assessments, medication management.    This patient is homebound because: Too strenuous to leave the home.    The patient is, or has been, under my care and I have initiated the establishment of the plan of care. This patient will be followed by a physician who will periodically review the plan of care.        Electronically signed by: Ana PALUMBO  MD Valerie

## 2024-11-08 ENCOUNTER — TRANSFERRED RECORDS (OUTPATIENT)
Dept: HEALTH INFORMATION MANAGEMENT | Facility: CLINIC | Age: 84
End: 2024-11-08
Payer: COMMERCIAL

## 2024-11-11 ENCOUNTER — TELEPHONE (OUTPATIENT)
Dept: FAMILY MEDICINE | Facility: CLINIC | Age: 84
End: 2024-11-11
Payer: COMMERCIAL

## 2024-11-11 ENCOUNTER — MEDICAL CORRESPONDENCE (OUTPATIENT)
Dept: HEALTH INFORMATION MANAGEMENT | Facility: CLINIC | Age: 84
End: 2024-11-11

## 2024-11-11 NOTE — TELEPHONE ENCOUNTER
Home Care is calling regarding an established patient with [facility].    Requesting orders from: Héctor Nicholson  Provider is following patient: No       Orders Requested    Skilled Nursing  Request for  Declined by patient    Physical Therapy  Request for initial certification (first set of orders)   Frequency:  2x/week for 4 weeks, 1x/week for 2 weeks     Occupational Therapy  Request for initial certification (first set of orders)   Frequency:  1x/week for 6 weeks     Speech Therapy  Request for initial certification (first set of orders)   Frequency:  1x/week for 1 week for start of care    HHA (Home Health Aide)  Request for initial certification (first set of orders)   Frequency:  1x/week for 4 weeks     Dx statement: Unspecified fx of left femur - initial encounter for closed fx warrants need for PT and OT to help patient return to independent prior level of functioning by addressing strength, falls risk and independence with ADLs     Information was gathered and will be sent to provider for review.  RN will contact Home Care with information after provider review.  Information was gathered and will be sent to provider to confirm provider will be following patient.  RN will contact Home Care with information after provider review.  Confirmed ok to leave a detailed message with call back.  Contact information confirmed and updated as needed.    Melany Leyva RN

## 2024-11-12 ENCOUNTER — APPOINTMENT (OUTPATIENT)
Dept: LAB | Facility: CLINIC | Age: 84
End: 2024-11-12
Payer: COMMERCIAL

## 2024-11-12 ENCOUNTER — NURSE TRIAGE (OUTPATIENT)
Dept: FAMILY MEDICINE | Facility: CLINIC | Age: 84
End: 2024-11-12

## 2024-11-12 DIAGNOSIS — R35.0 URINARY FREQUENCY: Primary | ICD-10-CM

## 2024-11-12 LAB
ALBUMIN UR-MCNC: NEGATIVE MG/DL
APPEARANCE UR: CLEAR
BILIRUB UR QL STRIP: NEGATIVE
COLOR UR AUTO: YELLOW
GLUCOSE UR STRIP-MCNC: NEGATIVE MG/DL
HGB UR QL STRIP: NEGATIVE
KETONES UR STRIP-MCNC: NEGATIVE MG/DL
LEUKOCYTE ESTERASE UR QL STRIP: NEGATIVE
NITRATE UR QL: NEGATIVE
PH UR STRIP: 5.5 [PH] (ref 5–8)
SP GR UR STRIP: 1.02 (ref 1–1.03)
UROBILINOGEN UR STRIP-ACNC: 0.2 E.U./DL

## 2024-11-12 PROCEDURE — 81003 URINALYSIS AUTO W/O SCOPE: CPT | Performed by: FAMILY MEDICINE

## 2024-11-12 RX ORDER — NITROFURANTOIN 25; 75 MG/1; MG/1
100 CAPSULE ORAL 2 TIMES DAILY
Qty: 10 CAPSULE | Refills: 0 | Status: SHIPPED | OUTPATIENT
Start: 2024-11-12 | End: 2024-11-12

## 2024-11-12 NOTE — TELEPHONE ENCOUNTER
See response, from the PCP, to the patient, on 11/12/24, regarding nurse triage.    Writer called patient's wife, Virginia, KRISTIN on file, and relayed the above message to Virginia, who verbalized understanding and agrees with the plan.    Writer stated to Virginia that she could come and  a sterile urine cup with wipes from the clinic and collect the urine sample from the patient at home and bring in the sample, ideally with in the hour of collection, and have the lab run the urine sample for the patient.    Adelitar also relayed to Virginia that ideally she should schedule a lab-only appointment for the patient so that there will be lab staff in the clinic when she drops off the sample so that the urine can be run in a timely manner for the patient.    Virginia verbalized understanding and agrees with the plan.    Adelitar offered to assist Virginia with scheduling a lab-only appointment for the patient, but Virginia stated she was not sure when she will be able to come and  the sample cup, so she will call into the clinic to schedule the lab-only appointment for the patient.    Adelitar then huddled with the PCP regarding uncertainty of when the patient will be able to have a sample brought to the clinic and PCP prescribed an antibiotic in the meantime for patient's current symptoms, but still recommended that the patient have a urine sample dropped off at the clinic for testing.    Writer called Reddy and related the above information to Reddy, who stated that Virginia was on the way to the clinic now to drop off a urine sample for the patient for testing.    Writer attempted to huddle with the PCP regarding the urine sample update and the PCP was unavailable at the time.    Writer will attempt to huddle with the PCP again to update the PCP about the urine sample.    Danica Daniel, RN, BSN  [facility]

## 2024-11-12 NOTE — TELEPHONE ENCOUNTER
"See response from the PCP, to the patient, regarding urinalysis results on 11/12/24:    \"Héctor Nicholson MD  [facility]  Please notify patient:    \"No evidence for urinary tract infection.  No current need for antibiotic.  Continue to monitor currently with anticipated resolution of urinary frequency over next week following recent catheterization described.\"    Writer called the patient's wife, Queta, and relayed the above message to Queta, who verbalized understanding and agrees with the plan.    PCP discontinued the antibiotic per Queta's request.    Danica Daniel, RN, BSN  [facility]    "

## 2024-11-12 NOTE — TELEPHONE ENCOUNTER
Attempted to huddle with provider. Provider was unavailable.    Writer sent secure chat to provider. Waiting for response back from provider.    Love Castellon RN  [facility]

## 2024-11-12 NOTE — TELEPHONE ENCOUNTER
"Nurse Triage SBAR    Is this a 2nd Level Triage? YES, LICENSED PRACTITIONER REVIEW IS REQUIRED    Situation: Wife Virginia (CTC on file) calling into clinic with patient to report pain with urination and increasing urinary frequency    Background: Recent hip fracture and surgery on 10/27 with urinary catheterization during surgery. Patient currently homebound  Hx Chronic kidney disease-stage 3    Assessment:   Patient reports stinging pain with urination off and on since he got home from TCU last Friday 11/8  Patient also experiencing frequency, starting within the last day and increasing  Reports intermittent pain with urination only, no pain outside of urination  Rates stinging 4-5/10, reports can occasionally increase to 8/10. Denies \"severe\" pain  Denies having painful urination this morning, but did experience x1 overnight last night.  Pain not present every time he urinates.  Denies back or flank pain  Denies blood in urine, foul smelling urine, cloudiness to urine.  Denies fever. Current temporal temperature 97.4f  Denies discharge from penis, scrotal pain.  Has history of prior UTIs several years ago, states symptoms felt similar    Protocol Recommended Disposition:   See in Office Today, See More Appropriate Protocol    Recommendation: Provided patient and wife Virginia with recommended disposition. Wife states patient is currently homebound due to recent hip surgery and cannot come to clinic. Patient and wife request the above information be sent to the provider to advise next steps. Provided with home care advise. Education provided to call back to clinic if patient experiences any fever, side or lower back pain, or any new or worsening symptoms. Patient and wife endorse understanding and agree with plan.    Routed to provider    Does the patient meet one of the following criteria for ADS visit consideration? 16+ years old, with an [facility] PCP     TIP  Providers, please consider if this condition is " "appropriate for management at one of our Acute and Diagnostic Services sites.     If patient is a good candidate, please use dotphrase <dot>triageresponse and select Refer to ADS to document.     Love Castellon RN  [facility]    Reason for Disposition   Pain or burning with passing urine (urination) and male   All other males with painful urination, or patient wants to be seen    Additional Information   Negative: Shock suspected (e.g., cold/pale/clammy skin, too weak to stand, low BP, rapid pulse)   Negative: Sounds like a life-threatening emergency to the triager   Negative: Followed a female genital area injury (e.g., labia, vagina, vulva)   Negative: Followed a male genital area injury (penis, scrotum)   Negative: Vaginal discharge   Negative: Pus (white, yellow) or bloody discharge from end of penis   Negative: Pain or burning with passing urine (urination) and pregnant   Negative: Pain or burning with passing urine (urination) and female   Negative: Shock suspected (e.g., cold/pale/clammy skin, too weak to stand, low BP, rapid pulse)   Negative: Sounds like a life-threatening emergency to the triager   Negative: Unable to urinate (or only a few drops) and bladder feels very full   Negative: Swollen scrotum   Negative: Pain in scrotum or testicle that persists > 1 hour   Negative: Fever > 100.4 F (38.0 C)   Negative: Vomiting   Negative: Patient sounds very sick or weak to the triager   Negative: SEVERE pain with urination   Negative: Side (flank) or lower back pain present   Negative: Taking antibiotic > 24 hours for UTI and fever persists   Negative: Taking antibiotic > 3 days for UTI and painful urination not improved   Negative: Taking treatment > 3 days for STI (e.g., penile discharge from gonorrhea, chlamydia) and painful urination not improved    Answer Assessment - Initial Assessment Questions  1. SYMPTOM: \"What's the main symptom you're concerned about?\" (e.g., frequency, " "incontinence)      Frequency and stinging pain with urination  2. ONSET: \"When did the  symptoms  start?\"      Off and on since he got home from [facility] last Friday (11/8)  Frequency is increasing   3. PAIN: \"Is there any pain?\" If Yes, ask: \"How bad is it?\" (Scale: 1-10; mild, moderate, severe)      Only pain when urinates, stinging. Rates stinging 4-5/10, can increase to 8/10  Denies flank or back pain  4. CAUSE: \"What do you think is causing the symptoms?\"      UTI  5. OTHER SYMPTOMS: \"Do you have any other symptoms?\" (e.g., blood in urine, fever, flank pain, pain with urination)      Denies flank or back pain, fever, blood in urine, foul smelling urine, cloudiness to urine  Endorses stinging pain with urination, not every time  6. PREGNANCY: \"Is there any chance you are pregnant?\" \"When was your last menstrual period?\"      NA    Answer Assessment - Initial Assessment Questions  1. SEVERITY: \"How bad is the pain?\"  (e.g., Scale 1-10; mild, moderate, or severe)    - MILD (1-3): Complains slightly about urination hurting.    - MODERATE (4-7): Interferes with normal activities.      - SEVERE (8-10): Excruciating, unwilling or unable to urinate because of the pain.       4-5/10 (increases to 8/10)  2. FREQUENCY: \"How many times have you had painful urination today?\"       Denies having today, but experienced last night once  3. PATTERN: \"Is pain present every time you urinate or just sometimes?\"       Present sometimes  4. ONSET: \"When did the painful urination start?\"       11/8, frequency started within last day  5. FEVER: \"Do you have a fever?\" If Yes, ask: \"What is your temperature, how was it measured, and when did it start?\"      denies  6. PAST UTI: \"Have you had a urine infection before?\" If Yes, ask: \"When was the last time?\" and \"What happened that time?\"       Yes has history, has been several years  7. CAUSE: \"What do you think is causing the painful urination?\"       UTI  8. OTHER SYMPTOMS: \"Do you have any " "other symptoms?\" (e.g., flank pain, penis discharge, scrotal pain, blood in urine)      denies    Protocols used: Urinary Symptoms-A-OH, Urination Pain - Male-A-OH    "

## 2024-11-13 ENCOUNTER — DOCUMENTATION ONLY (OUTPATIENT)
Dept: OTHER | Facility: CLINIC | Age: 84
End: 2024-11-13
Payer: COMMERCIAL

## 2024-11-13 ENCOUNTER — PATIENT OUTREACH (OUTPATIENT)
Dept: CARE COORDINATION | Facility: CLINIC | Age: 84
End: 2024-11-13
Payer: COMMERCIAL

## 2024-11-13 DIAGNOSIS — Z09 HOSPITAL DISCHARGE FOLLOW-UP: ICD-10-CM

## 2024-11-13 NOTE — PROGRESS NOTES
Clinic Care Coordination Contact  Transitions of Care Outreach  Chief Complaint   Patient presents with    Clinic Care Coordination - Initial       Most Recent Admission Date: 10-  Most Recent Admission Diagnosis:     Hospital Course  Reddy Allen is a 83 year old male admitted on 10/27/2024. He has a hx of htn who fell today at  [facility] tripping on a stool. He sustained an acute left displaced and varus angulated intertrochanteric left proximal femur fracture.    Most Recent Discharge Date: 11-8-2024  Most Recent Discharge Diagnosis: rehab    Transitions of Care Assessment    Discharge Assessment  How are you doing now that you are home?: doing well  How are your symptoms? (Red Flag symptoms escalate to triage hotline per guidelines): Improved  Do you know how to contact your clinic care team if you have future questions or changes to your health status? : No  Does the patient have their discharge instructions? : Yes  Does the patient have questions regarding their discharge instructions? : No  Were you started on any new medications or were there changes to any of your previous medications? : No  Does the patient have all of their medications?: Yes  Do you have questions regarding any of your medications? : No  Do you have all of your needed medical supplies or equipment (DME)?  (i.e. oxygen tank, CPAP, cane, etc.): Yes         Post-op (Clinicians Only)  Did the patient have surgery or a procedure: No  Fever: No  Chills: No  Eating & Drinking: eating and drinking without complaints/concerns  PO Intake: regular diet  Bowel Function: normal  Urinary Status: voiding without complaint/concerns    No concerns. Home care started.  Pleasant experience at [facility]. Has lots of help from family and friends.  Will send letter and patient to call if they need care coordination.     Follow up Plan     Discharge Follow-Up  Discharge follow up appointment scheduled in alignment with recommended follow up  timeframe or Transitions of Risk Category? (Low = within 30 days; Moderate= within 14 days; High= within 7 days): Yes  Discharge Follow Up Appointment Date: 11/15/24  Discharge Follow Up Appointment Scheduled with?: Primary Care Provider    Future Appointments   Date Time Provider Department Center   11/15/2024 11:00 AM James Hamilton MD [id number]       Outpatient Plan as outlined on AVS reviewed with patient.    For any urgent concerns, please contact our 24 hour nurse triage line: [phone] ([phone])       ANTHONY Baugh

## 2024-11-13 NOTE — LETTER
[facility] CARE COORDINATION  [facility]    November 13, 2024    Reddy Allen  [address] [place] [address]      Dear Reddy,    I am a clinic care coordinator who works with Héctor Nicholson MD with the [facility]. I wanted to thank you for spending the time to talk with me.  Below is a description of clinic care coordination and how I can further assist you.       The clinic care coordination team is made up of a registered nurse, , financial resource worker and community health worker who understand the health care system. The goal of clinic care coordination is to help you manage your health and improve access to the health care system. Our team works alongside your provider to assist you in determining your health and social needs. We can help you obtain health care and community resources, providing you with necessary information and education. We can work with you through any barriers and develop a care plan that helps coordinate and strengthen the communication between you and your care team.  Our services are voluntary and are offered without charge to you personally.    Please feel free to contact me with any questions or concerns regarding care coordination and what we can offer.      We are focused on providing you with the highest-quality healthcare experience possible.    Sincerely,     Akiko Sutton,   [facility]  [phone]

## 2024-11-14 ENCOUNTER — TELEPHONE (OUTPATIENT)
Dept: FAMILY MEDICINE | Facility: CLINIC | Age: 84
End: 2024-11-14
Payer: COMMERCIAL

## 2024-11-14 NOTE — TELEPHONE ENCOUNTER
MTM referral from: Transitions of Care (recent hospital discharge, TCU discharge, or ED visit)    MTM referral outreach attempt #1 on November 14, 2024 at 1:24 PM      Outcome: Patient is not interested at this time because no med changes and no questions    Use bcbs part d map (TCU 11/8/2024)  for the carrier/Plan on the flowsheet          Zahira Boyd MT    [phone]

## 2024-11-15 ENCOUNTER — OFFICE VISIT (OUTPATIENT)
Dept: FAMILY MEDICINE | Facility: CLINIC | Age: 84
End: 2024-11-15
Payer: COMMERCIAL

## 2024-11-15 VITALS
SYSTOLIC BLOOD PRESSURE: 120 MMHG | WEIGHT: 201 LBS | BODY MASS INDEX: 25.8 KG/M2 | RESPIRATION RATE: 21 BRPM | TEMPERATURE: 97.8 F | HEART RATE: 90 BPM | HEIGHT: 74 IN | OXYGEN SATURATION: 99 % | DIASTOLIC BLOOD PRESSURE: 70 MMHG

## 2024-11-15 DIAGNOSIS — M81.0 OSTEOPOROSIS, UNSPECIFIED OSTEOPOROSIS TYPE, UNSPECIFIED PATHOLOGICAL FRACTURE PRESENCE: ICD-10-CM

## 2024-11-15 DIAGNOSIS — S72.002A HIP FRACTURE, LEFT, CLOSED, INITIAL ENCOUNTER (H): ICD-10-CM

## 2024-11-15 DIAGNOSIS — M79.89 LEFT UPPER EXTREMITY SWELLING: Primary | ICD-10-CM

## 2024-11-15 LAB
ALBUMIN SERPL BCG-MCNC: 3.8 G/DL (ref 3.5–5.2)
ALP SERPL-CCNC: 152 U/L (ref 40–150)
ALT SERPL W P-5'-P-CCNC: 13 U/L (ref 0–70)
ANION GAP SERPL CALCULATED.3IONS-SCNC: 11 MMOL/L (ref 7–15)
AST SERPL W P-5'-P-CCNC: 22 U/L (ref 0–45)
BILIRUB SERPL-MCNC: 0.7 MG/DL
BUN SERPL-MCNC: 21.4 MG/DL (ref 8–23)
CALCIUM SERPL-MCNC: 9.5 MG/DL (ref 8.8–10.4)
CHLORIDE SERPL-SCNC: 100 MMOL/L (ref 98–107)
CREAT SERPL-MCNC: 1.15 MG/DL (ref 0.67–1.17)
EGFRCR SERPLBLD CKD-EPI 2021: 64 ML/MIN/1.73M2
ERYTHROCYTE [DISTWIDTH] IN BLOOD BY AUTOMATED COUNT: 12.7 % (ref 10–15)
GLUCOSE SERPL-MCNC: 112 MG/DL (ref 70–99)
HCO3 SERPL-SCNC: 25 MMOL/L (ref 22–29)
HCT VFR BLD AUTO: 35.9 % (ref 40–53)
HGB BLD-MCNC: 12 G/DL (ref 13.3–17.7)
MCH RBC QN AUTO: 30.8 PG (ref 26.5–33)
MCHC RBC AUTO-ENTMCNC: 33.4 G/DL (ref 31.5–36.5)
MCV RBC AUTO: 92 FL (ref 78–100)
PLATELET # BLD AUTO: 333 10E3/UL (ref 150–450)
POTASSIUM SERPL-SCNC: 4.1 MMOL/L (ref 3.4–5.3)
PROT SERPL-MCNC: 7.1 G/DL (ref 6.4–8.3)
RBC # BLD AUTO: 3.9 10E6/UL (ref 4.4–5.9)
SODIUM SERPL-SCNC: 136 MMOL/L (ref 135–145)
WBC # BLD AUTO: 7 10E3/UL (ref 4–11)

## 2024-11-15 PROCEDURE — 85027 COMPLETE CBC AUTOMATED: CPT | Performed by: STUDENT IN AN ORGANIZED HEALTH CARE EDUCATION/TRAINING PROGRAM

## 2024-11-15 PROCEDURE — 80053 COMPREHEN METABOLIC PANEL: CPT | Performed by: STUDENT IN AN ORGANIZED HEALTH CARE EDUCATION/TRAINING PROGRAM

## 2024-11-15 PROCEDURE — 36415 COLL VENOUS BLD VENIPUNCTURE: CPT | Performed by: STUDENT IN AN ORGANIZED HEALTH CARE EDUCATION/TRAINING PROGRAM

## 2024-11-15 ASSESSMENT — PAIN SCALES - GENERAL: PAINLEVEL_OUTOF10: NO PAIN (0)

## 2024-11-15 NOTE — PROGRESS NOTES
Hospital Follow-up Visit:    Assessment and Plan   84-year-old male who presents after hospitalization for left injured trochanteric fracture of the femur surgical post ORIF.  Patient is doing overall well with good pain control and excellent appearing incisions.  He is home now and is going to start physical therapy next week.  He has significant swelling in his left lower extremity though no redness or pain.  He has been quite immobile up until now and due to this I recommended an ultrasound to rule out DVT.  Think this is unlikely given his symptoms as well as taking aspirin after but will rule out for sure.  I recommended some lab tests as well to check his mild anemia he had in the hospital and kidney function given his CKD.  Finally I recommended he have a DEXA scan as low bone density commented on in the x-ray.  May qualify for treatment    1. Left upper extremity swelling (Primary)  - US Lower Extremity Venous Duplex Left; Future    2. Hip fracture, left, closed, initial encounter (H)  - Comprehensive metabolic panel (BMP + Alb, Alk Phos, ALT, AST, Total. Bili, TP); Future  - CBC with platelets; Future    3. Osteoporosis, unspecified osteoporosis type, unspecified pathological fracture presence  - DX Bone Density; Future    James Hodges MD    [facility]  Hospital/Nursing Home/IP Rehab Facility: [facility]  Date of Admission: 10/27/24  Date of Discharge: 10/30/24  Reason(s) for Admission: Left Hip Fracture    Post Discharge Medication Reconciliation: completed by nurse and myself    Summary of hospitalization:  [facility] discharge summary reviewed and copied below    Reddy Allen is a 83 year old male admitted on 10/27/2024. He has a hx of htn who fell today at  [place] tripping on a stool. He sustained an acute left displaced and varus angulated intertrochanteric left proximal femur fracture.     1.Acute Hip fx left  -S/p ORIF on 10/27   --- DVT prophylaxis with  "aspirin as per orthopedics  -- Orthopedics changed oxycodone to Norco due to nausea with oxycodone     Acute blood loss anemia        Hemoglobin   Date Value Ref Range Status   10/29/2024 13.1 (L) 13.3 - 17.7 g/dL Final   No transfusion needed        3 essential hypertension controlled  Continue hydrochlorothiazide/lisinopril with holding parameters  -- Continue metoprolol     4.Hx of parox afib in past  -on metoprolol  -not on anticoagulation  --DC telemetry and pulse oximetry     5.Hyperglycemia  -likely stress related, told me in past was checked and had normal A1c last year        Lab Results   Component Value Date     A1C 5.8 10/27/2024     A1C 5.4 01/17/2023            6.Code-full     CKD 3  -Creatinine stable        Creatinine   Date Value Ref Range Status   10/28/2024 1.21 (H) 0.67 - 1.17 mg/dL Final        Diagnostic Tests/Treatments reviewed:  EXAM: XR PELVIS AND HIP LEFT 2 VIEWS  LOCATION: [facility]  DATE: 10/27/2024     INDICATION: Fall. Left hip pain.  COMPARISON: None.                                                                      IMPRESSION: Acute impacted, displaced and varus angulated intertrochanteric left proximal femur fracture. No dislocation of the left femoral head from the acetabular socket. Mild bilateral hip joint space narrowing, more pronounced on the right. Diffuse   bone demineralization. No displaced pelvic fracture is identified. Degenerative change lower lumbar spine.    Other Healthcare Providers Involved in Patient's Care:  Orthopedics    Update since discharge: improved.         Review of Systems:   Complete ROS negative except as noted in the HPI            Physical Exam:   /70   Pulse 90   Temp 97.8  F (36.6  C)   Resp 21   Ht 1.881 m (6' 2.04\")   Wt 91.2 kg (201 lb)   SpO2 99%   BMI 25.78 kg/m      General appearance: Alert, cooperative, no distress, appears stated age  Head: Normocephalic, atraumatic, without obvious " abnormality  Eyes: Pupils equal round, reactive.  Conjunctiva clear.  Nose: Nares normal, no drainage.  Throat: Lips, mucosa, tongue normal mucosa pink and moist  Neck: Supple, symmetric, trachea midline, no adenopathy.  No thyroid enlargement, tenderness or nodules.    Abdomen: Soft, nontender, nondistended.  Bowel sounds active in all 4 quadrants.  No masses or organomegaly.  Extremities: swelling in lower left extremity with no redness or pain on palpation. Edema to 2+ un to knee. Left hhip incision sC/D/I

## 2024-11-18 ENCOUNTER — HOSPITAL ENCOUNTER (OUTPATIENT)
Dept: ULTRASOUND IMAGING | Facility: CLINIC | Age: 84
Discharge: HOME | End: 2024-11-18
Attending: STUDENT IN AN ORGANIZED HEALTH CARE EDUCATION/TRAINING PROGRAM | Admitting: STUDENT IN AN ORGANIZED HEALTH CARE EDUCATION/TRAINING PROGRAM
Payer: COMMERCIAL

## 2024-11-18 DIAGNOSIS — M79.89 LEFT UPPER EXTREMITY SWELLING: ICD-10-CM

## 2024-11-18 PROCEDURE — 93971 EXTREMITY STUDY: CPT | Mod: LT

## 2024-11-18 NOTE — RESULT ENCOUNTER NOTE
Reddy Allen  Your results from your recent clinic visit show:  Your CMP was normal with normal electrolytes, kidney function, and liver function  Your blood counts were mildly lower but not concerning. I would recommend considering rechecking it in a couple of weeks    If you have more questions please call the clinic at [phone] or send me a [employer] message    Dr. James Hodges

## 2024-11-21 ENCOUNTER — TELEPHONE (OUTPATIENT)
Dept: FAMILY MEDICINE | Facility: CLINIC | Age: 84
End: 2024-11-21
Payer: COMMERCIAL

## 2024-11-21 NOTE — TELEPHONE ENCOUNTER
Home Care is calling regarding an established patient with [facility].       Requesting orders from: Héctor Nicholson  Provider is following patient: Yes  Is this a 60-day recertification request?  No    Orders Requested    HHA (Home Health Aide)  Request for discontinuation of care   Goals have been met/progressing. No longer needs assistance with showers.       Verbal orders given.  Home Care will send orders for provider to sign.  Confirmed ok to leave a detailed message with call back.  Contact information confirmed and updated as needed.    Noel Baxter RN

## 2024-12-03 ENCOUNTER — TRANSFERRED RECORDS (OUTPATIENT)
Dept: HEALTH INFORMATION MANAGEMENT | Facility: CLINIC | Age: 84
End: 2024-12-03
Payer: COMMERCIAL

## 2024-12-04 ENCOUNTER — TELEPHONE (OUTPATIENT)
Dept: FAMILY MEDICINE | Facility: CLINIC | Age: 84
End: 2024-12-04
Payer: COMMERCIAL

## 2024-12-04 NOTE — TELEPHONE ENCOUNTER
Home Care is calling regarding an established patient with  [facility].        12/4/2024     3:32 PM   Home Care Information   Date of Home Care episode start 11/11/2024   Current following provider Dr. Héctor Nicholson   Date provider agreed to follow 11/12/2024    Name/Phone Number Cricket Huitron   Home Care agency [facility]     Requesting orders from: Héctor Nicholson  Provider is following patient: Yes  Is this a 60-day recertification request?  No    Orders Requested    Occupational Therapy  Request for discontinuation of care   Goals have been met/progressing.        Verbal orders given.  Home Care will send orders for provider to sign.  Confirmed ok to leave a detailed message with call back.  Contact information confirmed and updated as needed.    Noel Baxter RN

## 2024-12-08 ENCOUNTER — HEALTH MAINTENANCE LETTER (OUTPATIENT)
Age: 84
End: 2024-12-08

## 2025-01-07 ENCOUNTER — OFFICE VISIT (OUTPATIENT)
Dept: URGENT CARE | Facility: URGENT CARE | Age: 85
End: 2025-01-07
Payer: COMMERCIAL

## 2025-01-07 VITALS
HEART RATE: 100 BPM | OXYGEN SATURATION: 98 % | RESPIRATION RATE: 14 BRPM | DIASTOLIC BLOOD PRESSURE: 85 MMHG | SYSTOLIC BLOOD PRESSURE: 168 MMHG | TEMPERATURE: 98 F

## 2025-01-07 DIAGNOSIS — R04.0 NASAL BLEEDING: Primary | ICD-10-CM

## 2025-01-07 LAB
BASOPHILS # BLD AUTO: 0.1 10E3/UL (ref 0–0.2)
BASOPHILS NFR BLD AUTO: 1 %
EOSINOPHIL # BLD AUTO: 0.2 10E3/UL (ref 0–0.7)
EOSINOPHIL NFR BLD AUTO: 2 %
ERYTHROCYTE [DISTWIDTH] IN BLOOD BY AUTOMATED COUNT: 12.7 % (ref 10–15)
HCT VFR BLD AUTO: 41.6 % (ref 40–53)
HGB BLD-MCNC: 14 G/DL (ref 13.3–17.7)
IMM GRANULOCYTES # BLD: 0 10E3/UL
IMM GRANULOCYTES NFR BLD: 0 %
LYMPHOCYTES # BLD AUTO: 0.8 10E3/UL (ref 0.8–5.3)
LYMPHOCYTES NFR BLD AUTO: 9 %
MCH RBC QN AUTO: 31 PG (ref 26.5–33)
MCHC RBC AUTO-ENTMCNC: 33.7 G/DL (ref 31.5–36.5)
MCV RBC AUTO: 92 FL (ref 78–100)
MONOCYTES # BLD AUTO: 1.1 10E3/UL (ref 0–1.3)
MONOCYTES NFR BLD AUTO: 13 %
NEUTROPHILS # BLD AUTO: 6.3 10E3/UL (ref 1.6–8.3)
NEUTROPHILS NFR BLD AUTO: 75 %
PLATELET # BLD AUTO: 319 10E3/UL (ref 150–450)
RBC # BLD AUTO: 4.52 10E6/UL (ref 4.4–5.9)
WBC # BLD AUTO: 8.5 10E3/UL (ref 4–11)

## 2025-01-07 PROCEDURE — 99213 OFFICE O/P EST LOW 20 MIN: CPT

## 2025-01-07 PROCEDURE — 85025 COMPLETE CBC W/AUTO DIFF WBC: CPT

## 2025-01-07 PROCEDURE — 36415 COLL VENOUS BLD VENIPUNCTURE: CPT

## 2025-01-07 NOTE — PROGRESS NOTES
Patient presents with:  Nose Bleeds: States has had a nose bleed for 3 days after eating      Clinical Decision Making:      ICD-10-CM    1. Nasal bleeding  R04.0 CBC with platelets and differential     PRIMARY CARE FOLLOW-UP SCHEDULING     CBC with platelets and differential        Patient presents to clinic with recurrent nosebleeds, it sounds like this has been an ongoing issue for him for the last 5 years, though has worsened in the last 3 days.  Does not have any current or active nosebleed and I am unable to identify source of bleed at this time to do silver nitrate or cautery.  Physical exam reassuring.  No symptoms of lightheadedness dizziness.  CBC unremarkable.  Recommend to continue nasal gel or saline spray as well as Vaseline to keep membranes moist.  Also recommend humidification in room as we discussed it can be more common to have nose bleeds in winter months due to dryness.  Placed referral to follow-up with primary care provider for ongoing management. Patient instructions as below. Discussed red flag symptoms for when to return.       Patient Instructions   You can continue to use nasal gel or spray for moisture. You can also use Aquaphor or vaseline to keep the area moist. I would also get a humidifier for your room. We will check your blood today to be sure your blood counts are normal. I placed a referral to your primary care provider for ongoing management.     HPI:  Reddy Allen is a 84 year old male who presents today with concerns of nose bleeds. Has been getting on and off nose bleeds every time after he eats. This has been going on intermittently for about 5 years but has gotten more consistent in the last 3 days. Usually stops within 10-20 minutes. Always happens from the left nare. Take aspirin but no other blood thinners. No known personal or family hx of bleeding disorders. No lightheadedness or dizziness. Has been using nasal gel but it still seems to occur. Had last nose bleed  last a couple hours hours ago, just gets it in the afternoon and night. No current nose bleed.     History obtained from the patient.    Problem List:  2024-10: Trigger finger, acquired  2024-10: Chronic kidney disease, stage 3a (H)  2024-10: Hypercholesterolemia  2024-10: Closed fracture of left hip, initial encounter (H)  2024-10: Closed displaced intertrochanteric fracture of left femur,   initial encounter (H)  Hypercholesterolemia  Paroxysmal Atrial Fibrillation  Rosacea  Hypertension  Lichen Planus      Past Medical History:   Diagnosis Date    Hypertension        Social History     Tobacco Use    Smoking status: Former    Smokeless tobacco: Never   Substance Use Topics    Alcohol use: No       ROS is negative other than what is noted in HPI.       Vitals:    01/07/25 1335   BP: (!) 168/85   Pulse: 100   Resp: 14   Temp: 98  F (36.7  C)   TempSrc: Oral   SpO2: 98%       Physical Exam  Constitutional:       General: He is not in acute distress.     Appearance: He is not diaphoretic.   HENT:      Head: Normocephalic and atraumatic.      Right Ear: Tympanic membrane and external ear normal.      Left Ear: Tympanic membrane and external ear normal.      Nose:      Right Nostril: No epistaxis.      Left Nostril: No epistaxis.      Mouth/Throat:      Pharynx: No oropharyngeal exudate or posterior oropharyngeal erythema.   Eyes:      Conjunctiva/sclera: Conjunctivae normal.   Cardiovascular:      Rate and Rhythm: Normal rate and regular rhythm.      Heart sounds: Normal heart sounds. No murmur heard.  Pulmonary:      Effort: Pulmonary effort is normal. No respiratory distress.      Breath sounds: Normal breath sounds.   Musculoskeletal:         General: Normal range of motion.   Skin:     General: Skin is warm.      Findings: No erythema.   Neurological:      General: No focal deficit present.      Mental Status: He is alert.   Psychiatric:         Mood and Affect: Mood normal.         Thought Content: Thought  content normal.         Judgment: Judgment normal.         Results:  Results for orders placed or performed in visit on 01/07/25   CBC with platelets and differential     Status: None   Result Value Ref Range    WBC Count 8.5 4.0 - 11.0 10e3/uL    RBC Count 4.52 4.40 - 5.90 10e6/uL    Hemoglobin 14.0 13.3 - 17.7 g/dL    Hematocrit 41.6 40.0 - 53.0 %    MCV 92 78 - 100 fL    MCH 31.0 26.5 - 33.0 pg    MCHC 33.7 31.5 - 36.5 g/dL    RDW 12.7 10.0 - 15.0 %    Platelet Count 319 150 - 450 10e3/uL    % Neutrophils 75 %    % Lymphocytes 9 %    % Monocytes 13 %    % Eosinophils 2 %    % Basophils 1 %    % Immature Granulocytes 0 %    Absolute Neutrophils 6.3 1.6 - 8.3 10e3/uL    Absolute Lymphocytes 0.8 0.8 - 5.3 10e3/uL    Absolute Monocytes 1.1 0.0 - 1.3 10e3/uL    Absolute Eosinophils 0.2 0.0 - 0.7 10e3/uL    Absolute Basophils 0.1 0.0 - 0.2 10e3/uL    Absolute Immature Granulocytes 0.0 <=0.4 10e3/uL   CBC with platelets and differential     Status: None    Narrative    The following orders were created for panel order CBC with platelets and differential.  Procedure                               Abnormality         Status                     ---------                               -----------         ------                     CBC with platelets and d...[[id number]]                      Final result                 Please view results for these tests on the individual orders.         At the end of the encounter, I discussed results, diagnosis, medications. Discussed red flags for immediate return to clinic/ER, as well as indications for follow up if no improvement. Patient understood and agreed to plan. Patient was stable for discharge.    KLAUDIA Hunt [facility] CARE

## 2025-01-07 NOTE — PATIENT INSTRUCTIONS
You can continue to use nasal gel or spray for moisture. You can also use Aquaphor or vaseline to keep the area moist. I would also get a humidifier for your room. We will check your blood today to be sure your blood counts are normal. I placed a referral to your primary care provider for ongoing management.

## 2025-01-14 ENCOUNTER — TRANSFERRED RECORDS (OUTPATIENT)
Dept: HEALTH INFORMATION MANAGEMENT | Facility: CLINIC | Age: 85
End: 2025-01-14
Payer: COMMERCIAL

## 2025-02-26 ENCOUNTER — OFFICE VISIT (OUTPATIENT)
Dept: FAMILY MEDICINE | Facility: CLINIC | Age: 85
End: 2025-02-26
Payer: COMMERCIAL

## 2025-02-26 VITALS
HEART RATE: 82 BPM | OXYGEN SATURATION: 97 % | DIASTOLIC BLOOD PRESSURE: 70 MMHG | BODY MASS INDEX: 25.15 KG/M2 | SYSTOLIC BLOOD PRESSURE: 150 MMHG | WEIGHT: 196 LBS | HEIGHT: 74 IN | RESPIRATION RATE: 17 BRPM | TEMPERATURE: 97.6 F

## 2025-02-26 DIAGNOSIS — E78.00 HYPERCHOLESTEROLEMIA: ICD-10-CM

## 2025-02-26 DIAGNOSIS — Z87.310 PERSONAL HISTORY OF (HEALED) OSTEOPOROSIS FRACTURE: ICD-10-CM

## 2025-02-26 DIAGNOSIS — N18.31 CHRONIC KIDNEY DISEASE, STAGE 3A (H): ICD-10-CM

## 2025-02-26 DIAGNOSIS — Z86.59 HISTORY OF CLAUSTROPHOBIA: ICD-10-CM

## 2025-02-26 DIAGNOSIS — M84.422A PATHOLOGICAL FRACTURE OF LEFT HUMERUS, UNSPECIFIED PATHOLOGICAL CAUSE, INITIAL ENCOUNTER: ICD-10-CM

## 2025-02-26 DIAGNOSIS — Z00.00 MEDICARE ANNUAL WELLNESS VISIT, SUBSEQUENT: Primary | ICD-10-CM

## 2025-02-26 DIAGNOSIS — I10 ESSENTIAL HYPERTENSION: ICD-10-CM

## 2025-02-26 DIAGNOSIS — Z13.1 SCREENING FOR DIABETES MELLITUS: ICD-10-CM

## 2025-02-26 LAB
ERYTHROCYTE [DISTWIDTH] IN BLOOD BY AUTOMATED COUNT: 12.9 % (ref 10–15)
EST. AVERAGE GLUCOSE BLD GHB EST-MCNC: 117 MG/DL
HBA1C MFR BLD: 5.7 % (ref 0–5.6)
HCT VFR BLD AUTO: 41.2 % (ref 40–53)
HGB BLD-MCNC: 13.8 G/DL (ref 13.3–17.7)
MCH RBC QN AUTO: 30.4 PG (ref 26.5–33)
MCHC RBC AUTO-ENTMCNC: 33.5 G/DL (ref 31.5–36.5)
MCV RBC AUTO: 91 FL (ref 78–100)
PLATELET # BLD AUTO: 369 10E3/UL (ref 150–450)
RBC # BLD AUTO: 4.54 10E6/UL (ref 4.4–5.9)
WBC # BLD AUTO: 8.3 10E3/UL (ref 4–11)

## 2025-02-26 PROCEDURE — 83036 HEMOGLOBIN GLYCOSYLATED A1C: CPT | Performed by: FAMILY MEDICINE

## 2025-02-26 PROCEDURE — 85027 COMPLETE CBC AUTOMATED: CPT | Performed by: FAMILY MEDICINE

## 2025-02-26 PROCEDURE — 36415 COLL VENOUS BLD VENIPUNCTURE: CPT | Performed by: FAMILY MEDICINE

## 2025-02-26 RX ORDER — ALENDRONATE SODIUM 70 MG/1
70 TABLET ORAL
Qty: 12 TABLET | Refills: 3 | Status: SHIPPED | OUTPATIENT
Start: 2025-02-26 | End: 2025-02-26

## 2025-02-26 RX ORDER — ALENDRONATE SODIUM 70 MG/1
70 TABLET ORAL
Qty: 12 TABLET | Refills: 3 | Status: SHIPPED | OUTPATIENT
Start: 2025-02-26

## 2025-02-26 RX ORDER — METOPROLOL SUCCINATE 100 MG/1
100 TABLET, EXTENDED RELEASE ORAL DAILY
Qty: 90 TABLET | Refills: 3 | Status: SHIPPED | OUTPATIENT
Start: 2025-02-26

## 2025-02-26 ASSESSMENT — PAIN SCALES - GENERAL: PAINLEVEL_OUTOF10: NO PAIN (0)

## 2025-02-26 NOTE — PATIENT INSTRUCTIONS
"Dual-Energy X-Ray Absorptiometry (DXA) Test: About This Test  What is it?  Dual-energy X-ray absorptiometry (DXA) is a test that uses two different X-ray beams to check bone thickness (density) in your spine and hip. This information is used to estimate the strength of your bones.  Why is this test done?  Bone density testing is often done for:  People who are at risk for osteoporosis, including:  Women who are age 65 and older.  Older men.  People who take some medications, such as steroids.  People who have certain medical conditions, such as hyperparathyroidism.  People who have osteoporosis, to see how well treatment is working.  How is the test done?  You will lie on your back on a padded table. You probably can leave your clothes on, but you will remove any metal buttons or amina for the test.  You may need to lie with your legs straight or with your lower legs resting on a platform built into the table.  The machine will scan your bones and measure the amount of radiation they absorb. During this test you are exposed to a very low dose of radiation.  How long does the test take?  The DXA scan, which scans the hip and lower spine, takes about 20 minutes. Other kinds of bone density scans may take 30 to 45 minutes.  What happens after the test?  You will probably be able to go home right away.  You can go back to your usual activities right away.  Follow-up care is a key part of your treatment and safety. Be sure to make and go to all appointments, and call your doctor if you are having problems. It's also a good idea to keep a list of the medicines you take. Ask your doctor when you can expect to have your test results.  Where can you learn more?  Go to https://www.[employer].net/patiented  Enter L563 in the search box to learn more about \"Dual-Energy X-Ray Absorptiometry (DXA) Test: About This Test.\"  Current as of: September 25, 2023  Content Version: 14.3    2024 [facility]   Care " instructions adapted under license by your healthcare professional. If you have questions about a medical condition or this instruction, always ask your healthcare professional. [employer] disclaims any warranty or liability for your use of this information.

## 2025-02-26 NOTE — PROGRESS NOTES
Preventive Care Visit  [facility]  Héctor Nicholson MD, Family Medicine  Feb 26, 2025      Assessment & Plan     Medicare annual wellness visit, subsequent  Annual Wellness Visit completed.  Risk questionaire reviewed in detail.  Appropriate preventive services were discussed with this patient, including applicable screening as appropriate for fall prevention, nutrition, physical activity, tobacco-use cessation, weight loss, and cognition.  Annual Wellness Visits recommended to continue.     Essential hypertension  Hypertension reviewed.  Suboptimal control.  Blood pressure 148/70.  Continues lisinopril hydrochlorothiazide 20/25 daily.  Increase metoprolol succinate from 50 mg up to 100 mg daily.  Blood pressure check anticipated in next 12 weeks.  Goal less than 130/80 ideally.  - metoprolol succinate ER (TOPROL XL) 100 MG 24 hr tablet  Dispense: 90 tablet; Refill: 3  - TSH with free T4 reflex  - TSH with free T4 reflex    Hypercholesterolemia  Update lipid cascade today while fasting.  - Lipid panel reflex to direct LDL Non-fasting  - Lipid panel reflex to direct LDL Non-fasting    Chronic kidney disease, stage 3a (H)  History of CKD stage IIIa and will update microalbumin, CBC and renal function.  - Albumin Random Urine Quantitative with Creat Ratio  - CBC with platelets  - Comprehensive metabolic panel  - Albumin Random Urine Quantitative with Creat Ratio  - CBC with platelets  - Comprehensive metabolic panel    Personal history of (healed) osteoporosis fracture  Described osteoporotic type fracture involving left closed intertrochanteric fracture left femur status post ORIF.  Bone density scan to be completed.  - DX Bone Density    Pathological fracture of left humerus, unspecified pathological cause, initial encounter  Trial of alendronate 70 mg weekly as directed.  - DX Bone Density  - Med Therapy Management Referral  - alendronate (FOSAMAX) 70 MG tablet  Dispense: 12 tablet; Refill:  "3    Screening for diabetes mellitus  Diabetes screen completed.  - Hemoglobin A1c  - Hemoglobin A1c    History of claustrophobia  History of significant claustrophobia, stable.              BMI  Estimated body mass index is 25.51 kg/m  as calculated from the following:    Height as of this encounter: 1.867 m (6' 1.5\").    Weight as of this encounter: 88.9 kg (196 lb).             Harmony Blakely is a 84 year old, presenting for the following:  Medicare Visit (Pt is not fasting)        2025     1:14 PM   Additional Questions   Roomed by [facility]    Patient seen today for annual wellness visit.  In general doing well.  Lisinopril hydrochlorothiazide 20/25 daily for hypertension.  Also utilizing metoprolol succinate 50 mg daily.  No history of hyperlipidemia.  Closed fracture intra trochanteric left femur.  Status post ORIF.  Lower extremity swelling is resolved.  Hard of hearing.  History of claustrophobia.  Denies ongoing concerns for atrial fibrillation.  Paroxysmal atrial fibrillation noted during hospitalization postoperatively without recurrence. Comprehensive review of systems as above otherwise all negative.          Remarried \"[place]\" x 25 years   1 son -   1 daughter -   5 grandchildren   1 great-grandchild   Work: Retired x 9 years from [employer] ()   Surgeries: 12 lap cholhudson at [facility] (Dr. Wolf) after admit with pancreatitis, hepatitis, etc. (postop a. fib described without reoccurrence)   Hospitalizations: none   No smoke (quit 25 years ago after 2 ppd x 25 years)   No EtOH (no h/o problems)   Mom -  88 (CVA)   Dad -  77 (heart valve)   8 siblings - (3 ) - accidental broken neck after diving into a rock, lung CA (asbestos exposure), and cerebral hemorrhage               Health Care Directive  Patient has a Health Care Directive on file  Advance care planning document is on file and is current.      2025   General Health   How would you " rate your overall physical health? Good         2/26/2025   Nutrition   Diet: Regular (no restrictions)          No data to display                      No data to display                   No data to display                   No data to display                     No data to display                     Today's PHQ-2 Score:       2/26/2025     1:21 PM   PHQ-2 ( 1999 [employer])   Q1: Little interest or pleasure in doing things 0   Q2: Feeling down, depressed or hopeless 0   PHQ-2 Score 0            No data to display              Social History     Tobacco Use    Smoking status: Former    Smokeless tobacco: Never   Substance Use Topics    Alcohol use: No    Drug use: No                 Reviewed and updated as needed this visit by Provider   Tobacco  Allergies  Meds  Problems  Med Hx  Surg Hx  Fam Hx            Past Medical History:   Diagnosis Date    Hypertension      Past Surgical History:   Procedure Laterality Date    OPEN REDUCTION INTERNAL FIXATION HIP NAILING Left 10/27/2024    Procedure: INTERNAL FIXATION, FRACTURE, TROCHANTERIC, HIP, USING TRACY;  Surgeon: Petr Martini MD, DO;  [place] [facility] [place]     Lab work is in process  Labs reviewed in EPIC  BP Readings from Last 3 Encounters:   02/26/25 (!) 150/70   01/07/25 (!) 168/85   12/27/24 (!) 161/68    Wt Readings from Last 3 Encounters:   02/26/25 88.9 kg (196 lb)   12/27/24 88.5 kg (195 lb)   11/15/24 91.2 kg (201 lb)                  Patient Active Problem List   Diagnosis    Hypercholesterolemia    Paroxysmal Atrial Fibrillation    Rosacea    Trigger finger, acquired    Hypertension    Lichen Planus    Closed fracture of left hip, initial encounter (H)    Closed displaced intertrochanteric fracture of left femur, initial encounter (H)    Chronic kidney disease, stage 3a (H)    Hypercholesterolemia     Past Surgical History:   Procedure Laterality Date    OPEN REDUCTION INTERNAL FIXATION HIP NAILING Left 10/27/2024    Procedure: INTERNAL  FIXATION, FRACTURE, TROCHANTERIC, HIP, USING TRACY;  Surgeon: Petr Martini MD, DO;  Location: [facility] Main OR       Social History     Tobacco Use    Smoking status: Former    Smokeless tobacco: Never   Substance Use Topics    Alcohol use: No     History reviewed. No pertinent family history.      Current Outpatient Medications   Medication Sig Dispense Refill    acetaminophen (TYLENOL) 325 MG tablet Take 2 tablets (650 mg) by mouth every 4 hours as needed for mild pain. 100 tablet 0    alendronate (FOSAMAX) 70 MG tablet Take 1 tablet (70 mg) by mouth every 7 days. Take the medication on an empty stomach, first thing in the morning with at least 8 ounces of water--do not take with other drinks. Remain upright (do not lie down) and do not eat or take anything else by mouth (including other pills) until at least 30 minutes after taking the medication. 12 tablet 3    aspirin 81 MG EC tablet Take 1 tablet (81 mg) by mouth 2 times daily. 60 tablet 0    lisinopril-hydrochlorothiazide (ZESTORETIC) 20-25 MG tablet Take 1 tablet by mouth daily. 90 tablet 3    metoprolol succinate ER (TOPROL XL) 100 MG 24 hr tablet Take 1 tablet (100 mg) by mouth daily. 90 tablet 3    MULTIVITS-MIN/HRB  (URINOZINC PROSTATE FORMULA ORAL) Take 1 tablet by mouth daily.       Allergies   Allergen Reactions    Levaquin [Levofloxacin] Rash     Now      Current providers sharing in care for this patient include:  Patient Care Team:  Héctor Nicholson MD as PCP - General (Family Practice)  James Hamilton MD as Assigned PCP    The following health maintenance items are reviewed in Epic and correct as of today:  Health Maintenance   Topic Date Due    MICROALBUMIN  Never done    Pneumococcal Vaccine: 50+ Years (1 of 2 - PCV) Never done    ZOSTER IMMUNIZATION (2 of 2) 05/25/2017    RSV VACCINE (1 - 1-dose 75+ series) Never done    LIPID  01/17/2024    INFLUENZA VACCINE (1) 09/01/2024    COVID-19 Vaccine (3 - 2024-25 season) 09/01/2024     "BMP  11/15/2025    FALL RISK ASSESSMENT  11/15/2025    HEMOGLOBIN  01/07/2026    MEDICARE ANNUAL WELLNESS VISIT  02/26/2026    ANNUAL REVIEW OF HM ORDERS  02/26/2026    DTAP/TDAP/TD IMMUNIZATION (2 - Td or Tdap) 03/30/2027    ADVANCE CARE PLANNING  02/26/2030    PHQ-2 (once per calendar year)  Completed    URINALYSIS  Completed    HPV IMMUNIZATION  Aged Out    MENINGITIS IMMUNIZATION  Aged Out         Review of Systems  Constitutional, HEENT, cardiovascular, pulmonary, GI, , musculoskeletal, neuro, skin, endocrine and psych systems are negative, except as otherwise noted.     Objective    Exam  BP (!) 150/70   Pulse 82   Temp 97.6  F (36.4  C)   Resp 17   Ht 1.867 m (6' 1.5\")   Wt 88.9 kg (196 lb)   SpO2 97%   BMI 25.51 kg/m     Estimated body mass index is 25.51 kg/m  as calculated from the following:    Height as of this encounter: 1.867 m (6' 1.5\").    Weight as of this encounter: 88.9 kg (196 lb).    Physical Exam    GENERAL: alert and no distress.  Hard of hearing.  EYES: Eyes grossly normal to inspection, PERRL and conjunctivae and sclerae normal  HENT: ear canals and TM's normal, nose and mouth without ulcers or lesions  NECK: no adenopathy, no asymmetry, masses, or scars  RESP: lungs clear to auscultation - no rales, rhonchi or wheezes  CV: regular rate and rhythm, normal S1 S2, no S3 or S4, no murmur, click or rub, no peripheral edema  ABDOMEN: soft, nontender, no hepatosplenomegaly, no masses and bowel sounds normal   (male): deferred  RECTAL: deferred  MS: no gross musculoskeletal defects noted, no edema  SKIN: no suspicious lesions or rashes  NEURO: Normal strength and tone, mentation intact and speech normal  PSYCH: mentation appears normal, affect normal/bright        2/26/2025   Mini Cog   Clock Draw Score 2 Normal   3 Item Recall 2 objects recalled   Mini Cog Total Score 4              Signed Electronically by: Héctor Nicholson MD    "

## 2025-02-27 LAB
ALBUMIN SERPL BCG-MCNC: 4.2 G/DL (ref 3.5–5.2)
ALP SERPL-CCNC: 95 U/L (ref 40–150)
ALT SERPL W P-5'-P-CCNC: 15 U/L (ref 0–70)
ANION GAP SERPL CALCULATED.3IONS-SCNC: 10 MMOL/L (ref 7–15)
AST SERPL W P-5'-P-CCNC: 20 U/L (ref 0–45)
BILIRUB SERPL-MCNC: 0.5 MG/DL
BUN SERPL-MCNC: 23.9 MG/DL (ref 8–23)
CALCIUM SERPL-MCNC: 9.6 MG/DL (ref 8.8–10.4)
CHLORIDE SERPL-SCNC: 102 MMOL/L (ref 98–107)
CHOLEST SERPL-MCNC: 189 MG/DL
CREAT SERPL-MCNC: 1.09 MG/DL (ref 0.67–1.17)
CREAT UR-MCNC: 130 MG/DL
EGFRCR SERPLBLD CKD-EPI 2021: 68 ML/MIN/1.73M2
FASTING STATUS PATIENT QL REPORTED: NO
FASTING STATUS PATIENT QL REPORTED: NO
GLUCOSE SERPL-MCNC: 104 MG/DL (ref 70–99)
HCO3 SERPL-SCNC: 27 MMOL/L (ref 22–29)
HDLC SERPL-MCNC: 64 MG/DL
LDLC SERPL CALC-MCNC: 108 MG/DL
MICROALBUMIN UR-MCNC: 13 MG/L
MICROALBUMIN/CREAT UR: 10 MG/G CR (ref 0–17)
NONHDLC SERPL-MCNC: 125 MG/DL
POTASSIUM SERPL-SCNC: 4.5 MMOL/L (ref 3.4–5.3)
PROT SERPL-MCNC: 7.4 G/DL (ref 6.4–8.3)
SODIUM SERPL-SCNC: 139 MMOL/L (ref 135–145)
TRIGL SERPL-MCNC: 87 MG/DL
TSH SERPL DL<=0.005 MIU/L-ACNC: 0.87 UIU/ML (ref 0.3–4.2)

## 2025-03-06 ENCOUNTER — HOSPITAL ENCOUNTER (EMERGENCY)
Facility: HOSPITAL | Age: 85
Discharge: HOME | End: 2025-03-06
Attending: STUDENT IN AN ORGANIZED HEALTH CARE EDUCATION/TRAINING PROGRAM | Admitting: STUDENT IN AN ORGANIZED HEALTH CARE EDUCATION/TRAINING PROGRAM
Payer: COMMERCIAL

## 2025-03-06 VITALS
WEIGHT: 196 LBS | BODY MASS INDEX: 25.51 KG/M2 | RESPIRATION RATE: 16 BRPM | HEART RATE: 67 BPM | OXYGEN SATURATION: 98 % | TEMPERATURE: 97.8 F | DIASTOLIC BLOOD PRESSURE: 93 MMHG | SYSTOLIC BLOOD PRESSURE: 160 MMHG

## 2025-03-06 DIAGNOSIS — R04.0 EPISTAXIS: ICD-10-CM

## 2025-03-06 PROCEDURE — 99283 EMERGENCY DEPT VISIT LOW MDM: CPT

## 2025-03-06 ASSESSMENT — COLUMBIA-SUICIDE SEVERITY RATING SCALE - C-SSRS
1. IN THE PAST MONTH, HAVE YOU WISHED YOU WERE DEAD OR WISHED YOU COULD GO TO SLEEP AND NOT WAKE UP?: NO
2. HAVE YOU ACTUALLY HAD ANY THOUGHTS OF KILLING YOURSELF IN THE PAST MONTH?: NO
6. HAVE YOU EVER DONE ANYTHING, STARTED TO DO ANYTHING, OR PREPARED TO DO ANYTHING TO END YOUR LIFE?: NO

## 2025-03-07 ENCOUNTER — TELEPHONE (OUTPATIENT)
Dept: FAMILY MEDICINE | Facility: CLINIC | Age: 85
End: 2025-03-07
Payer: COMMERCIAL

## 2025-03-07 NOTE — TELEPHONE ENCOUNTER
General Call      Reason for Call:  Patient concerned he is not able to get in with ENT until 6 months. I did give him their number to call and see if they can get him in sooner, but he wanted to relay to provider for any further assistance.     What are your questions or concerns:  ENT    Date of last appointment with provider: 02/26/2025    Could we send this information to you in [employer] or would you prefer to receive a phone call?:   Patient would prefer a phone call   Okay to leave a detailed message?: Yes at Home number on file [phone] (home)

## 2025-03-07 NOTE — DISCHARGE INSTRUCTIONS
As we discussed, if you begin to have more nosebleeds at home, hold pressure for at least half an hour to 45 minutes.  You may try a cold rag in the freezer to help with vasoconstriction.    I would also try Afrin at least 2 to 4 sprays up the nare that is bleeding.  Continue to hold pressure.    If you continue to bleed after an hour to an hour and a half return to the hospital for repeat evaluation.

## 2025-03-07 NOTE — ED TRIAGE NOTES
The pt presents for evaluation of nose bleed. He reports it is slowing down now. No active bleeding noted in triage. Hx of frequent nose bleeds over the past few days.

## 2025-03-07 NOTE — ED PROVIDER NOTES
Emergency Department Encounter         FINAL IMPRESSION:  Epistaxis        ED COURSE AND MEDICAL DECISION MAKING       ED Course as of 03/06/25 2325   Thu Mar 06, 2025   2321 84-year-old male here with intermittent nosebleeds over the last few weeks.  States it only is coming out of the left nare.  No fevers chills nausea vomiting.  No trauma.  On arrival he looks well.  Has no bleeding.  Family reports they have been using nasal saline spray as well as humidifiers.  Examination is unremarkable.  I do not see any lesions on the anterior septum that would be able to be cauterized.  He has no signs of posterior bleed.  Is hemodynamically stable.  Not on thinners.  Recommending continued saline spray and humidifiers at home.  Recommended adding Afrin if he begins to bleed again at home along with appropriate pressure.  ENT referral placed.       Additional ED Course Timeline:  11:11 PM I met with the patient, obtained history, performed an initial exam, and discussed options and plan for diagnostics and treatment here in the ED.                        Medical Decision Making  Obtained supplemental history:Supplemental history obtained?: No  Reviewed external records: External records reviewed?: Documented in chart  Care impacted by chronic illness:Chronic Kidney Disease and Hypertension  Did you consider but not order tests?: Work up considered but not performed and documented in chart, if applicable  Did you interpret images independently?: Independent interpretation of ECG and images noted in documentation, when applicable.  Consultation discussion with other provider:Did you involve another provider (consultant, MH, pharmacy, etc.)?: No  Discharge. No recommendations on prescription strength medication(s). See documentation for any additional details.    MIPS (CTPE, Dental pain, Leach, Sinusitis, Asthma/COPD, Head Trauma): Not Applicable                  MEDICATIONS GIVEN IN THE EMERGENCY DEPARTMENT:  Medications  - No data to display    NEW PRESCRIPTIONS STARTED AT TODAY'S ED VISIT:  New Prescriptions    No medications on file       HPI     Patient information obtained from: The patient    Use of : N/A     Reddy Allen is a 84 year old male with a pertinent history of HTN, CKD stage 3, who presents to this ED via walk-in for evaluation of nose bleed.    The patient developed a nose bleed from the left nostril for the past hour. It stopped bleeding while waiting in the ER. He reports this is the 4th nosebleed in the past week. He has been using saline spray and humidifier. He is not on blood thinners. No history of bleeding disorder. No complaints of any other associated symptoms.        REVIEW OF SYSTEMS:  See HPI          MEDICAL HISTORY     Past Medical History:   Diagnosis Date    Hypertension        Past Surgical History:   Procedure Laterality Date    OPEN REDUCTION INTERNAL FIXATION HIP NAILING Left 10/27/2024    Procedure: INTERNAL FIXATION, FRACTURE, TROCHANTERIC, HIP, USING TRACY;  Surgeon: Petr Martini MD, DO;  Location: [facility] Main OR       Social History     Tobacco Use    Smoking status: Former    Smokeless tobacco: Never   Substance Use Topics    Alcohol use: No    Drug use: No       acetaminophen (TYLENOL) 325 MG tablet  alendronate (FOSAMAX) 70 MG tablet  aspirin 81 MG EC tablet  lisinopril-hydrochlorothiazide (ZESTORETIC) 20-25 MG tablet  metoprolol succinate ER (TOPROL XL) 100 MG 24 hr tablet  MULTIVITS-MIN/HRB  (URINOZINC PROSTATE FORMULA ORAL)            PHYSICAL EXAM     BP (!) 160/93   Pulse 67   Temp 97.8  F (36.6  C) (Temporal)   Resp 16   Wt 88.9 kg (196 lb)   SpO2 98%   BMI 25.51 kg/m        PHYSICAL EXAM:     General: Patient appears well, nontoxic, comfortable  HEENT: Moist mucous membranes,  No head trauma.  No uvular deviation, no tonsillar asymmetry, no stridor, no drooling, no exudates, no redness. left nare without any bleeding.  No signs of posterior  bleed.  Musculoskeletal: Full range of motion of joints, no deformities appreciated.  Neurological: Alert and oriented, grossly neurologically intact.  Psychological: Normal affect and mood.  Integument: No rashes appreciated          RESULTS       Labs Ordered and Resulted from Time of ED Arrival to Time of ED Departure - No data to display    No orders to display                     PROCEDURES:  Procedures:  Procedures       IBo am serving as a scribe to document services personally performed by Raymond Hilliard DO, based on my observations and the provider's statements to me.  I, Raymond Hilliard DO, attest that Bo Dennison is acting in a scribe capacity, has observed my performance of the services and has documented them in accordance with my direction.    Raymond Hilliard DO  Emergency Medicine  [facility] EMERGENCY DEPARTMENT       Raymond Hilliard DO  03/06/25 [id number]

## 2025-03-11 NOTE — TELEPHONE ENCOUNTER
Writer called and spoke to patient's wife, CTC on file, and infomred her that patient is on waitlist, but PCP recommends possibly looking at other locations. Patient's wife took information and writer also provided number for [facility]. Wife states she will inform patient and if they need a referral sent to different location they will reach back out to clinic.     JEANETTE RushN, RN  [facility]

## 2025-03-11 NOTE — TELEPHONE ENCOUNTER
Agree with patient getting on cancellation list.  Otherwise, which clinic is he scheduled with?  Can he get seen through a different ENT clinic sooner?  [facility], etc.?

## 2025-03-17 ENCOUNTER — OFFICE VISIT (OUTPATIENT)
Dept: PHARMACY | Facility: CLINIC | Age: 85
End: 2025-03-17
Attending: FAMILY MEDICINE
Payer: COMMERCIAL

## 2025-03-17 VITALS — OXYGEN SATURATION: 97 % | DIASTOLIC BLOOD PRESSURE: 84 MMHG | SYSTOLIC BLOOD PRESSURE: 152 MMHG | HEART RATE: 71 BPM

## 2025-03-17 DIAGNOSIS — M80.00XD AGE-RELATED OSTEOPOROSIS WITH CURRENT PATHOLOGICAL FRACTURE WITH ROUTINE HEALING, SUBSEQUENT ENCOUNTER: Primary | ICD-10-CM

## 2025-03-17 DIAGNOSIS — I10 ESSENTIAL HYPERTENSION: ICD-10-CM

## 2025-03-17 DIAGNOSIS — Z78.9 TAKES DIETARY SUPPLEMENTS: ICD-10-CM

## 2025-03-17 DIAGNOSIS — E78.00 PURE HYPERCHOLESTEROLEMIA: ICD-10-CM

## 2025-03-17 DIAGNOSIS — R52 PAIN: ICD-10-CM

## 2025-03-17 DIAGNOSIS — Z86.79 HISTORY OF ATRIAL FIBRILLATION: ICD-10-CM

## 2025-03-17 PROCEDURE — 3079F DIAST BP 80-89 MM HG: CPT | Performed by: PHARMACIST

## 2025-03-17 PROCEDURE — 99607 MTMS BY PHARM ADDL 15 MIN: CPT | Performed by: PHARMACIST

## 2025-03-17 PROCEDURE — 3077F SYST BP >= 140 MM HG: CPT | Performed by: PHARMACIST

## 2025-03-17 PROCEDURE — 99605 MTMS BY PHARM NP 15 MIN: CPT | Performed by: PHARMACIST

## 2025-03-17 RX ORDER — ACETAMINOPHEN 500 MG
500-1000 TABLET ORAL EVERY 6 HOURS PRN
COMMUNITY

## 2025-03-17 NOTE — LETTER
"Recommended To-Do List      Prepared on: Mar 17, 2025       You can get the best results from your medications by completing the items on this \"To-Do List.\"      Bring your To-Do List when you go to your doctor. And, share it with your family or caregivers.    My To-Do List:  What we talked about: What I should do:   A new medication that may be of benefit to you    Start calcium supplement: goal 400-500mg twice daily --- total daily amount of calcium recommended is 1200mg/day, you are already getting some of that in your vitamins/supplements               "

## 2025-03-17 NOTE — PATIENT INSTRUCTIONS
"Recommendations from today's MTM visit:                                                    MTM (medication therapy management) is a service provided by a clinical pharmacist designed to help you get the most of out of your medicines.   Today we reviewed what your medicines are for, how to know if they are working, that your medicines are safe and how to make your medicine regimen as easy as possible.      I will contact imaging scheduling to see if someone can reach back out to schedule DEXA scan   Schedule dental exam before starting alendronate - if any dental work is needed we want to get this done before starting a medication    Start calcium supplement: goal 400-500mg twice daily --- total daily amount of calcium recommended is 1200mg/day, you are already getting some of that in your vitamins/supplements   You do not need to take additional vitamin D - you are already getting goal of 1000 international unit(s) with your current supplements    Blood pressure - monitor at home at least 2-3 times per week    Follow-up: Return in 8 weeks (on 5/12/2025) for Follow up, with me, in person. - if needed, we can reschedule depending on timing of DEXA and dental exam    It was great speaking with you today.  I value your experience and would be very thankful for your time in providing feedback in our clinic survey. In the next few days, you may receive an email or text message from [employer] with a link to a survey related to your  clinical pharmacist.\"     To schedule another MTM appointment, please call the clinic directly or you may call the MTM scheduling line at [phone].    My Clinical Pharmacist's contact information:                                                      Please feel free to contact me with any questions or concerns you have.      Lolita Esparza PharmD  Medication Therapy Management (MTM) Pharmacist   [facility] [place]   "

## 2025-03-17 NOTE — PROGRESS NOTES
Medication Therapy Management (MTM) Encounter    ASSESSMENT:                            Medication Adherence/Access: No issues identified.    Bone Health   Osteoporosis - history of fracture  Patient is not meeting RDI of calcium 1200mg/day -recommend patient's start calcium supplement, 400 to 500 mg twice daily to help with absorption. Patient is exceeding RDI of Vitamin D 1000 IU/day. Patient would benefit from DEXA scan-primary care provider ordered this and imaging scheduling documented attempting to contact patient to schedule though patient denies ever receiving a call or voicemail with number to call back and schedule.  Recommend patient hold off on starting alendronate due to need for dental exam.     Hypertension /History of A.Fib  Patient is not meeting blood pressure goal of < 140/90mmHg, heart rate was within normal limits today in clinic.  Recent increase in metoprolol dose, some benefit noted though patient may need additional pharmacotherapy to meet goal.  Recommend patient start monitoring blood pressure 2-3 times a week to gauge control at home.  Future consideration to increase lisinopril dose.  With recent emergency department visit due to recurrent nosebleeds, recommend continuing off aspirin therapy.  Patient with history of atrial fibrillation though noted to be without recurrence.  Recommend discussing with primary care provider need for aspirin therapy.    Pain    Stable, as needed acetaminophen is effective.    Supplements /over-the-counter  Stable, okay to continue prostate advanced supplement if this has been helpful.      PLAN:                            I will contact imaging scheduling to see if someone can reach back out to schedule DEXA scan   Schedule dental exam before starting alendronate - if any dental work is needed we want to get this done before starting a medication    Start calcium supplement: goal 400-500mg twice daily --- total daily amount of calcium recommended is  "1200mg/day, you are already getting some of that in your vitamins/supplements   You do not need to take additional vitamin D - you are already getting goal of 1000 international unit(s) with your current supplements    Blood pressure - monitor at home at least 2-3 times per week     Follow-up: Return in 8 weeks (on 5/12/2025) for Follow up, with me, in person.    SUBJECTIVE/OBJECTIVE:                          Reddy Allen is a 84 year old male seen for an initial visit. He was referred to me from Héctor Nicholson MD.      Reason for visit: medication review - osteoporosis.    Allergies/ADRs: Reviewed in chart  Past Medical History: Reviewed in chart  Tobacco: He reports that he has quit smoking. He has never used smokeless tobacco.  Alcohol: none    Medication Adherence/Access: no issues reported    Bone Health   Osteoporosis - history of fracture  alendronate (Fosamax) 70mg weekly - patient reports he read about the side effects of this medication and decided to hold off on starting until talking with MTM pharmacist  Patient's main concerns with bisphosphonate therapy is risk of osteonecrosis of the jaw since he has had a number of teeth pulled in the past.  Reports last seeing a dentist about 3 years ago.  He is also concerned this may contribute to worsening pain/muscle spasms.  DEXA History: none - pending, primary care provider ordered, scheduling noted on order they tried to contact patient though patient denies receiving phone call or message on his voicemail to schedule.  Patient is getting minimal dietary calcium in their diet. Total amount from supplement at this time: 370mg.     Risk factors: recent fracture  In 10/2024, patient fell at [facility] by tripping over a stool. He sustained an acute left displaced and varus angulated intertrochanteric left proximal femur fracture.  Patient currently using a cane to support him when mobilizing.  Per primary care provider encounter 2/26/25 - \"Described " "osteoporotic type fracture involving left closed intertrochanteric fracture left femur status post ORIF\"   Patient reports he does exercise by walking on a treadmill, lifting weights as able.       Hypertension /History of A.Fib  Lisinopril/hydrochlorothiazide 20-25mg daily  Metoprolol ER 100mg daily - increased dose about 2 weeks ago   Patient reports no current medication side effects  Patient does not self-monitor blood pressure.  Patient reports he has a cuff at home so is able to do this if needed.   Paroxysmal atrial fibrillation noted during hospitalization postoperatively without recurrence.   Patient reports he stopped aspirin therapy after recurrent nosebleeds, which resulted in emergency room visit on 3/6/2025.  Denies nosebleeds since then.  [id number] Score: 3 points, which represents a 3.2% annual risk of major embolic event, without anti-coagulation or an LAAO device.     BP Readings from Last 3 Encounters:   03/17/25 (!) 152/84   03/06/25 (!) 160/93   02/26/25 (!) 150/70     Pulse Readings from Last 3 Encounters:   03/17/25 71   03/06/25 67   02/26/25 82          Pain    Acetaminophen 500-1000mg every 6 hours as needed - for general aches and pain, patient reports he does have numbness in his left toes and sometimes gets shooting pain down the left leg (s/p ORIF on 10/27/24)   Patient feels that current therapy is effective.   Patient reports the following side effects: None.    Supplements /over-the-counter  Centrum multivitamin two chews daily - Has vitamin D3 25mcg, calcium 80mg per serving   Prostate Advanced 3 tablets daily - has 290mg of calcium, 1680 international unit(s) of vitamin D -patient reports this has been very helpful in decreasing the number of times he has to wake up during the night to urinate  Total Beets - blood pressure support: 650mg daily   Coricidin HBP cough/cold 1 tab daily as needed -patient takes when he feels like he has a cold or cough coming on, rarely uses  No " reported issues at this time.         Today's Vitals: BP (!) 152/84   Pulse 71   SpO2 97%   ----------------    I spent 50 minutes with this patient today. All changes were made via collaborative practice agreement with Héctor Nicholson MD.     A summary of these recommendations was given to the patient.    Ronnie AnguianoD  Medication Therapy Management (MTM) Pharmacist   [facility]     Medication Therapy Recommendations  Age-related osteoporosis with current pathological fracture with routine healing, subsequent encounter   1 Current Medication: Multiple Vitamins-Minerals (CENTRUM MINIS MEN 50+ PO)   Current Medication Sig: Take 2 chew tab by mouth daily. Has vitamin D3 25mcg, calcium 80mg per serving   Rationale: Synergistic therapy - Needs additional medication therapy - Indication   Recommendation: Start Medication - CALCIUM 500 PO   Status: Patient Agreed - Adherence/Education   Identified Date: 3/17/2025 Completed Date: 3/17/2025

## 2025-03-17 NOTE — LETTER
March 17, 2025  Reddy HDZ Alejandro  [address]  [place] [address]    Dear Mr. Allen, [facility]     Thank you for talking with me on Mar 17, 2025 about your health and medications. As a follow-up to our conversation, I have included two documents:      Your Recommended To-Do List has steps you should take to get the best results from your medications.  Your Medication List will help you keep track of your medications and how to take them.    If you want to talk about these documents, please call Lolita Esparza RPH at phone: [phone], Monday-Friday 8-4:30pm.    I look forward to working with you and your doctors to make sure your medications work well for you.    Sincerely,  Lolita Esparza RPH  [facility] Pharmacist, [facility]

## 2025-03-17 NOTE — Clinical Note
Hello - we are holding off on starting alendronate therapy until patient can get a dental exam.  He may need to have some teeth pulled before being able to start this medication.  Unclear what happened with scheduling DEXA since patient reports he never received a call though documented in the order that he was called.  I will try to connect him with scheduling to address this.  He has vitamin D and his other supplements so just adding additional calcium supplement to meet 1200 mg/day.  Blood pressure a little elevated in clinic today though some improvement with metoprolol dose increase.  I recommended he monitor at home to compare to clinic readings.

## 2025-03-17 NOTE — LETTER
_  Medication List        Prepared on: Mar 17, 2025     Bring your Medication List when you go to the doctor, hospital, or   emergency room. And, share it with your family or caregivers.     Note any changes to how you take your medications.  Cross out medications when you no longer use them.    Medication How I take it Why I use it Prescriber   acetaminophen (TYLENOL) 500 MG tablet Take 500-1,000 mg by mouth every 6 hours as needed for mild pain.  Pain Patient Reported   alendronate (FOSAMAX) 70 MG tablet DO NOT START TAKING UNTIL DIRECTED TO DO SO AFTER DENTAL EXAM    Take 1 tablet (70 mg) by mouth every 7 days. Take the medication on an empty stomach, first thing in the morning with at least 8 ounces of water--do not take with other drinks. Remain upright (do not lie down) and do not eat or take anything else by mouth (including other pills) until at least 30 minutes after taking the medication. Pathological fracture of left humerus, unspecified pathological cause, initial encounter Héctor Nicholson MD   Chlorpheniramine-DM (CORICIDIN HBP COUGH/COLD PO) Take 1 tablet by mouth daily as needed (cough/cold).  Cough/cold Patient Reported   lisinopril-hydrochlorothiazide (ZESTORETIC) 20-25 MG tablet Take 1 tablet by mouth daily. Essential Hypertension James Hamilton MD   metoprolol succinate ER (TOPROL XL) 100 MG 24 hr tablet Take 1 tablet (100 mg) by mouth daily. Essential Hypertension Héctor Nicholson MD   Misc Natural Products (BEET ROOT PO) Take 650 mg by mouth daily. Total Beets - blood pressure support  General Health  Patient Reported   Multiple Vitamins-Minerals (CENTRUM MINIS MEN 50+ PO) Take 2 chew tab by mouth daily. Has vitamin D3 25mcg, calcium 80mg per serving  General Health  Patient Reported   UNABLE TO FIND Take 3 tablets by mouth daily. MEDICATION NAME: Prostate Advanced - has 290mg of calcium, 1680 international unit(s) of vitamin D  Prostate Patient Reported         Add new medications,  over-the-counter drugs, herbals, vitamins, or  minerals in the blank rows below.    Medication How I take it Why I use it Prescriber                                      Allergies:      - Levaquin [levofloxacin] - Rash        Side effects I have had:      Not on File        Other Information:              My notes and questions:

## 2025-05-06 ENCOUNTER — ANCILLARY PROCEDURE (OUTPATIENT)
Dept: BONE DENSITY | Facility: CLINIC | Age: 85
End: 2025-05-06
Attending: FAMILY MEDICINE
Payer: COMMERCIAL

## 2025-05-06 DIAGNOSIS — Z87.310 PERSONAL HISTORY OF (HEALED) OSTEOPOROSIS FRACTURE: ICD-10-CM

## 2025-05-06 DIAGNOSIS — M84.422A PATHOLOGICAL FRACTURE OF LEFT HUMERUS, UNSPECIFIED PATHOLOGICAL CAUSE, INITIAL ENCOUNTER: ICD-10-CM

## 2025-05-06 PROCEDURE — 77081 DXA BONE DENSITY APPENDICULR: CPT | Mod: TC | Performed by: PHYSICIAN ASSISTANT

## 2025-05-06 PROCEDURE — 77091 TBS TECHL CALCULATION ONLY: CPT | Performed by: PHYSICIAN ASSISTANT

## 2025-05-06 PROCEDURE — 77080 DXA BONE DENSITY AXIAL: CPT | Mod: TC | Performed by: PHYSICIAN ASSISTANT

## 2025-05-27 ENCOUNTER — RESULTS FOLLOW-UP (OUTPATIENT)
Dept: FAMILY MEDICINE | Facility: CLINIC | Age: 85
End: 2025-05-27

## 2025-05-27 ENCOUNTER — OFFICE VISIT (OUTPATIENT)
Dept: FAMILY MEDICINE | Facility: CLINIC | Age: 85
End: 2025-05-27
Payer: COMMERCIAL

## 2025-05-27 VITALS
HEART RATE: 82 BPM | RESPIRATION RATE: 15 BRPM | WEIGHT: 189 LBS | DIASTOLIC BLOOD PRESSURE: 70 MMHG | TEMPERATURE: 97.6 F | SYSTOLIC BLOOD PRESSURE: 130 MMHG | HEIGHT: 73 IN | OXYGEN SATURATION: 97 % | BODY MASS INDEX: 25.05 KG/M2

## 2025-05-27 DIAGNOSIS — E78.00 PURE HYPERCHOLESTEROLEMIA: ICD-10-CM

## 2025-05-27 DIAGNOSIS — M25.50 ARTHRALGIA OF MULTIPLE JOINTS: Primary | ICD-10-CM

## 2025-05-27 DIAGNOSIS — I10 ESSENTIAL HYPERTENSION: ICD-10-CM

## 2025-05-27 DIAGNOSIS — N18.31 CHRONIC KIDNEY DISEASE, STAGE 3A (H): ICD-10-CM

## 2025-05-27 DIAGNOSIS — R73.01 IMPAIRED FASTING GLUCOSE: ICD-10-CM

## 2025-05-27 LAB
ERYTHROCYTE [DISTWIDTH] IN BLOOD BY AUTOMATED COUNT: 13.1 % (ref 10–15)
ERYTHROCYTE [SEDIMENTATION RATE] IN BLOOD BY WESTERGREN METHOD: 41 MM/HR (ref 0–20)
EST. AVERAGE GLUCOSE BLD GHB EST-MCNC: 123 MG/DL
HBA1C MFR BLD: 5.9 % (ref 0–5.6)
HCT VFR BLD AUTO: 38.4 % (ref 40–53)
HGB BLD-MCNC: 12.6 G/DL (ref 13.3–17.7)
MCH RBC QN AUTO: 29.3 PG (ref 26.5–33)
MCHC RBC AUTO-ENTMCNC: 32.8 G/DL (ref 31.5–36.5)
MCV RBC AUTO: 89 FL (ref 78–100)
PLATELET # BLD AUTO: 316 10E3/UL (ref 150–450)
RBC # BLD AUTO: 4.3 10E6/UL (ref 4.4–5.9)
WBC # BLD AUTO: 8 10E3/UL (ref 4–11)

## 2025-05-27 PROCEDURE — 83036 HEMOGLOBIN GLYCOSYLATED A1C: CPT | Performed by: FAMILY MEDICINE

## 2025-05-27 PROCEDURE — 3044F HG A1C LEVEL LT 7.0%: CPT | Performed by: FAMILY MEDICINE

## 2025-05-27 PROCEDURE — 3078F DIAST BP <80 MM HG: CPT | Performed by: FAMILY MEDICINE

## 2025-05-27 PROCEDURE — 1126F AMNT PAIN NOTED NONE PRSNT: CPT | Performed by: FAMILY MEDICINE

## 2025-05-27 PROCEDURE — 86140 C-REACTIVE PROTEIN: CPT | Performed by: FAMILY MEDICINE

## 2025-05-27 PROCEDURE — 36415 COLL VENOUS BLD VENIPUNCTURE: CPT | Performed by: FAMILY MEDICINE

## 2025-05-27 PROCEDURE — 86431 RHEUMATOID FACTOR QUANT: CPT | Performed by: FAMILY MEDICINE

## 2025-05-27 PROCEDURE — 3075F SYST BP GE 130 - 139MM HG: CPT | Performed by: FAMILY MEDICINE

## 2025-05-27 PROCEDURE — 85652 RBC SED RATE AUTOMATED: CPT | Performed by: FAMILY MEDICINE

## 2025-05-27 PROCEDURE — 86038 ANTINUCLEAR ANTIBODIES: CPT | Performed by: FAMILY MEDICINE

## 2025-05-27 PROCEDURE — 84550 ASSAY OF BLOOD/URIC ACID: CPT | Performed by: FAMILY MEDICINE

## 2025-05-27 PROCEDURE — 85027 COMPLETE CBC AUTOMATED: CPT | Performed by: FAMILY MEDICINE

## 2025-05-27 PROCEDURE — 99214 OFFICE O/P EST MOD 30 MIN: CPT | Performed by: FAMILY MEDICINE

## 2025-05-27 PROCEDURE — 80053 COMPREHEN METABOLIC PANEL: CPT | Performed by: FAMILY MEDICINE

## 2025-05-27 PROCEDURE — G2211 COMPLEX E/M VISIT ADD ON: HCPCS | Performed by: FAMILY MEDICINE

## 2025-05-27 ASSESSMENT — PAIN SCALES - GENERAL: PAINLEVEL_OUTOF10: NO PAIN (0)

## 2025-05-27 NOTE — PROGRESS NOTES
Assessment/Plan:    Arthralgia of multiple joints  Multiple arthralgias described more hands and wrists as well as shoulders at times.  Worse in the morning and better in the afternoon.  Tylenol does not help.  Hoping to use Voltaren.  Lab assessment completed.  Elevated sedimentation rate noted.  Rheumatology referral was placed as well.  - CRP inflammation  - Uric acid  - CBC with platelets  - Comprehensive metabolic panel  - Erythrocyte sedimentation rate auto  - Anti Nuclear Tamiko IgG by IFA with Reflex  - Rheumatoid factor  - CRP inflammation  - Uric acid  - CBC with platelets  - Comprehensive metabolic panel  - Erythrocyte sedimentation rate auto  - Anti Nuclear Tamiko IgG by IFA with Reflex  - Rheumatoid factor  - Adult Rheumatology  Referral    Essential hypertension  Hypertension reviewed.  Continues lisinopril hydrochlorothiazide 20/25 daily plus metoprolol succinate 100 mg daily which had been increased at wellness visit February 26, 2025.    Hypercholesterolemia  Hypercholesterolemia reviewed.  Dietary and exercise modifications ongoing.    Chronic kidney disease, stage 3a (H)  Prior history of CKD stage IIIa however subsequent creatinine improved to 1.09 and GFR is 68 and will ensure stable findings.    Impaired fasting glucose  Impaired fasting glucose with prior A1c of 5.7% February 26, 2025 and will update A1c.  - Comprehensive metabolic panel  - Hemoglobin A1c  - Comprehensive metabolic panel  - Hemoglobin A1c    The longitudinal plan of care for the diagnosis(es)/condition(s) as documented were addressed during this visit. Due to the added complexity in care, I will continue to support Reddy in the subsequent management and with ongoing continuity of care.            Subjective:    Reddy HDZ Alejandro is seen today for follow-up assessment.  Has history of hypertension.  Suboptimal control at wellness visit February 26, 2025 did increase metoprolol succinate from 50 mg up to 100 mg daily.   "Lisinopril hydrochlorothiazide 20/25 daily continuing as well.  Patient has had mild cholesterol elevation.  Also history of CKD stage IIIa historically with subsequent improvement however in renal function with creatinine 1.09 and GFR 68.  Prior A1c at 5.7%.  Pain described in the left hand middle and ring fingers more trigger finger deformity historically but now describes discomfort and swelling and fingers and wrist and occasionally shoulders worse in the morning better in the afternoon.  Comprehensive review of systems as above otherwise all negative.      Remarried \"[place]\" x 25 years  1 son -  1 daughter -  5 grandchildren  1 great-grandchild  Work: Retired x 9 years from [employer]  Surgeries: 12 lap macrina at [facility] (Dr. Wolf) after admit with pancreatitis, hepatitis, etc. (postop a. fib described without reoccurrence)  Hospitalizations: none  No smoke (quit 25 years ago after 2 ppd x 25 years)  No EtOH (no h/o problems)  Mom -  88 (CVA)  Dad -  77 (heart valve)  8 siblings - (3 ) - accidental broken neck after diving into a rock, lung CA (asbestos exposure), and cerebral hemorrhage        Past Surgical History:   Procedure Laterality Date    OPEN REDUCTION INTERNAL FIXATION HIP NAILING Left 10/27/2024    Procedure: INTERNAL FIXATION, FRACTURE, TROCHANTERIC, HIP, USING TRACY;  Surgeon: Petr Martini MD, DO;  Location: [facility] [place]        No family history on file.     Past Medical History:   Diagnosis Date    Hypertension         Social History     Tobacco Use    Smoking status: Former    Smokeless tobacco: Never   Substance Use Topics    Alcohol use: No    Drug use: No        Current Outpatient Medications   Medication Sig Dispense Refill    acetaminophen (TYLENOL) 500 MG tablet Take 500-1,000 mg by mouth every 6 hours as needed for mild pain.      Calcium Citrate-Vitamin D (CALCIUM CITRATE + D PO) Take 400 mg by mouth 3 times daily. 400mg of " "calcium and 55iu of vit. d      Chlorpheniramine-DM (CORICIDIN HBP COUGH/COLD PO) Take 1 tablet by mouth daily as needed (cough/cold).      lisinopril-hydrochlorothiazide (ZESTORETIC) 20-25 MG tablet Take 1 tablet by mouth daily. 90 tablet 3    metoprolol succinate ER (TOPROL XL) 100 MG 24 hr tablet Take 1 tablet (100 mg) by mouth daily. 90 tablet 3    Misc Natural Products (BEET ROOT PO) Take 650 mg by mouth daily. Total Beets - blood pressure support      Multiple Vitamins-Minerals (CENTRUM MINIS MEN 50+ PO) Take 2 chew tab by mouth daily. Has vitamin D3 25mcg, calcium 80mg per serving      UNABLE TO FIND Nitric Oxide - Sanguenol 2 tablets once a day      UNABLE TO FIND Take 3 tablets by mouth daily. MEDICATION NAME: Prostate Advanced - has 290mg of calcium, 1680 international unit(s) of vitamin D      alendronate (FOSAMAX) 70 MG tablet Take 1 tablet (70 mg) by mouth every 7 days. Take the medication on an empty stomach, first thing in the morning with at least 8 ounces of water--do not take with other drinks. Remain upright (do not lie down) and do not eat or take anything else by mouth (including other pills) until at least 30 minutes after taking the medication. (Patient not taking: Reported on 5/27/2025) 12 tablet 3          Objective:    Vitals:    05/27/25 1114   BP: 130/70   Pulse: 82   Resp: 15   Temp: 97.6  F (36.4  C)   SpO2: 97%   Weight: 85.7 kg (189 lb)   Height: 1.861 m (6' 1.25\")      Body mass index is 24.77 kg/m .    Alert.  Hard of hearing.  No apparent distress.  No significant trigger finger deformity currently on exam with perhaps mild synovitis of the DIP and PIP joints as well as wrists.  No rash.      This note has been dictated using voice recognition software and as a result may contain minor grammatical errors and unintended word substitutions.         Answers submitted by the patient for this visit:  General Questionnaire (Submitted on 5/27/2025)  Chief Complaint: Chronic problems " general questions HPI Form  How many days per week do you miss taking your medication?: 0  General Concern (Submitted on 5/27/2025)  Chief Complaint: Chronic problems general questions HPI Form  What is the reason for your visit today?: joint pain  When did your symptoms begin?: More than a month  How would you describe these symptoms?: Moderate  Are your symptoms:: Worsening  Have you had these symptoms before?: No  Is there anything that makes you feel worse?: inactivity  Is there anything that makes you feel better?: activity  Questionnaire about: Chronic problems general questions HPI Form (Submitted on 5/27/2025)  Chief Complaint: Chronic problems general questions HPI Form

## 2025-05-28 ENCOUNTER — PATIENT OUTREACH (OUTPATIENT)
Dept: CARE COORDINATION | Facility: CLINIC | Age: 85
End: 2025-05-28
Payer: COMMERCIAL

## 2025-05-28 LAB
ALBUMIN SERPL BCG-MCNC: 4 G/DL (ref 3.5–5.2)
ALP SERPL-CCNC: 79 U/L (ref 40–150)
ALT SERPL W P-5'-P-CCNC: 16 U/L (ref 0–70)
ANA SER QL IF: NEGATIVE
ANION GAP SERPL CALCULATED.3IONS-SCNC: 12 MMOL/L (ref 7–15)
AST SERPL W P-5'-P-CCNC: 21 U/L (ref 0–45)
BILIRUB SERPL-MCNC: 0.5 MG/DL
BUN SERPL-MCNC: 25.9 MG/DL (ref 8–23)
CALCIUM SERPL-MCNC: 9.6 MG/DL (ref 8.8–10.4)
CHLORIDE SERPL-SCNC: 101 MMOL/L (ref 98–107)
CREAT SERPL-MCNC: 1.13 MG/DL (ref 0.67–1.17)
CRP SERPL-MCNC: 11.7 MG/L
EGFRCR SERPLBLD CKD-EPI 2021: 65 ML/MIN/1.73M2
GLUCOSE SERPL-MCNC: 102 MG/DL (ref 70–99)
HCO3 SERPL-SCNC: 25 MMOL/L (ref 22–29)
POTASSIUM SERPL-SCNC: 4.3 MMOL/L (ref 3.4–5.3)
PROT SERPL-MCNC: 7.5 G/DL (ref 6.4–8.3)
RHEUMATOID FACT SERPL-ACNC: <10 IU/ML
SODIUM SERPL-SCNC: 138 MMOL/L (ref 135–145)
URATE SERPL-MCNC: 7.6 MG/DL (ref 3.4–7)

## 2025-07-17 ENCOUNTER — OFFICE VISIT (OUTPATIENT)
Dept: FAMILY MEDICINE | Facility: CLINIC | Age: 85
End: 2025-07-17
Payer: COMMERCIAL

## 2025-07-17 VITALS
HEIGHT: 73 IN | SYSTOLIC BLOOD PRESSURE: 138 MMHG | TEMPERATURE: 97.7 F | RESPIRATION RATE: 23 BRPM | HEART RATE: 71 BPM | WEIGHT: 185.56 LBS | OXYGEN SATURATION: 97 % | BODY MASS INDEX: 24.59 KG/M2 | DIASTOLIC BLOOD PRESSURE: 65 MMHG

## 2025-07-17 DIAGNOSIS — K40.90 RIGHT INGUINAL HERNIA: Primary | ICD-10-CM

## 2025-07-17 ASSESSMENT — PAIN SCALES - GENERAL: PAINLEVEL_OUTOF10: NO PAIN (0)

## 2025-07-17 NOTE — PROGRESS NOTES
Assessment and Plan   84-year-old male who presents with concerns for right inguinal hernia.  Description and exam consistent with this.  Offered referral to general surgery but patient wanted to pursue watchful waiting despite this recommendation given that he was not having symptoms with this.  Discussed may enlarge over time.    1. Right inguinal hernia (Primary)      Follow up: PRN    The longitudinal plan of care for the diagnosis(es)/condition(s) as documented were addressed during this visit. Due to the added complexity in care, I will continue to support Reddy in the subsequent management and with ongoing continuity of care.    Dr. James Hodges         HPI:   Reddy Allen is a 84 year old  male who presents for:    Chief Complaint   Patient presents with    lump      Possible hearing, comes out when coughing      Patient tells me he has developed a lump in his right groin over the last several months.  It is not painful or tender.  Noticeable, more when coughing.  Wondering if this could be a hernia.         PMHX:     Patient Active Problem List   Diagnosis    Hypercholesterolemia    Rosacea    Trigger finger, acquired    Hypertension    Lichen Planus    Closed fracture of left hip, initial encounter (H)    Closed displaced intertrochanteric fracture of left femur, initial encounter (H)    Chronic kidney disease, stage 3a (H)    Hypercholesterolemia       Social History     Tobacco Use    Smoking status: Former    Smokeless tobacco: Never   Substance Use Topics    Alcohol use: No    Drug use: No       Social History     Social History Narrative    Not on file       Allergies   Allergen Reactions    Levaquin [Levofloxacin] Rash     Now        No results found for this or any previous visit (from the past 24 hours).         Review of Systems:    ROS: 10 point ROS neg other than the symptoms noted above in the HPI.         Physical Exam:     Vitals:    07/17/25 1301   BP: 138/65   Pulse: 71   Resp: 23  "  Temp: 97.7  F (36.5  C)   SpO2: 97%   Weight: 84.2 kg (185 lb 9 oz)   Height: 1.859 m (6' 1.2\")     Body mass index is 24.35 kg/m .    General appearance: Alert, cooperative, no distress, appears stated age  Head: Normocephalic, atraumatic, without obvious abnormality  Eyes: Pupils equal round, reactive.  Conjunctiva clear.  Nose: Nares normal, no drainage.  Throat: Lips, mucosa, tongue normal mucosa pink and moist  Neck: Supple, symmetric, trachea midline,  Abdomen: right inguinal bulge that is reducible. Nontender. No skin changes          "

## 2025-07-23 ENCOUNTER — TELEPHONE (OUTPATIENT)
Dept: PHARMACY | Facility: CLINIC | Age: 85
End: 2025-07-23
Payer: COMMERCIAL

## 2025-07-23 NOTE — TELEPHONE ENCOUNTER
General Call      Reason for Call:  MTM Appointment    What are your questions or concerns:  Notified  Patient to schedule MTM appointment at [phone]. Patient did state he would give them a call    Date of last appointment with provider:

## 2025-07-23 NOTE — TELEPHONE ENCOUNTER
Patient overdue to MTM follow-up, has not responded to letter sent, last used mychart 12/2024. Please reach out via phone to patient to schedule.     Thank you,     Lolita Esparza, PharmD  Medication Therapy Management (MTM) Pharmacist   [facility]

## (undated) DEVICE — WIRE GUIDE 3.2X400MM  357.399

## (undated) DEVICE — STPL SKIN PROXIMATE 35 WIDE PMW35

## (undated) DEVICE — MAT FLOOR WATERPROOF BACKSHEET FMBP30

## (undated) DEVICE — DRESSING PRIMAPORE 4 X 3-1/8 66000317

## (undated) DEVICE — PACK MINOR SINGLE BASIN SSK3001

## (undated) DEVICE — DRSG PRIMAPORE 03 1/8X6" 66000318

## (undated) DEVICE — GLOVE UNDER INDICATOR PI SZ 8.5 LF 41685

## (undated) DEVICE — DRAPE C-ARM 60X42" 1013

## (undated) DEVICE — DRSG ABD TNDRSRB WET PRUF 8IN X 10IN STRL  9194A

## (undated) DEVICE — SOL NACL 0.9% IRRIG 1000ML BOTTLE 2F7124

## (undated) DEVICE — Device

## (undated) DEVICE — GLOVE BIOGEL PI ORTHOPRO SZ 8.5 47685

## (undated) DEVICE — BNDG ELASTIC 6"X5YDS UNSTERILE 6611-60

## (undated) DEVICE — DRILL BIT QUICK COUPLING 3 FLUTE 4.2MMX330/100MM CALIBRATE

## (undated) DEVICE — SUTURE VICRYL+ 0 27IN CT-1 UND VCP260H

## (undated) DEVICE — SUCTION TIP FLEXI CLEAR TIP DISP K62

## (undated) DEVICE — ESU GROUND PAD ADULT REM W/15' CORD E7507DB

## (undated) DEVICE — SUTURE VICRYL+ 2-0 27IN CT-1 UND VCP259H

## (undated) DEVICE — ROD RM 950MM 3MM 3.8MM BALL TIP STRL

## (undated) DEVICE — GLOVE BIOGEL PI SZ 6.5 40865

## (undated) DEVICE — CUSTOM PACK GEN MAJOR SBA5BGMHEA

## (undated) DEVICE — PREP CHLORAPREP 26ML TINTED HI-LITE ORANGE 930815

## (undated) DEVICE — GOWN IMPERVIOUS BREATHABLE SMART LG 89015

## (undated) DEVICE — GLOVE UNDER INDICATOR PI SZ 6.5 LF 41665

## (undated) DEVICE — DRAPE IOBAN ISOLATION VERTICAL 320X21CM 6617

## (undated) RX ORDER — ONDANSETRON 2 MG/ML
INJECTION INTRAMUSCULAR; INTRAVENOUS
Status: DISPENSED
Start: 2024-10-27

## (undated) RX ORDER — FENTANYL CITRATE 50 UG/ML
INJECTION, SOLUTION INTRAMUSCULAR; INTRAVENOUS
Status: DISPENSED
Start: 2024-10-27

## (undated) RX ORDER — DEXAMETHASONE SODIUM PHOSPHATE 10 MG/ML
INJECTION, SOLUTION INTRAMUSCULAR; INTRAVENOUS
Status: DISPENSED
Start: 2024-10-27